# Patient Record
Sex: MALE | RURAL
[De-identification: names, ages, dates, MRNs, and addresses within clinical notes are randomized per-mention and may not be internally consistent; named-entity substitution may affect disease eponyms.]

---

## 2020-09-01 ENCOUNTER — HISTORICAL (OUTPATIENT)
Dept: ADMINISTRATIVE | Facility: HOSPITAL | Age: 80
End: 2020-09-01

## 2020-09-01 LAB
ANION GAP SERPL CALCULATED.3IONS-SCNC: 13 MMOL/L
BUN SERPL-MCNC: 22 MG/DL (ref 7–18)
CALCIUM SERPL-MCNC: 8.8 MG/DL (ref 8.5–10.1)
CHLORIDE SERPL-SCNC: 97 MMOL/L (ref 101–111)
CO2 SERPL-SCNC: 23 MMOL/L (ref 21–32)
CREAT SERPL-MCNC: 1.4 MG/DL (ref 0.6–1.3)
GLUCOSE SERPL-MCNC: 81 MG/DL (ref 74–106)
POTASSIUM SERPL-SCNC: 5.2 MMOL/L (ref 3.6–5)
SODIUM SERPL-SCNC: 128 MMOL/L (ref 135–145)

## 2023-01-25 ENCOUNTER — LAB REQUISITION (OUTPATIENT)
Dept: LAB | Facility: HOSPITAL | Age: 83
End: 2023-01-25
Attending: INTERNAL MEDICINE
Payer: MEDICARE

## 2023-01-25 DIAGNOSIS — Z79.82 LONG TERM (CURRENT) USE OF ASPIRIN: ICD-10-CM

## 2023-01-25 DIAGNOSIS — I10 ESSENTIAL (PRIMARY) HYPERTENSION: ICD-10-CM

## 2023-01-25 DIAGNOSIS — I73.9 PERIPHERAL VASCULAR DISEASE, UNSPECIFIED: ICD-10-CM

## 2023-01-25 DIAGNOSIS — E78.5 HYPERLIPIDEMIA, UNSPECIFIED: ICD-10-CM

## 2023-01-25 LAB
ALBUMIN SERPL BCP-MCNC: 2.8 G/DL (ref 3.5–5)
ALBUMIN/GLOB SERPL: 0.8 {RATIO}
ALP SERPL-CCNC: 120 U/L (ref 45–115)
ALT SERPL W P-5'-P-CCNC: 14 U/L (ref 16–61)
ANION GAP SERPL CALCULATED.3IONS-SCNC: 11 MMOL/L (ref 7–16)
AST SERPL W P-5'-P-CCNC: 15 U/L (ref 15–37)
BASOPHILS # BLD AUTO: 0 K/UL (ref 0–0.2)
BASOPHILS NFR BLD AUTO: 0 % (ref 0–1)
BILIRUB SERPL-MCNC: 0.4 MG/DL (ref ?–1.2)
BUN SERPL-MCNC: 19 MG/DL (ref 7–18)
BUN/CREAT SERPL: 12 (ref 6–20)
CALCIUM SERPL-MCNC: 9.4 MG/DL (ref 8.5–10.1)
CHLORIDE SERPL-SCNC: 106 MMOL/L (ref 98–107)
CHOLEST SERPL-MCNC: 124 MG/DL (ref 0–200)
CHOLEST/HDLC SERPL: 3 {RATIO}
CO2 SERPL-SCNC: 29 MMOL/L (ref 21–32)
CREAT SERPL-MCNC: 1.59 MG/DL (ref 0.7–1.3)
DIFFERENTIAL METHOD BLD: ABNORMAL
EGFR (NO RACE VARIABLE) (RUSH/TITUS): 44 ML/MIN/1.73M²
EOSINOPHIL # BLD AUTO: 0.15 K/UL (ref 0–0.5)
EOSINOPHIL NFR BLD AUTO: 1.6 % (ref 1–4)
ERYTHROCYTE [DISTWIDTH] IN BLOOD BY AUTOMATED COUNT: 13.4 % (ref 11.5–14.5)
GLOBULIN SER-MCNC: 3.4 G/DL (ref 2–4)
GLUCOSE SERPL-MCNC: 93 MG/DL (ref 74–106)
HCT VFR BLD AUTO: 41.2 % (ref 40–54)
HDLC SERPL-MCNC: 41 MG/DL (ref 40–60)
HGB BLD-MCNC: 13.4 G/DL (ref 13.5–18)
LDLC SERPL CALC-MCNC: 70 MG/DL
LDLC/HDLC SERPL: 1.7 {RATIO}
LYMPHOCYTES # BLD AUTO: 1.76 K/UL (ref 1–4.8)
LYMPHOCYTES NFR BLD AUTO: 18.3 % (ref 27–41)
MCH RBC QN AUTO: 28.2 PG (ref 27–31)
MCHC RBC AUTO-ENTMCNC: 32.5 G/DL (ref 32–36)
MCV RBC AUTO: 86.7 FL (ref 80–96)
MONOCYTES # BLD AUTO: 0.57 K/UL (ref 0–0.8)
MONOCYTES NFR BLD AUTO: 5.9 % (ref 2–6)
MPC BLD CALC-MCNC: 11.9 FL (ref 9.4–12.4)
NEUTROPHILS # BLD AUTO: 7.16 K/UL (ref 1.8–7.7)
NEUTROPHILS NFR BLD AUTO: 74.2 % (ref 53–65)
NONHDLC SERPL-MCNC: 83 MG/DL
NT-PROBNP SERPL-MCNC: 1557 PG/ML (ref 1–450)
PLATELET # BLD AUTO: 148 K/UL (ref 150–400)
POTASSIUM SERPL-SCNC: 4.1 MMOL/L (ref 3.5–5.1)
PROT SERPL-MCNC: 6.2 G/DL (ref 6.4–8.2)
RBC # BLD AUTO: 4.75 M/UL (ref 4.6–6.2)
SODIUM SERPL-SCNC: 142 MMOL/L (ref 136–145)
TRIGL SERPL-MCNC: 64 MG/DL (ref 35–150)
TSH SERPL DL<=0.005 MIU/L-ACNC: 0.77 UIU/ML (ref 0.36–3.74)
VLDLC SERPL-MCNC: 13 MG/DL
WBC # BLD AUTO: 9.64 K/UL (ref 4.5–11)

## 2023-01-25 PROCEDURE — 80053 COMPREHEN METABOLIC PANEL: CPT | Performed by: INTERNAL MEDICINE

## 2023-01-25 PROCEDURE — 83880 ASSAY OF NATRIURETIC PEPTIDE: CPT | Performed by: INTERNAL MEDICINE

## 2023-01-25 PROCEDURE — 80061 LIPID PANEL: CPT | Performed by: INTERNAL MEDICINE

## 2023-01-25 PROCEDURE — 85025 COMPLETE CBC W/AUTO DIFF WBC: CPT | Performed by: INTERNAL MEDICINE

## 2023-01-25 PROCEDURE — 84443 ASSAY THYROID STIM HORMONE: CPT | Performed by: INTERNAL MEDICINE

## 2024-04-30 ENCOUNTER — HOSPITAL ENCOUNTER (INPATIENT)
Facility: HOSPITAL | Age: 84
LOS: 3 days | Discharge: NURSING, REHAB OR LONG-TERM CARE | DRG: 641 | End: 2024-05-03
Attending: EMERGENCY MEDICINE | Admitting: HOSPITALIST
Payer: MEDICARE

## 2024-04-30 DIAGNOSIS — E86.0 DEHYDRATION: ICD-10-CM

## 2024-04-30 DIAGNOSIS — E87.0 DEHYDRATION WITH HYPERNATREMIA: ICD-10-CM

## 2024-04-30 DIAGNOSIS — N17.9 AKI (ACUTE KIDNEY INJURY): Primary | ICD-10-CM

## 2024-04-30 DIAGNOSIS — E86.0 DEHYDRATION WITH HYPERNATREMIA: ICD-10-CM

## 2024-04-30 DIAGNOSIS — R53.1 GENERAL WEAKNESS: ICD-10-CM

## 2024-04-30 DIAGNOSIS — N39.0 URINARY TRACT INFECTION WITHOUT HEMATURIA, SITE UNSPECIFIED: ICD-10-CM

## 2024-04-30 DIAGNOSIS — E87.0 HYPERNATREMIA: ICD-10-CM

## 2024-04-30 DIAGNOSIS — R07.9 CHEST PAIN: ICD-10-CM

## 2024-04-30 PROBLEM — I10 SYSTOLIC HYPERTENSION: Status: ACTIVE | Noted: 2024-04-30

## 2024-04-30 PROBLEM — F01.B0 MODERATE VASCULAR DEMENTIA WITHOUT BEHAVIORAL DISTURBANCE, PSYCHOTIC DISTURBANCE, MOOD DISTURBANCE, OR ANXIETY: Status: ACTIVE | Noted: 2024-04-30

## 2024-04-30 PROBLEM — R62.7 FAILURE TO THRIVE IN ADULT: Status: ACTIVE | Noted: 2024-04-30

## 2024-04-30 PROBLEM — G40.909 SEIZURE DISORDER: Status: ACTIVE | Noted: 2024-04-30

## 2024-04-30 PROBLEM — Z85.46 HISTORY OF PROSTATE CANCER: Status: ACTIVE | Noted: 2024-04-30

## 2024-04-30 PROBLEM — Z86.73 HISTORY OF MULTIPLE STROKES: Status: ACTIVE | Noted: 2024-04-30

## 2024-04-30 PROBLEM — R79.89 PRERENAL AZOTEMIA: Status: ACTIVE | Noted: 2024-04-30

## 2024-04-30 PROBLEM — K56.41 FECAL IMPACTION IN RECTUM: Status: ACTIVE | Noted: 2024-04-30

## 2024-04-30 LAB
ALBUMIN SERPL BCP-MCNC: 1.9 G/DL (ref 3.5–5)
ALBUMIN/GLOB SERPL: 0.4 {RATIO}
ALP SERPL-CCNC: 100 U/L (ref 45–115)
ALT SERPL W P-5'-P-CCNC: 88 U/L (ref 16–61)
ANION GAP SERPL CALCULATED.3IONS-SCNC: 13 MMOL/L (ref 7–16)
AST SERPL W P-5'-P-CCNC: 66 U/L (ref 15–37)
BACTERIA #/AREA URNS HPF: ABNORMAL /HPF
BASOPHILS # BLD AUTO: 0.02 K/UL (ref 0–0.2)
BASOPHILS NFR BLD AUTO: 0.1 % (ref 0–1)
BILIRUB SERPL-MCNC: 0.3 MG/DL (ref ?–1.2)
BILIRUB UR QL STRIP: NEGATIVE
BUN SERPL-MCNC: 67 MG/DL (ref 7–18)
BUN/CREAT SERPL: 25 (ref 6–20)
CALCIUM SERPL-MCNC: 8.6 MG/DL (ref 8.5–10.1)
CHLORIDE SERPL-SCNC: 118 MMOL/L (ref 98–107)
CLARITY UR: ABNORMAL
CO2 SERPL-SCNC: 27 MMOL/L (ref 21–32)
COLOR UR: YELLOW
CREAT SERPL-MCNC: 2.73 MG/DL (ref 0.7–1.3)
DIFFERENTIAL METHOD BLD: ABNORMAL
EGFR (NO RACE VARIABLE) (RUSH/TITUS): 23 ML/MIN/1.73M2
EOSINOPHIL # BLD AUTO: 0.09 K/UL (ref 0–0.5)
EOSINOPHIL NFR BLD AUTO: 0.5 % (ref 1–4)
ERYTHROCYTE [DISTWIDTH] IN BLOOD BY AUTOMATED COUNT: 14.8 % (ref 11.5–14.5)
FINE GRAN CASTS #/AREA URNS LPF: ABNORMAL /LPF
GLOBULIN SER-MCNC: 5.2 G/DL (ref 2–4)
GLUCOSE SERPL-MCNC: 95 MG/DL (ref 74–106)
GLUCOSE UR STRIP-MCNC: NORMAL MG/DL
HCT VFR BLD AUTO: 38.7 % (ref 40–54)
HGB BLD-MCNC: 11.4 G/DL (ref 13.5–18)
HYALINE CASTS #/AREA URNS LPF: ABNORMAL /LPF
IMM GRANULOCYTES # BLD AUTO: 0.16 K/UL (ref 0–0.04)
IMM GRANULOCYTES NFR BLD: 0.9 % (ref 0–0.4)
KETONES UR STRIP-SCNC: NEGATIVE MG/DL
LEUKOCYTE ESTERASE UR QL STRIP: NEGATIVE
LYMPHOCYTES # BLD AUTO: 1.91 K/UL (ref 1–4.8)
LYMPHOCYTES NFR BLD AUTO: 10.5 % (ref 27–41)
MCH RBC QN AUTO: 27.4 PG (ref 27–31)
MCHC RBC AUTO-ENTMCNC: 29.5 G/DL (ref 32–36)
MCV RBC AUTO: 93 FL (ref 80–96)
MONOCYTES # BLD AUTO: 0.96 K/UL (ref 0–0.8)
MONOCYTES NFR BLD AUTO: 5.3 % (ref 2–6)
MPC BLD CALC-MCNC: 12.6 FL (ref 9.4–12.4)
MUCOUS, UA: ABNORMAL /LPF
NEUTROPHILS # BLD AUTO: 15.03 K/UL (ref 1.8–7.7)
NEUTROPHILS NFR BLD AUTO: 82.7 % (ref 53–65)
NITRITE UR QL STRIP: NEGATIVE
NRBC # BLD AUTO: 0 X10E3/UL
NRBC, AUTO (.00): 0 %
NT-PROBNP SERPL-MCNC: 7424 PG/ML (ref 1–450)
PH UR STRIP: 5.5 PH UNITS
PLATELET # BLD AUTO: 206 K/UL (ref 150–400)
POTASSIUM SERPL-SCNC: 3.6 MMOL/L (ref 3.5–5.1)
PROT SERPL-MCNC: 7.1 G/DL (ref 6.4–8.2)
PROT UR QL STRIP: 200
RBC # BLD AUTO: 4.16 M/UL (ref 4.6–6.2)
RBC # UR STRIP: NEGATIVE /UL
RBC #/AREA URNS HPF: 2 /HPF
SODIUM SERPL-SCNC: 154 MMOL/L (ref 136–145)
SP GR UR STRIP: 1.02
UROBILINOGEN UR STRIP-ACNC: NORMAL MG/DL
WBC # BLD AUTO: 18.17 K/UL (ref 4.5–11)
WBC #/AREA URNS HPF: 10 /HPF

## 2024-04-30 PROCEDURE — 25000003 PHARM REV CODE 250: Performed by: EMERGENCY MEDICINE

## 2024-04-30 PROCEDURE — 99285 EMERGENCY DEPT VISIT HI MDM: CPT | Mod: 25

## 2024-04-30 PROCEDURE — 99223 1ST HOSP IP/OBS HIGH 75: CPT | Mod: AI,,, | Performed by: HOSPITALIST

## 2024-04-30 PROCEDURE — 85025 COMPLETE CBC W/AUTO DIFF WBC: CPT | Performed by: EMERGENCY MEDICINE

## 2024-04-30 PROCEDURE — 63600175 PHARM REV CODE 636 W HCPCS: Performed by: HOSPITALIST

## 2024-04-30 PROCEDURE — 81001 URINALYSIS AUTO W/SCOPE: CPT | Performed by: EMERGENCY MEDICINE

## 2024-04-30 PROCEDURE — 99285 EMERGENCY DEPT VISIT HI MDM: CPT | Mod: ,,, | Performed by: EMERGENCY MEDICINE

## 2024-04-30 PROCEDURE — 36415 COLL VENOUS BLD VENIPUNCTURE: CPT | Performed by: EMERGENCY MEDICINE

## 2024-04-30 PROCEDURE — 83880 ASSAY OF NATRIURETIC PEPTIDE: CPT | Performed by: EMERGENCY MEDICINE

## 2024-04-30 PROCEDURE — 11000001 HC ACUTE MED/SURG PRIVATE ROOM

## 2024-04-30 PROCEDURE — 63600175 PHARM REV CODE 636 W HCPCS: Performed by: EMERGENCY MEDICINE

## 2024-04-30 PROCEDURE — 25000003 PHARM REV CODE 250: Performed by: HOSPITALIST

## 2024-04-30 PROCEDURE — 80053 COMPREHEN METABOLIC PANEL: CPT | Performed by: EMERGENCY MEDICINE

## 2024-04-30 PROCEDURE — G0103 PSA SCREENING: HCPCS | Performed by: HOSPITALIST

## 2024-04-30 RX ORDER — LEVETIRACETAM 500 MG/1
500 TABLET ORAL 2 TIMES DAILY
Status: DISCONTINUED | OUTPATIENT
Start: 2024-04-30 | End: 2024-05-03 | Stop reason: HOSPADM

## 2024-04-30 RX ORDER — LORAZEPAM 2 MG/ML
1 INJECTION INTRAMUSCULAR
Status: DISCONTINUED | OUTPATIENT
Start: 2024-04-30 | End: 2024-05-03 | Stop reason: HOSPADM

## 2024-04-30 RX ORDER — PRAVASTATIN SODIUM 40 MG/1
80 TABLET ORAL NIGHTLY
Status: DISCONTINUED | OUTPATIENT
Start: 2024-04-30 | End: 2024-05-03 | Stop reason: HOSPADM

## 2024-04-30 RX ORDER — CLOPIDOGREL BISULFATE 75 MG/1
75 TABLET ORAL DAILY
Status: DISCONTINUED | OUTPATIENT
Start: 2024-05-01 | End: 2024-05-03 | Stop reason: HOSPADM

## 2024-04-30 RX ORDER — PRAVASTATIN SODIUM 80 MG/1
80 TABLET ORAL NIGHTLY
Status: ON HOLD | COMMUNITY
Start: 2024-04-07 | End: 2024-06-05 | Stop reason: HOSPADM

## 2024-04-30 RX ORDER — PROMETHAZINE HYDROCHLORIDE 25 MG/1
25 TABLET ORAL EVERY 6 HOURS PRN
Status: DISCONTINUED | OUTPATIENT
Start: 2024-04-30 | End: 2024-05-03 | Stop reason: HOSPADM

## 2024-04-30 RX ORDER — ACETAMINOPHEN 500 MG
1000 TABLET ORAL EVERY 6 HOURS PRN
COMMUNITY

## 2024-04-30 RX ORDER — HEPARIN SODIUM 5000 [USP'U]/ML
5000 INJECTION, SOLUTION INTRAVENOUS; SUBCUTANEOUS EVERY 8 HOURS
Status: DISCONTINUED | OUTPATIENT
Start: 2024-04-30 | End: 2024-05-03 | Stop reason: HOSPADM

## 2024-04-30 RX ORDER — ACETAMINOPHEN 325 MG/1
650 TABLET ORAL EVERY 4 HOURS PRN
Status: DISCONTINUED | OUTPATIENT
Start: 2024-04-30 | End: 2024-05-03 | Stop reason: HOSPADM

## 2024-04-30 RX ORDER — SODIUM CHLORIDE 0.9 % (FLUSH) 0.9 %
10 SYRINGE (ML) INJECTION EVERY 12 HOURS PRN
Status: DISCONTINUED | OUTPATIENT
Start: 2024-04-30 | End: 2024-05-03 | Stop reason: HOSPADM

## 2024-04-30 RX ORDER — PANTOPRAZOLE SODIUM 40 MG/1
40 FOR SUSPENSION ORAL DAILY
Status: DISCONTINUED | OUTPATIENT
Start: 2024-05-01 | End: 2024-05-03 | Stop reason: HOSPADM

## 2024-04-30 RX ORDER — DEXTROSE MONOHYDRATE AND SODIUM CHLORIDE 5; .225 G/100ML; G/100ML
100 INJECTION, SOLUTION INTRAVENOUS CONTINUOUS
Status: DISCONTINUED | OUTPATIENT
Start: 2024-04-30 | End: 2024-05-02

## 2024-04-30 RX ORDER — NALOXONE HCL 0.4 MG/ML
0.02 VIAL (ML) INJECTION
Status: DISCONTINUED | OUTPATIENT
Start: 2024-04-30 | End: 2024-05-03 | Stop reason: HOSPADM

## 2024-04-30 RX ORDER — LEVETIRACETAM 500 MG/1
500 TABLET ORAL 2 TIMES DAILY
COMMUNITY
Start: 2024-04-01

## 2024-04-30 RX ORDER — MIRTAZAPINE 15 MG/1
15 TABLET, FILM COATED ORAL NIGHTLY
Status: DISCONTINUED | OUTPATIENT
Start: 2024-04-30 | End: 2024-05-03 | Stop reason: HOSPADM

## 2024-04-30 RX ORDER — PANTOPRAZOLE SODIUM 40 MG/1
40 FOR SUSPENSION ORAL DAILY
COMMUNITY
Start: 2024-04-02

## 2024-04-30 RX ORDER — LATANOPROST 50 UG/ML
1 SOLUTION/ DROPS OPHTHALMIC NIGHTLY
Status: DISCONTINUED | OUTPATIENT
Start: 2024-04-30 | End: 2024-05-03 | Stop reason: HOSPADM

## 2024-04-30 RX ORDER — ONDANSETRON HYDROCHLORIDE 2 MG/ML
4 INJECTION, SOLUTION INTRAVENOUS EVERY 8 HOURS PRN
Status: DISCONTINUED | OUTPATIENT
Start: 2024-04-30 | End: 2024-05-03 | Stop reason: HOSPADM

## 2024-04-30 RX ORDER — LATANOPROST 50 UG/ML
1 SOLUTION/ DROPS OPHTHALMIC NIGHTLY
COMMUNITY
Start: 2024-03-01

## 2024-04-30 RX ORDER — ASPIRIN 81 MG/1
81 TABLET ORAL DAILY
Status: DISCONTINUED | OUTPATIENT
Start: 2024-05-01 | End: 2024-05-03 | Stop reason: HOSPADM

## 2024-04-30 RX ORDER — ASPIRIN 81 MG/1
1 TABLET ORAL DAILY
Status: ON HOLD | COMMUNITY
End: 2024-06-05 | Stop reason: HOSPADM

## 2024-04-30 RX ORDER — CLOPIDOGREL BISULFATE 75 MG/1
75 TABLET ORAL DAILY
COMMUNITY
Start: 2024-04-15

## 2024-04-30 RX ORDER — MIRTAZAPINE 15 MG/1
15 TABLET, FILM COATED ORAL NIGHTLY
COMMUNITY
Start: 2024-04-18

## 2024-04-30 RX ADMIN — LEVETIRACETAM 500 MG: 500 TABLET, FILM COATED ORAL at 09:04

## 2024-04-30 RX ADMIN — DEXTROSE AND SODIUM CHLORIDE 100 ML/HR: 5; 200 INJECTION, SOLUTION INTRAVENOUS at 06:04

## 2024-04-30 RX ADMIN — LATANOPROST 1 DROP: 50 SOLUTION OPHTHALMIC at 09:04

## 2024-04-30 RX ADMIN — MIRTAZAPINE 15 MG: 15 TABLET, FILM COATED ORAL at 09:04

## 2024-04-30 RX ADMIN — SODIUM CHLORIDE 500 ML: 9 INJECTION, SOLUTION INTRAVENOUS at 03:04

## 2024-04-30 RX ADMIN — CEFTRIAXONE SODIUM 1 G: 1 INJECTION, POWDER, FOR SOLUTION INTRAMUSCULAR; INTRAVENOUS at 03:04

## 2024-04-30 RX ADMIN — PRAVASTATIN SODIUM 80 MG: 40 TABLET ORAL at 09:04

## 2024-04-30 RX ADMIN — HEPARIN SODIUM 5000 UNITS: 5000 INJECTION, SOLUTION INTRAVENOUS; SUBCUTANEOUS at 09:04

## 2024-04-30 NOTE — ED NOTES
Called [facility] Medical Records and they stated they would fax over information right now.

## 2024-04-30 NOTE — PLAN OF CARE
Problem: Adult Inpatient Plan of Care  Goal: Plan of Care Review  Outcome: Progressing  Goal: Patient-Specific Goal (Individualized)  Outcome: Progressing  Goal: Absence of Hospital-Acquired Illness or Injury  Outcome: Progressing  Goal: Optimal Comfort and Wellbeing  Outcome: Progressing  Goal: Readiness for Transition of Care  Outcome: Progressing     Problem: Wound  Goal: Optimal Coping  Outcome: Progressing  Goal: Optimal Functional Ability  Outcome: Progressing  Goal: Absence of Infection Signs and Symptoms  Outcome: Progressing  Goal: Improved Oral Intake  Outcome: Progressing  Goal: Optimal Pain Control and Function  Outcome: Progressing  Goal: Skin Health and Integrity  Outcome: Progressing  Goal: Optimal Wound Healing  Outcome: Progressing     Problem: Skin Injury Risk Increased  Goal: Skin Health and Integrity  Outcome: Progressing

## 2024-04-30 NOTE — ED TRIAGE NOTES
"""Recommend cataract extraction with intraocular lens (IOL) OS. Discussed the RBAs mentioned the loss part/all of vision with surgery answered patient questions and offered patient copy of consent form. """ Presents to ED via EMS from [facility] in [place], [place] for c/o abnormal labs. Patient had elevated BUN, creatinine and sodium level that was checked 4/29/24. Patient was on Lasix and had labs done last week that were elevated and was taken off the Lasix. Labs were rechecked yesterday and worsened.

## 2024-04-30 NOTE — ED PROVIDER NOTES
Encounter Date: 4/30/2024       History     Chief Complaint   Patient presents with    Abnormal Lab     Patient was transferred by EMS to Hospital from [facility] for evaluation of metabolic panel done on April 29th at 5:06 p.m..  Creatinine resulted as 2.5.  Sodium was 157.  Potassium was 5.1.  BUN was 29.  Bicarb was 24.  Calcium 8.7.  Patient had a stroke in 2008.  He has had right-sided hemiplegia chronically.  There has been no acute change according to EMS.  Patient does participate in the history but he was aphasic and can answer yes or no.  He does answer affirmatively to does he feel generally weak but answers no in regard to pain or shortness of breath or vomiting or new weakness        Review of patient's allergies indicates:  No Known Allergies  Past Medical History:   Diagnosis Date    Depression     Hemiplegia and hemiparesis following cerebral infarction affecting right dominant side     Hypertension     Seizures     Stroke     Vascular dementia      No past surgical history on file.  No family history on file.  Social History     Tobacco Use    Smoking status: Unknown   Substance Use Topics    Alcohol use: Not Currently    Drug use: Not Currently     Review of Systems   Constitutional:  Negative for fever.   HENT:  Negative for sore throat.    Respiratory:  Negative for shortness of breath.    Cardiovascular:  Negative for chest pain.   Gastrointestinal:  Negative for nausea.   Genitourinary:  Negative for dysuria.   Musculoskeletal:  Negative for back pain.   Skin:  Negative for rash.   Neurological:  Positive for weakness.   Hematological:  Does not bruise/bleed easily.       Physical Exam     Initial Vitals   BP Pulse Resp Temp SpO2   04/30/24 1008 04/30/24 1009 04/30/24 1009 04/30/24 1009 04/30/24 1009   110/84 83 14 98.3 °F (36.8 °C) 100 %      MAP       --                Physical Exam    Nursing note and vitals reviewed.  Constitutional: He appears well-developed and well-nourished.   HENT:    Head: Normocephalic and atraumatic.   Eyes: EOM are normal. Pupils are equal, round, and reactive to light.   Neck: Neck supple. No thyromegaly present. No JVD present.   Normal range of motion.  Cardiovascular:  Normal rate, regular rhythm, normal heart sounds and intact distal pulses.           No murmur heard.  Pulmonary/Chest: Breath sounds normal. No stridor. No respiratory distress. He has no wheezes.   Abdominal: Abdomen is soft. Bowel sounds are normal. He exhibits no distension. There is no abdominal tenderness.   Musculoskeletal:         General: Edema (mild edema bilateral lower extremities) present. No tenderness. Normal range of motion.      Cervical back: Normal range of motion and neck supple.     Lymphadenopathy:     He has no cervical adenopathy.   Neurological: He is alert and oriented to person, place, and time. He has normal strength. No cranial nerve deficit or sensory deficit. GCS score is 15. GCS eye subscore is 4. GCS verbal subscore is 5. GCS motor subscore is 6.   Right-sided hemiplegia.  Aphasia.   Skin: Skin is warm and dry. Capillary refill takes less than 2 seconds. No rash noted.   Psychiatric: He has a normal mood and affect.         Medical Screening Exam   See Full Note    ED Course   Procedures  Labs Reviewed   COMPREHENSIVE METABOLIC PANEL - Abnormal; Notable for the following components:       Result Value    Sodium 154 (*)     Chloride 118 (*)     BUN 67 (*)     Creatinine 2.73 (*)     BUN/Creatinine Ratio 25 (*)     Albumin 1.9 (*)     Globulin 5.2 (*)     ALT 88 (*)     AST 66 (*)     eGFR 23 (*)     All other components within normal limits   URINALYSIS, REFLEX TO URINE CULTURE - Abnormal; Notable for the following components:    Protein,  (*)     All other components within normal limits   NT-PRO NATRIURETIC PEPTIDE - Abnormal; Notable for the following components:    ProBNP 7,424 (*)     All other components within normal limits   CBC WITH DIFFERENTIAL - Abnormal;  Notable for the following components:    WBC 18.17 (*)     RBC 4.16 (*)     Hemoglobin 11.4 (*)     Hematocrit 38.7 (*)     MCHC 29.5 (*)     RDW 14.8 (*)     MPV 12.6 (*)     Neutrophils % 82.7 (*)     Lymphocytes % 10.5 (*)     Eosinophils % 0.5 (*)     Immature Granulocytes % 0.9 (*)     Neutrophils, Abs 15.03 (*)     Monocytes, Absolute 0.96 (*)     Immature Granulocytes, Absolute 0.16 (*)     All other components within normal limits   URINALYSIS, MICROSCOPIC - Abnormal; Notable for the following components:    WBC, UA 10 (*)     Bacteria, UA Occasional (*)     Hyaline Casts, UA 2-5 (*)     Fine Granular Casts, UA 5-10 (*)     Mucous Occasional (*)     All other components within normal limits   CBC W/ AUTO DIFFERENTIAL    Narrative:     The following orders were created for panel order CBC auto differential.  Procedure                               Abnormality         Status                     ---------                               -----------         ------                     CBC with Differential[[id number]]       Abnormal            Final result                 Please view results for these tests on the individual orders.          Imaging Results              CT Abdomen Pelvis  Without Contrast (Final result)  Result time 04/30/24 14:15:28      Final result by Tico Dela Cruz MD (04/30/24 14:15:28)                   Impression:      Large degree of stool within the rectum.  Mild diffuse more proximal colonic stool.  No bowel obstruction.    Cholelithiasis.  No evidence to suggest cholecystitis by this study.      Electronically signed by: Tico Dela Cruz  Date:    04/30/2024  Time:    14:15               Narrative:    EXAMINATION:  CT ABDOMEN PELVIS WITHOUT CONTRAST    CLINICAL HISTORY:  Abdominal abscess/infection suspected;Abdominal pain, acute, nonlocalized;abdominal pain;    TECHNIQUE:  Low dose axial images, sagittal and coronal reformations were obtained from the lung bases to the pubic symphysis.  Oral  contrast was not administered. The CT examination was performed using one or more of the following dose reduction techniques: Automated exposure control, adjustment of the mA and kV according to patient's size, use of acute or iterative reconstruction techniques.    COMPARISON:  None    FINDINGS:  Lung bases are clear.  There is four-chamber mild cardiomegaly.  Coronary and thoracic aortic calcifications.    No focal hepatic lesion detected.  Cholelithiasis seen.  No inflammatory changes of the gallbladder to suggest cholecystitis by this CT.  There is a cyst seen inferiorly of the left kidney.  No hydronephrosis of either kidney.  Pancreas not well imaged.  Spleen unremarkable.    No pneumoperitoneum.  No ascites.  Mild diffuse colonic stool with note made that within the rectum there is a large degree of stool present.  No bowel obstruction.    No adenopathy.  Aortic and iliac calcifications.  Urinary bladder unremarkable.    Musculoskeletal: Diffuse degenerative changes of the spine and hips.  No acute fracture identified.                                       X-Ray Chest 1 View (Final result)  Result time 04/30/24 13:35:35      Final result by Tico Dela Cruz MD (04/30/24 13:35:35)                   Impression:      No acute findings.      Electronically signed by: Tico Dela Cruz  Date:    04/30/2024  Time:    13:35               Narrative:    EXAMINATION:  XR CHEST 1 VIEW    CLINICAL HISTORY:  Weakness    TECHNIQUE:  Single frontal view of the chest was performed.    COMPARISON:  None    FINDINGS:  Heart size normal. Lungs clear. No pneumothorax or pleural effusion.                                       Medications   aspirin EC tablet 81 mg (has no administration in time range)   clopidogreL tablet 75 mg (has no administration in time range)   latanoprost 0.005 % ophthalmic solution 1 drop (1 drop Both Eyes Given 4/30/24 2634)   levETIRAcetam tablet 500 mg (500 mg Oral Given 4/30/24 2100)   mirtazapine tablet 15 mg  (15 mg Oral Given 4/30/24 2147)   pantoprazole suspension 40 mg (has no administration in time range)   pravastatin tablet 80 mg (80 mg Oral Given 4/30/24 2147)   sodium chloride 0.9% flush 10 mL (has no administration in time range)   naloxone 0.4 mg/mL injection 0.02 mg (has no administration in time range)   acetaminophen tablet 650 mg (has no administration in time range)   ondansetron injection 4 mg (has no administration in time range)   promethazine tablet 25 mg (has no administration in time range)   heparin (porcine) injection 5,000 Units (5,000 Units Subcutaneous Given 4/30/24 2147)   LORazepam injection 1 mg (has no administration in time range)   dextrose 5 % and 0.2 % NaCl infusion (100 mL/hr Intravenous New Bag 5/1/24 6742)   cefTRIAXone (Rocephin) 1 g in dextrose 5 % in water (D5W) 100 mL IVPB (MB+) (0 g Intravenous Stopped 4/30/24 1621)   sodium chloride 0.9% bolus 500 mL 500 mL (0 mLs Intravenous Stopped 4/30/24 1651)     Medical Decision Making  MDM    Patient presents for emergent evaluation of acute generalized weakness decreased p.o. intake abnormal labs that poses a threat to life and/or bodily function.    In the ED patient found to have acute -duration acute kidney injury hyponatremia.  UTI  I ordered labs and personally reviewed them.  Labs significant for creatinine elevated.  Sodium high.  Pyuria  I ordered X-rays and personally reviewed them and reviewed the radiologist interpretation.  Xray significant for chest x-ray no acute abnormality.    I ordered CT scan and personally reviewed it and reviewed the radiologist interpretation.  CT significant for CT abdomen pelvis no acute abnormality.  Stool in rectum and colon.      Admission MDM  I discussed the patient presentation and findings with the consultant for hospitalist (speciality).    Patient was managed in the ED with IV Rocephin normal saline.    The response to treatment was improved.    Patient required emergent consultation to  Hospital Medicine (admitting physician) for admission.     Amount and/or Complexity of Data Reviewed  Labs: ordered.  Radiology: ordered.    Risk  Decision regarding hospitalization.               ED Course as of 05/01/24 0621 Tue Apr 30, 2024   1316 Review prior records from [facility] which just came in on the fax.  On April 29, 2024 his creatinine was 2.5.  On April 22nd his creatinine was 3.3 on April 16th his creatinine was 2.0.  However on October 25th 2023 his creatinine was 1.1.  Patient's white count on April 16th was 5.0.  Hemoglobin was 12.6 [PK]      ED Course User Index  [PK] Pb Fischer MD                           Clinical Impression:   Final diagnoses:  [R53.1] General weakness  [N17.9] DENYS (acute kidney injury) (Primary)  [E87.0] Hypernatremia  [E86.0] Dehydration  [N39.0] Urinary tract infection without hematuria, site unspecified        ED Disposition Condition    Admit Stable                Pb Fischer MD  05/01/24 0621

## 2024-04-30 NOTE — PHARMACY MED REC
"  Admission Medication History     The home medication history was taken by Madeline Batres.    You may go to "Admission" then "Reconcile Home Medications" tabs to review and/or act upon these items.     The home medication list has been updated by the Pharmacy department.   Please read ALL comments highlighted in yellow.   Please address this information as you see fit.    Feel free to contact us if you have any questions or require assistance.        Medications listed below were obtained from: Analytic software- [employer], Medical records, and Nursing home  No current facility-administered medications for this encounter.     Current Outpatient Medications   Medication Sig Dispense Refill    acetaminophen (TYLENOL) 500 MG tablet Take 1,000 mg by mouth every 6 (six) hours as needed for Pain or Temperature greater than.      aspirin (ECOTRIN) 81 MG EC tablet Take 1 tablet by mouth once daily.      clopidogreL (PLAVIX) 75 mg tablet Take 75 mg by mouth once daily.      latanoprost 0.005 % ophthalmic solution Place 1 drop into both eyes every evening.      levETIRAcetam (KEPPRA) 500 MG Tab Take 500 mg by mouth 2 (two) times daily.      mirtazapine (REMERON) 15 MG tablet Take 15 mg by mouth every evening.      pantoprazole (PROTONIX) 40 mg suspension Take 40 mg by mouth once daily.      pravastatin (PRAVACHOL) 80 MG tablet Take 80 mg by mouth every evening.           Current Outpatient Medications on File Prior to Encounter   Medication Sig Dispense Refill Last Dose    acetaminophen (TYLENOL) 500 MG tablet Take 1,000 mg by mouth every 6 (six) hours as needed for Pain or Temperature greater than.   Past Month    aspirin (ECOTRIN) 81 MG EC tablet Take 1 tablet by mouth once daily.   4/30/2024    clopidogreL (PLAVIX) 75 mg tablet Take 75 mg by mouth once daily.   4/30/2024    latanoprost 0.005 % ophthalmic solution Place 1 drop into both eyes every evening.   4/29/2024    levETIRAcetam (KEPPRA) 500 MG Tab Take " 500 mg by mouth 2 (two) times daily.   4/30/2024    mirtazapine (REMERON) 15 MG tablet Take 15 mg by mouth every evening.   4/29/2024    pantoprazole (PROTONIX) 40 mg suspension Take 40 mg by mouth once daily.   4/30/2024    pravastatin (PRAVACHOL) 80 MG tablet Take 80 mg by mouth every evening.   4/29/2024       Potential issues to be addressed PRIOR TO DISCHARGE  N/A    Madeline Batres  EXT [id number]               .

## 2024-04-30 NOTE — ED NOTES
Attempted to call [facility] Medical Records. No answer but left voicemail with callback number.

## 2024-04-30 NOTE — ED NOTES
Authorization For Release of Confidential Information sent to [facility]. Nurse De La Rosa received verbal consent via telephone from pt's spouse Margareth Ambriz.

## 2024-05-01 LAB
25(OH)D3 SERPL-MCNC: 16.6 NG/ML
AMMONIA PLAS-SCNC: 25 ΜMOL/L (ref 11–32)
ANION GAP SERPL CALCULATED.3IONS-SCNC: 11 MMOL/L (ref 7–16)
BASOPHILS # BLD AUTO: 0.03 K/UL (ref 0–0.2)
BASOPHILS NFR BLD AUTO: 0.2 % (ref 0–1)
BUN SERPL-MCNC: 55 MG/DL (ref 7–18)
BUN/CREAT SERPL: 23 (ref 6–20)
CALCIUM SERPL-MCNC: 9 MG/DL (ref 8.5–10.1)
CHLORIDE SERPL-SCNC: 121 MMOL/L (ref 98–107)
CO2 SERPL-SCNC: 24 MMOL/L (ref 21–32)
CREAT SERPL-MCNC: 2.36 MG/DL (ref 0.7–1.3)
CRP SERPL-MCNC: 10.7 MG/DL (ref 0–0.8)
DIFFERENTIAL METHOD BLD: ABNORMAL
EGFR (NO RACE VARIABLE) (RUSH/TITUS): 27 ML/MIN/1.73M2
EOSINOPHIL # BLD AUTO: 0.22 K/UL (ref 0–0.5)
EOSINOPHIL NFR BLD AUTO: 1.7 % (ref 1–4)
ERYTHROCYTE [DISTWIDTH] IN BLOOD BY AUTOMATED COUNT: 14.7 % (ref 11.5–14.5)
FOLATE SERPL-MCNC: 12.3 NG/ML (ref 3.1–17.5)
GLUCOSE SERPL-MCNC: 104 MG/DL (ref 74–106)
HAV IGM SER QL: NORMAL
HBV CORE IGM SER QL: NORMAL
HBV SURFACE AG SERPL QL IA: NORMAL
HCT VFR BLD AUTO: 42 % (ref 40–54)
HCV AB SER QL: NORMAL
HGB BLD-MCNC: 12.3 G/DL (ref 13.5–18)
HIV 1+O+2 AB SERPL QL: NORMAL
IMM GRANULOCYTES # BLD AUTO: 0.06 K/UL (ref 0–0.04)
IMM GRANULOCYTES NFR BLD: 0.5 % (ref 0–0.4)
LYMPHOCYTES # BLD AUTO: 1.53 K/UL (ref 1–4.8)
LYMPHOCYTES NFR BLD AUTO: 12 % (ref 27–41)
MCH RBC QN AUTO: 27.1 PG (ref 27–31)
MCHC RBC AUTO-ENTMCNC: 29.3 G/DL (ref 32–36)
MCV RBC AUTO: 92.5 FL (ref 80–96)
MONOCYTES # BLD AUTO: 0.53 K/UL (ref 0–0.8)
MONOCYTES NFR BLD AUTO: 4.2 % (ref 2–6)
MPC BLD CALC-MCNC: 12.5 FL (ref 9.4–12.4)
NEUTROPHILS # BLD AUTO: 10.35 K/UL (ref 1.8–7.7)
NEUTROPHILS NFR BLD AUTO: 81.4 % (ref 53–65)
NRBC # BLD AUTO: 0 X10E3/UL
NRBC, AUTO (.00): 0 %
PLATELET # BLD AUTO: 208 K/UL (ref 150–400)
POTASSIUM SERPL-SCNC: 4.1 MMOL/L (ref 3.5–5.1)
PREALB SERPL NEPH-MCNC: 14 MG/DL (ref 20–40)
PSA SERPL-MCNC: 0.02 NG/ML
RBC # BLD AUTO: 4.54 M/UL (ref 4.6–6.2)
SODIUM SERPL-SCNC: 152 MMOL/L (ref 136–145)
SYPHILIS AB INTERPRETATION: NORMAL
T4 SERPL-MCNC: 5 ΜG/DL (ref 4.5–12.1)
TSH SERPL DL<=0.005 MIU/L-ACNC: 2.55 UIU/ML (ref 0.36–3.74)
URATE SERPL-MCNC: 8.2 MG/DL (ref 3.5–7.2)
VIT B12 SERPL-MCNC: 608 PG/ML (ref 193–986)
WBC # BLD AUTO: 12.72 K/UL (ref 4.5–11)

## 2024-05-01 PROCEDURE — 84134 ASSAY OF PREALBUMIN: CPT | Performed by: HOSPITALIST

## 2024-05-01 PROCEDURE — 92610 EVALUATE SWALLOWING FUNCTION: CPT

## 2024-05-01 PROCEDURE — 82746 ASSAY OF FOLIC ACID SERUM: CPT | Performed by: HOSPITALIST

## 2024-05-01 PROCEDURE — 84550 ASSAY OF BLOOD/URIC ACID: CPT | Performed by: HOSPITALIST

## 2024-05-01 PROCEDURE — 85025 COMPLETE CBC W/AUTO DIFF WBC: CPT | Performed by: HOSPITALIST

## 2024-05-01 PROCEDURE — 86140 C-REACTIVE PROTEIN: CPT | Performed by: HOSPITALIST

## 2024-05-01 PROCEDURE — 86780 TREPONEMA PALLIDUM: CPT | Performed by: HOSPITALIST

## 2024-05-01 PROCEDURE — 63600175 PHARM REV CODE 636 W HCPCS: Performed by: HOSPITALIST

## 2024-05-01 PROCEDURE — 25000003 PHARM REV CODE 250: Performed by: HOSPITALIST

## 2024-05-01 PROCEDURE — 36415 COLL VENOUS BLD VENIPUNCTURE: CPT | Performed by: HOSPITALIST

## 2024-05-01 PROCEDURE — 87389 HIV-1 AG W/HIV-1&-2 AB AG IA: CPT | Performed by: HOSPITALIST

## 2024-05-01 PROCEDURE — 84436 ASSAY OF TOTAL THYROXINE: CPT | Performed by: HOSPITALIST

## 2024-05-01 PROCEDURE — 11000001 HC ACUTE MED/SURG PRIVATE ROOM

## 2024-05-01 PROCEDURE — 80048 BASIC METABOLIC PNL TOTAL CA: CPT | Performed by: HOSPITALIST

## 2024-05-01 PROCEDURE — 80074 ACUTE HEPATITIS PANEL: CPT | Performed by: HOSPITALIST

## 2024-05-01 PROCEDURE — 82140 ASSAY OF AMMONIA: CPT | Performed by: HOSPITALIST

## 2024-05-01 PROCEDURE — 82306 VITAMIN D 25 HYDROXY: CPT | Performed by: HOSPITALIST

## 2024-05-01 PROCEDURE — 99233 SBSQ HOSP IP/OBS HIGH 50: CPT | Mod: ,,, | Performed by: HOSPITALIST

## 2024-05-01 PROCEDURE — 84443 ASSAY THYROID STIM HORMONE: CPT | Performed by: HOSPITALIST

## 2024-05-01 RX ADMIN — HEPARIN SODIUM 5000 UNITS: 5000 INJECTION, SOLUTION INTRAVENOUS; SUBCUTANEOUS at 02:05

## 2024-05-01 RX ADMIN — HEPARIN SODIUM 5000 UNITS: 5000 INJECTION, SOLUTION INTRAVENOUS; SUBCUTANEOUS at 06:05

## 2024-05-01 RX ADMIN — LEVETIRACETAM 500 MG: 500 TABLET, FILM COATED ORAL at 08:05

## 2024-05-01 RX ADMIN — DEXTROSE AND SODIUM CHLORIDE 100 ML/HR: 5; 200 INJECTION, SOLUTION INTRAVENOUS at 05:05

## 2024-05-01 RX ADMIN — DEXTROSE AND SODIUM CHLORIDE 100 ML/HR: 5; 200 INJECTION, SOLUTION INTRAVENOUS at 04:05

## 2024-05-01 RX ADMIN — ASPIRIN 81 MG: 81 TABLET, COATED ORAL at 08:05

## 2024-05-01 RX ADMIN — PANTOPRAZOLE SODIUM 40 MG: 40 GRANULE, DELAYED RELEASE ORAL at 08:05

## 2024-05-01 RX ADMIN — HEPARIN SODIUM 5000 UNITS: 5000 INJECTION, SOLUTION INTRAVENOUS; SUBCUTANEOUS at 09:05

## 2024-05-01 RX ADMIN — MIRTAZAPINE 15 MG: 15 TABLET, FILM COATED ORAL at 09:05

## 2024-05-01 RX ADMIN — PRAVASTATIN SODIUM 80 MG: 40 TABLET ORAL at 09:05

## 2024-05-01 RX ADMIN — LEVETIRACETAM 500 MG: 500 TABLET, FILM COATED ORAL at 09:05

## 2024-05-01 RX ADMIN — CLOPIDOGREL BISULFATE 75 MG: 75 TABLET ORAL at 08:05

## 2024-05-01 RX ADMIN — LATANOPROST 1 DROP: 50 SOLUTION OPHTHALMIC at 09:05

## 2024-05-01 NOTE — PT/OT/SLP EVAL
Speech Language Pathology Evaluation  Bedside Swallow    Patient Name:  Claude Patrick   MRN:  [id number]  Admitting Diagnosis: <principal problem not specified>    Recommendations:                 General Recommendations:  Follow-up not indicated  Diet recommendations:  Puree, Nectar Thick   Aspiration Precautions: 1 bite/sip at a time, Alternating bites/sips, Assistance with meals and Assistance with thickening liquids, Feed only when awake/alert, Frequent oral care, HOB to 90 degrees, Meds crushed in puree, Remain upright 30 minutes post meal, Small bites/sips, and Standard aspiration precautions   General Precautions: Standard,    Communication strategies:   Patient able to answer some simple questions and follow some simple 1 part commands.     Assessment:     Claude Patrick is a 83 y.o. male with an SLP diagnosis of Dysphagia.  He presents with coughing with thin liquids. Family does not want a PEG tube (per chart).    History:     Past Medical History:   Diagnosis Date    Depression     Hemiplegia and hemiparesis following cerebral infarction affecting right dominant side     Hypertension     Seizures     Stroke     Vascular dementia        No past surgical history on file.    Social History: Patient lives in a nursing home.    Prior Intubation HX:  n/a    Modified Barium Swallow: n/a    Chest X-Rays: see chart    Prior diet: unknown.    Occupation/hobbies/homemaking: not stated.    Subjective     Patient lying in bed awake. Patient agreeable to BEDSIDE SWALLOW EVALUATION.   Patient goals: not stated     Pain/Comfort:  Pain Rating 1: 0/10    Respiratory Status: Room air    Objective:     Oral Musculature Evaluation  Oral Musculature: general weakness  Secretion Management: adequate  Mucosal Quality: dry  Oral Labial Strength and Mobility: WFL  Lingual Strength and Mobility: WFL    Bedside Swallow Eval:   Consistencies Assessed:  Thin liquids Patient was given small trials of water via a spoon. Patient took 3-4  small trials and then began to cough. No further trials of thin liquid were given.   Nectar thick liquids Patient was given small trials of nectar thickened water via a spoon and a straw. No difficulties noted with any trials given. No overt s/s of aspiration noted.   Puree Patient was given small bites of pudding via a spoon. No difficulties noted. No overt s/s of aspiration noted with any trials given.       Oral Phase:   WFL    Pharyngeal Phase:   Coughing after trials of thin liquids    Compensatory Strategies  None    Treatment: Rec a pureed consistency diet with nectar thickened liquids as tolerated.     Goals:   Multidisciplinary Problems       SLP Goals       Not on file                    Plan:     Patient to be seen:      Plan of Care expires:     Plan of Care reviewed with:      SLP Follow-Up:  No       Discharge recommendations:      Barriers to Discharge:  Level of Skilled Assistance Needed return to nursing home    Time Tracking:     SLP Treatment Date:      Speech Start Time:  1000  Speech Stop Time:  1020     Speech Total Time (min):  20 min    Billable Minutes: Eval Swallow and Oral Function 20 05/01/2024

## 2024-05-01 NOTE — ASSESSMENT & PLAN NOTE
Chronic, controlled. Latest blood pressure and vitals reviewed-     Temp:  [97.4 °F (36.3 °C)-98.3 °F (36.8 °C)]   Pulse:  [63-84]   Resp:  [14-16]   BP: ()/()   SpO2:  [95 %-100 %] .   Home meds for hypertension were reviewed and noted below.       While in the hospital, will manage blood pressure as follows; Adjust home antihypertensive regimen as follows- cautiously control blood pressure with history of recent hypotension to hypertensive medicines    Will utilize p.r.n. blood pressure medication only if patient's blood pressure greater than 180/110 and he develops symptoms such as worsening chest pain or shortness of breath.

## 2024-05-01 NOTE — PLAN OF CARE
SS attempted initial assessment with pt, pt in altered state. SS attempted to contact pt's spouse, Margareth, and left voicemail. SS following.

## 2024-05-01 NOTE — ASSESSMENT & PLAN NOTE
Encourage oral intake  Treat constipation and remove fecal impaction  Family plans to honor patient wishes not to have PEG tube  Told wife to encourage oral intake throughout the day

## 2024-05-01 NOTE — H&P
[facility] - [address] [place] [address] Medicine  History & Physical    Patient Name: Claude Patrick  MRN: [id number]  Patient Class: IP- Inpatient  Admission Date: 4/30/2024  Attending Physician: Berhane Monsalve MD   Primary Care Provider: Tita, Primary Doctor         Patient information was obtained from patient, spouse/SO, past medical records, and ER records.     Subjective:     Principal Problem:<principal problem not specified>    Chief Complaint:   Chief Complaint   Patient presents with    Abnormal Lab        HPI: Chief complaint:  Progressive weakness    History of present illness:    Mr. Ambriz is a 83-year-old sent from [facility] in [place] with history of progressive weakness decreased appetite.  Had recent laboratory studies drawn showing evidence of dehydration with hypernatremia.  Patient unable to give history but discussed with wife by phone.  Patient started having strokes in 2008.  Has had progressive inability to care for himself.  Has had expressive aphasia and right-sided weakness.  Have not walked in a proximally 4 years.  Hospitalist service asked to see patient for evaluation admission.    Review of systems:  See above.  Patient has not been able to talk for awhile but according to wife has intermittently been able to be awake and seemed to communicate.  Wife states sometimes he gets sleepy.  Has had decreased appetite but no I have his choking episodes.  Has progressively lost weight.  Issues in past with constipation but wife's not aware of him having any previous impactions.  Wife states he is gotten up in the nursing home in a wheelchair almost daily.  Review of system otherwise negative.    Past medical history:  -hypertension treated in the past but has had episode of hypotension and taken off medication  -prostate cancer with prostatectomy in 2004  -seizure disorder initially seizures were manifest by patient smelling a bad smell.  Seizures had progressed  and had more recently tonic-clonic seizure.  Wife states he has had many seizures in the past but she has not aware of any in the last 2 years.  -CVA diagnosed in 2008 with several seizures since then with progressive weakness now with expressive aphasia and dense right-sided paresthesia    Past surgical history:  Prostatectomy in 2004 as above mentioned    No known allergies    Social history:  Patient has been a resident [facility] since October 2023 and prior to that he was at home  No history tobacco alcohol use  Wife states family has made patient previously DNR  Wife states patient had previously voiced that he wants no permanent feeding tube    Family history:  Father side with diabetes and heart disease  Mother side with hypertension    Health maintenance issues  Resident of [facility] in [place]  Had flu shot last fall  Had prior Pneumovax  No known prior COVID-19 infection  COVID vaccination x2  Wife's not aware of patient ever having any previous colonoscopy        Past Medical History:   Diagnosis Date    Depression     Hemiplegia and hemiparesis following cerebral infarction affecting right dominant side     Hypertension     Seizures     Stroke     Vascular dementia        No past surgical history on file.    Review of patient's allergies indicates:  No Known Allergies    Current Facility-Administered Medications   Medication Dose Route Frequency Provider Last Rate Last Admin    acetaminophen tablet 650 mg  650 mg Oral Q4H PRN Berhane Monsalve MD        [START ON 5/1/2024] aspirin EC tablet 81 mg  81 mg Oral Daily Berhane Monsalve MD        [START ON 5/1/2024] clopidogreL tablet 75 mg  75 mg Oral Daily Berhane Monsalve MD        dextrose 5 % and 0.2 % NaCl infusion  100 mL/hr Intravenous Continuous Berhane Monsalve  mL/hr at 04/30/24 1826 100 mL/hr at 04/30/24 1826    heparin (porcine) injection 5,000 Units  5,000 Units Subcutaneous Q8H Berhane Monsalve MD        latanoprost 0.005 %  ophthalmic solution 1 drop  1 drop Both Eyes QHS Berhane Monsalve MD        levETIRAcetam tablet 500 mg  500 mg Oral BID Berhane Monsalve MD        LORazepam injection 1 mg  1 mg Intravenous Q2H PRN Berhane Monsalve MD        mirtazapine tablet 15 mg  15 mg Oral QHS Berhane Monsalve MD        naloxone 0.4 mg/mL injection 0.02 mg  0.02 mg Intravenous PRN Berhane Monsalve MD        ondansetron injection 4 mg  4 mg Intravenous Q8H PRN Berhane Monsalve MD        [START ON 5/1/2024] pantoprazole suspension 40 mg  40 mg Oral Daily Berhane Monsalve MD        pravastatin tablet 80 mg  80 mg Oral QHS Berhane Monsalve MD        promethazine tablet 25 mg  25 mg Oral Q6H PRN Berhane Monsalve MD        sodium chloride 0.9% flush 10 mL  10 mL Intravenous Q12H PRN Berhane Monsalve MD         Family History    None       Tobacco Use    Smoking status: Unknown    Smokeless tobacco: Not on file   Substance and Sexual Activity    Alcohol use: Not Currently    Drug use: Not Currently    Sexual activity: Not on file     Review of Systems   Constitutional:  Positive for appetite change, fatigue and unexpected weight change. Negative for fever.   HENT:  Negative for congestion, hearing loss and trouble swallowing.    Respiratory:  Negative for chest tightness, shortness of breath and wheezing.    Cardiovascular:  Negative for chest pain and palpitations.   Gastrointestinal:  Negative for abdominal pain, constipation and nausea.   Genitourinary:  Negative for difficulty urinating and dysuria.   Musculoskeletal:  Positive for gait problem. Negative for back pain and neck stiffness.   Skin:  Negative for pallor and rash.   Neurological:  Positive for speech difficulty. Negative for dizziness and headaches.   Psychiatric/Behavioral:  Negative for confusion and suicidal ideas.      Objective:     Vital Signs (Most Recent):  Temp: 97.4 °F (36.3 °C) (04/30/24 1945)  Pulse: 76 (04/30/24 1945)  Resp: 16 (04/30/24 1945)  BP: (!) 146/103 (nurse notified)  (04/30/24 1945)  SpO2: 99 % (04/30/24 1945) Vital Signs (24h Range):  Temp:  [97.4 °F (36.3 °C)-98.3 °F (36.8 °C)] 97.4 °F (36.3 °C)  Pulse:  [63-84] 76  Resp:  [14-16] 16  SpO2:  [95 %-100 %] 99 %  BP: ()/() 146/103     Weight: 63.8 kg (140 lb 10.5 oz)  Body mass index is 19.08 kg/m².     Physical Exam  Vitals reviewed.   Constitutional:       General: He is awake. He is not in acute distress.     Appearance: He is well-developed and underweight. He is not toxic-appearing.   HENT:      Head: Normocephalic.      Nose: Nose normal.      Mouth/Throat:      Pharynx: Oropharynx is clear.   Eyes:      Extraocular Movements: Extraocular movements intact.      Pupils: Pupils are equal, round, and reactive to light.   Neck:      Thyroid: No thyroid mass.      Vascular: No carotid bruit.   Cardiovascular:      Rate and Rhythm: Normal rate and regular rhythm.      Pulses: Normal pulses.      Heart sounds: Normal heart sounds. No murmur heard.  Pulmonary:      Effort: Pulmonary effort is normal.      Breath sounds: Normal breath sounds and air entry. No wheezing.   Abdominal:      General: Bowel sounds are normal. There is no distension.      Palpations: Abdomen is soft.      Tenderness: There is no abdominal tenderness.   Musculoskeletal:      Cervical back: Neck supple. No rigidity.      Comments:   Contractions to right side particularly right leg partially flexed at needs   Skin:     General: Skin is warm.      Coloration: Skin is not jaundiced.      Findings: No lesion.   Neurological:      General: No focal deficit present.      Mental Status: He is alert and oriented to person, place, and time.      Cranial Nerves: Dysarthria present. No cranial nerve deficit.      Comments:   Expressive aphasia    Dense right-sided hemiparesis with good strength on the left  Patient is able to move his left side to commands     Psychiatric:         Attention and Perception: Attention normal.         Mood and Affect: Mood  normal.         Behavior: Behavior normal. Behavior is cooperative.         Thought Content: Thought content normal.         Cognition and Memory: Cognition normal.              CRANIAL NERVES     CN III, IV, VI   Pupils are equal, round, and reactive to light.       Significant Labs: All pertinent labs within the past 24 hours have been reviewed.  BMP:   Recent Labs   Lab 04/30/24  1015   GLU 95   *   K 3.6   *   CO2 27   BUN 67*   CREATININE 2.73*   CALCIUM 8.6     CBC:   Recent Labs   Lab 04/30/24  1015   WBC 18.17*   HGB 11.4*   HCT 38.7*        CMP:   Recent Labs   Lab 04/30/24  1015   *   K 3.6   *   CO2 27   GLU 95   BUN 67*   CREATININE 2.73*   CALCIUM 8.6   PROT 7.1   ALBUMIN 1.9*   BILITOT 0.3   ALKPHOS 100   AST 66*   ALT 88*   ANIONGAP 13       Significant Imaging: I have reviewed all pertinent imaging results/findings within the past 24 hours.    Imaging Results              CT Abdomen Pelvis  Without Contrast (Final result)  Result time 04/30/24 14:15:28      Final result by Tico Dela Cruz MD (04/30/24 14:15:28)                   Impression:      Large degree of stool within the rectum.  Mild diffuse more proximal colonic stool.  No bowel obstruction.    Cholelithiasis.  No evidence to suggest cholecystitis by this study.      Electronically signed by: Tico Dela Cruz  Date:    04/30/2024  Time:    14:15               Narrative:    EXAMINATION:  CT ABDOMEN PELVIS WITHOUT CONTRAST    CLINICAL HISTORY:  Abdominal abscess/infection suspected;Abdominal pain, acute, nonlocalized;abdominal pain;    TECHNIQUE:  Low dose axial images, sagittal and coronal reformations were obtained from the lung bases to the pubic symphysis.  Oral contrast was not administered. The CT examination was performed using one or more of the following dose reduction techniques: Automated exposure control, adjustment of the mA and kV according to patient's size, use of acute or iterative reconstruction  techniques.    COMPARISON:  None    FINDINGS:  Lung bases are clear.  There is four-chamber mild cardiomegaly.  Coronary and thoracic aortic calcifications.    No focal hepatic lesion detected.  Cholelithiasis seen.  No inflammatory changes of the gallbladder to suggest cholecystitis by this CT.  There is a cyst seen inferiorly of the left kidney.  No hydronephrosis of either kidney.  Pancreas not well imaged.  Spleen unremarkable.    No pneumoperitoneum.  No ascites.  Mild diffuse colonic stool with note made that within the rectum there is a large degree of stool present.  No bowel obstruction.    No adenopathy.  Aortic and iliac calcifications.  Urinary bladder unremarkable.    Musculoskeletal: Diffuse degenerative changes of the spine and hips.  No acute fracture identified.                                       X-Ray Chest 1 View (Final result)  Result time 04/30/24 13:35:35      Final result by Tico Dela Cruz MD (04/30/24 13:35:35)                   Impression:      No acute findings.      Electronically signed by: Tico Dela Cruz  Date:    04/30/2024  Time:    13:35               Narrative:    EXAMINATION:  XR CHEST 1 VIEW    CLINICAL HISTORY:  Weakness    TECHNIQUE:  Single frontal view of the chest was performed.    COMPARISON:  None    FINDINGS:  Heart size normal. Lungs clear. No pneumothorax or pleural effusion.                                    Intake/Output - Last 3 Shifts       None          Microbiology Results (last 7 days)       ** No results found for the last 168 hours. **            Assessment/Plan:     History of prostate cancer    Treated with prostatectomy in 2004  Rechecked PSA    Prerenal azotemia    Hydrate    Seizure disorder  Continue Keppra    History of multiple strokes    Supportive care    Systolic hypertension  Chronic, controlled. Latest blood pressure and vitals reviewed-     Temp:  [97.4 °F (36.3 °C)-98.3 °F (36.8 °C)]   Pulse:  [63-84]   Resp:  [14-16]   BP: ()/()   SpO2:   [95 %-100 %] .   Home meds for hypertension were reviewed and noted below.       While in the hospital, will manage blood pressure as follows; Adjust home antihypertensive regimen as follows- cautiously control blood pressure with history of recent hypotension to hypertensive medicines    Will utilize p.r.n. blood pressure medication only if patient's blood pressure greater than 180/110 and he develops symptoms such as worsening chest pain or shortness of breath.    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 Dementia is moderate. The patient does not have signs of behavioral disturbance. Dementia medications are given. Continue non-pharmacologic interventions to prevent delirium (No VS between 11PM-5AM, activity during day, opening blinds, providing glasses/hearing aids, and up in chair during daytime). Will avoid narcotics and benzos unless absolutely necessary. PRN anti-psychotics are not prescribed to avoid self harm behaviors.    Failure to thrive in adult    Encourage oral intake  Treat constipation and remove fecal impaction  Family plans to honor patient wishes not to have PEG tube  Told wife to encourage oral intake throughout the day    Fecal impaction in rectum    Treat constipation room remove fecal impaction  Maybe this will help increase appetite    Dehydration with hypernatremia    Hydrate and monitor electrolytes      VTE Risk Mitigation (From admission, onward)           Ordered     heparin (porcine) injection 5,000 Units  Every 8 hours         04/30/24 1757     IP VTE HIGH RISK PATIENT  Once         04/30/24 1757     Place sequential compression device  Until discontinued         04/30/24 1757                                    Berhane Monsalve MD  Department of Hospital Medicine  [facility] - [facility] Tele[facility]

## 2024-05-01 NOTE — SUBJECTIVE & OBJECTIVE
Past Medical History:   Diagnosis Date    Depression     Hemiplegia and hemiparesis following cerebral infarction affecting right dominant side     Hypertension     Seizures     Stroke     Vascular dementia        No past surgical history on file.    Review of patient's allergies indicates:  No Known Allergies    Current Facility-Administered Medications   Medication Dose Route Frequency Provider Last Rate Last Admin    acetaminophen tablet 650 mg  650 mg Oral Q4H PRN Berhane Monsalve MD        [START ON 5/1/2024] aspirin EC tablet 81 mg  81 mg Oral Daily Berhane Monsalve MD        [START ON 5/1/2024] clopidogreL tablet 75 mg  75 mg Oral Daily Berhane Monsalve MD        dextrose 5 % and 0.2 % NaCl infusion  100 mL/hr Intravenous Continuous Berhane Monsalve  mL/hr at 04/30/24 1826 100 mL/hr at 04/30/24 1826    heparin (porcine) injection 5,000 Units  5,000 Units Subcutaneous Q8H Berhane Monsalve MD        latanoprost 0.005 % ophthalmic solution 1 drop  1 drop Both Eyes QHS Berhane Monsalve MD        levETIRAcetam tablet 500 mg  500 mg Oral BID Berhane Monsalve MD        LORazepam injection 1 mg  1 mg Intravenous Q2H PRN Berhane Monsalve MD        mirtazapine tablet 15 mg  15 mg Oral QHS Berhane Monsalve MD        naloxone 0.4 mg/mL injection 0.02 mg  0.02 mg Intravenous PRN Berhane Monsalve MD        ondansetron injection 4 mg  4 mg Intravenous Q8H PRN Berhane Monsalve MD        [START ON 5/1/2024] pantoprazole suspension 40 mg  40 mg Oral Daily Berhane Monsalve MD        pravastatin tablet 80 mg  80 mg Oral QHS Berhane Monsalve MD        promethazine tablet 25 mg  25 mg Oral Q6H PRN Berhane Monsalve MD        sodium chloride 0.9% flush 10 mL  10 mL Intravenous Q12H PRN Berhane Monsalve MD         Family History    None       Tobacco Use    Smoking status: Unknown    Smokeless tobacco: Not on file   Substance and Sexual Activity    Alcohol use: Not Currently    Drug use: Not Currently    Sexual activity: Not on file      Review of Systems   Constitutional:  Positive for appetite change, fatigue and unexpected weight change. Negative for fever.   HENT:  Negative for congestion, hearing loss and trouble swallowing.    Respiratory:  Negative for chest tightness, shortness of breath and wheezing.    Cardiovascular:  Negative for chest pain and palpitations.   Gastrointestinal:  Negative for abdominal pain, constipation and nausea.   Genitourinary:  Negative for difficulty urinating and dysuria.   Musculoskeletal:  Positive for gait problem. Negative for back pain and neck stiffness.   Skin:  Negative for pallor and rash.   Neurological:  Positive for speech difficulty. Negative for dizziness and headaches.   Psychiatric/Behavioral:  Negative for confusion and suicidal ideas.      Objective:     Vital Signs (Most Recent):  Temp: 97.4 °F (36.3 °C) (04/30/24 1945)  Pulse: 76 (04/30/24 1945)  Resp: 16 (04/30/24 1945)  BP: (!) 146/103 (nurse notified) (04/30/24 1945)  SpO2: 99 % (04/30/24 1945) Vital Signs (24h Range):  Temp:  [97.4 °F (36.3 °C)-98.3 °F (36.8 °C)] 97.4 °F (36.3 °C)  Pulse:  [63-84] 76  Resp:  [14-16] 16  SpO2:  [95 %-100 %] 99 %  BP: ()/() 146/103     Weight: 63.8 kg (140 lb 10.5 oz)  Body mass index is 19.08 kg/m².     Physical Exam  Vitals reviewed.   Constitutional:       General: He is awake. He is not in acute distress.     Appearance: He is well-developed and underweight. He is not toxic-appearing.   HENT:      Head: Normocephalic.      Nose: Nose normal.      Mouth/Throat:      Pharynx: Oropharynx is clear.   Eyes:      Extraocular Movements: Extraocular movements intact.      Pupils: Pupils are equal, round, and reactive to light.   Neck:      Thyroid: No thyroid mass.      Vascular: No carotid bruit.   Cardiovascular:      Rate and Rhythm: Normal rate and regular rhythm.      Pulses: Normal pulses.      Heart sounds: Normal heart sounds. No murmur heard.  Pulmonary:      Effort: Pulmonary effort is  normal.      Breath sounds: Normal breath sounds and air entry. No wheezing.   Abdominal:      General: Bowel sounds are normal. There is no distension.      Palpations: Abdomen is soft.      Tenderness: There is no abdominal tenderness.   Musculoskeletal:      Cervical back: Neck supple. No rigidity.      Comments:   Contractions to right side particularly right leg partially flexed at needs   Skin:     General: Skin is warm.      Coloration: Skin is not jaundiced.      Findings: No lesion.   Neurological:      General: No focal deficit present.      Mental Status: He is alert and oriented to person, place, and time.      Cranial Nerves: Dysarthria present. No cranial nerve deficit.      Comments:   Expressive aphasia    Dense right-sided hemiparesis with good strength on the left  Patient is able to move his left side to commands     Psychiatric:         Attention and Perception: Attention normal.         Mood and Affect: Mood normal.         Behavior: Behavior normal. Behavior is cooperative.         Thought Content: Thought content normal.         Cognition and Memory: Cognition normal.              CRANIAL NERVES     CN III, IV, VI   Pupils are equal, round, and reactive to light.       Significant Labs: All pertinent labs within the past 24 hours have been reviewed.  BMP:   Recent Labs   Lab 04/30/24  1015   GLU 95   *   K 3.6   *   CO2 27   BUN 67*   CREATININE 2.73*   CALCIUM 8.6     CBC:   Recent Labs   Lab 04/30/24  1015   WBC 18.17*   HGB 11.4*   HCT 38.7*        CMP:   Recent Labs   Lab 04/30/24  1015   *   K 3.6   *   CO2 27   GLU 95   BUN 67*   CREATININE 2.73*   CALCIUM 8.6   PROT 7.1   ALBUMIN 1.9*   BILITOT 0.3   ALKPHOS 100   AST 66*   ALT 88*   ANIONGAP 13       Significant Imaging: I have reviewed all pertinent imaging results/findings within the past 24 hours.    Imaging Results              CT Abdomen Pelvis  Without Contrast (Final result)  Result time 04/30/24  14:15:28      Final result by Tico Dela Cruz MD (04/30/24 14:15:28)                   Impression:      Large degree of stool within the rectum.  Mild diffuse more proximal colonic stool.  No bowel obstruction.    Cholelithiasis.  No evidence to suggest cholecystitis by this study.      Electronically signed by: Tico Dela Cruz  Date:    04/30/2024  Time:    14:15               Narrative:    EXAMINATION:  CT ABDOMEN PELVIS WITHOUT CONTRAST    CLINICAL HISTORY:  Abdominal abscess/infection suspected;Abdominal pain, acute, nonlocalized;abdominal pain;    TECHNIQUE:  Low dose axial images, sagittal and coronal reformations were obtained from the lung bases to the pubic symphysis.  Oral contrast was not administered. The CT examination was performed using one or more of the following dose reduction techniques: Automated exposure control, adjustment of the mA and kV according to patient's size, use of acute or iterative reconstruction techniques.    COMPARISON:  None    FINDINGS:  Lung bases are clear.  There is four-chamber mild cardiomegaly.  Coronary and thoracic aortic calcifications.    No focal hepatic lesion detected.  Cholelithiasis seen.  No inflammatory changes of the gallbladder to suggest cholecystitis by this CT.  There is a cyst seen inferiorly of the left kidney.  No hydronephrosis of either kidney.  Pancreas not well imaged.  Spleen unremarkable.    No pneumoperitoneum.  No ascites.  Mild diffuse colonic stool with note made that within the rectum there is a large degree of stool present.  No bowel obstruction.    No adenopathy.  Aortic and iliac calcifications.  Urinary bladder unremarkable.    Musculoskeletal: Diffuse degenerative changes of the spine and hips.  No acute fracture identified.                                       X-Ray Chest 1 View (Final result)  Result time 04/30/24 13:35:35      Final result by Tico Dela Cruz MD (04/30/24 13:35:35)                   Impression:      No acute  findings.      Electronically signed by: Tico Dela Cruz  Date:    04/30/2024  Time:    13:35               Narrative:    EXAMINATION:  XR CHEST 1 VIEW    CLINICAL HISTORY:  Weakness    TECHNIQUE:  Single frontal view of the chest was performed.    COMPARISON:  None    FINDINGS:  Heart size normal. Lungs clear. No pneumothorax or pleural effusion.                                    Intake/Output - Last 3 Shifts       None          Microbiology Results (last 7 days)       ** No results found for the last 168 hours. **

## 2024-05-01 NOTE — HPI
Chief complaint:  Progressive weakness    History of present illness:    Mr. Ambriz is a 83-year-old sent from [facility] in [place] with history of progressive weakness decreased appetite.  Had recent laboratory studies drawn showing evidence of dehydration with hypernatremia.  Patient unable to give history but discussed with wife by phone.  Patient started having strokes in 2008.  Has had progressive inability to care for himself.  Has had expressive aphasia and right-sided weakness.  Have not walked in a proximally 4 years.  Hospitalist service asked to see patient for evaluation admission.    Review of systems:  See above.  Patient has not been able to talk for awhile but according to wife has intermittently been able to be awake and seemed to communicate.  Wife states sometimes he gets sleepy.  Has had decreased appetite but no I have his choking episodes.  Has progressively lost weight.  Issues in past with constipation but wife's not aware of him having any previous impactions.  Wife states he is gotten up in the nursing home in a wheelchair almost daily.  Review of system otherwise negative.    Past medical history:  -hypertension treated in the past but has had episode of hypotension and taken off medication  -prostate cancer with prostatectomy in 2004  -seizure disorder initially seizures were manifest by patient smelling a bad smell.  Seizures had progressed and had more recently tonic-clonic seizure.  Wife states he has had many seizures in the past but she has not aware of any in the last 2 years.  -CVA diagnosed in 2008 with several seizures since then with progressive weakness now with expressive aphasia and dense right-sided paresthesia    Past surgical history:  Prostatectomy in 2004 as above mentioned    No known allergies    Social history:  Patient has been a resident [facility] since October 2023 and prior to that he was at home  No history tobacco alcohol use  Wife states  family has made patient previously DNR  Wife states patient had previously voiced that he wants no permanent feeding tube    Family history:  Father side with diabetes and heart disease  Mother side with hypertension    Health maintenance issues  Resident of [facility] in [place]  Had flu shot last fall  Had prior Pneumovax  No known prior COVID-19 infection  COVID vaccination x2  Wife's not aware of patient ever having any previous colonoscopy

## 2024-05-01 NOTE — PLAN OF CARE
[facility]  Initial Discharge Assessment       Primary Care Provider: No, Primary Doctor    Admission Diagnosis: Dehydration [E86.0]  Hypernatremia [E87.0]  General weakness [R53.1]  DENYS (acute kidney injury) [N17.9]  Urinary tract infection without hematuria, site unspecified [N39.0]    Admission Date: 4/30/2024  Expected Discharge Date:     Transition of Care Barriers: None    Payor: MEDICARE / Plan: MEDICARE PART A & B / Product Type: Government /     Extended Emergency Contact Information  Primary Emergency Contact: LACEY GUADARRAMA  Mobile Phone: [phone]  Relation: Spouse  Preferred language: English   needed? No    Discharge Plan A: Return to nursing home  Discharge Plan B: Return to Nursing Home    No Pharmacies Listed    Initial Assessment (most recent)       Adult Discharge Assessment - 05/01/24 1311          Discharge Assessment    Assessment Type Discharge Planning Assessment     Confirmed/corrected address, phone number and insurance Yes     Confirmed Demographics Correct on Facesheet     Source of Information family     People in Home facility resident     [facility] [place]     Do you expect to return to your current living situation? Yes     Do you have help at home or someone to help you manage your care at home? Yes     Who are your caregiver(s) and their phone number(s)? [facility]     Prior to hospitilization cognitive status: Unable to Assess     Current cognitive status: Unable to Assess     Home Accessibility wheelchair accessible     Home Layout Able to live on 1st floor     Equipment Currently Used at Home wheelchair     Readmission within 30 days? No     Patient currently being followed by outpatient case management? No     Do you currently have service(s) that help you manage your care at home? Yes     Name and Contact number of agency [facility] [place]     Is the pt/caregiver preference  to resume services with current agency Yes     Do you take prescription medications? Yes     Do you have prescription coverage? Yes     Coverage Medicare     Do you have any problems affording any of your prescribed medications? No     Is the patient taking medications as prescribed? yes     Who is going to help you get home at discharge[facility]     How do you get to doctors appointments? other (see comments)   [facility] arranges    Are you on dialysis? No     Do you take coumadin? No     Discharge Plan A Return to nursing home     Discharge Plan B Return to Nursing Home     DME Needed Upon Discharge  none     Discharge Plan discussed with: Spouse/sig other     Name(s) and Number(s) Margareth Ambriz [phone]     Transition of Care Barriers None        Physical Activity    On average, how many days per week do you engage in moderate to strenuous exercise (like a brisk walk)? 0 days     On average, how many minutes do you engage in exercise at this level? 0 min        Financial Resource Strain    How hard is it for you to pay for the very basics like food, housing, medical care, and heating? Not hard at all        Housing Stability    In the last 12 months, was there a time when you were not able to pay the mortgage or rent on time? No     At any time in the past 12 months, were you homeless or living in a shelter (including now)? No        Transportation Needs    In the past 12 months, has lack of transportation kept you from medical appointments or from getting medications? No     In the past 12 months, has lack of transportation kept you from meetings, work, or from getting things needed for daily living? No        Food Insecurity    Within the past 12 months, you worried that your food would run out before you got the money to buy more. Never true     Within the past 12 months, the food you bought just didn't last and you didn't have money to get more. Never true         Stress    Do you feel stress - tense, restless, nervous, or anxious, or unable to sleep at night because your mind is troubled all the time - these days? Not at all        Alcohol Use    Q1: How often do you have a drink containing alcohol? Never     Q2: How many drinks containing alcohol do you have on a typical day when you are drinking? Patient does not drink     Q3: How often do you have six or more drinks on one occasion? Never                   SS attempted to complete initial assessment with pt, pt unable to answer questions. SS contacted pt's wife, Margareth. Pt's spouse states pt is from [facility] and the plan is for the pt to return there when medically ready for dc. Packet started. Choice obtained verbally over the phone from pt's wife. Initial info faxed to [facility]. IM obtained verbally over the phone from pt's wife. Pt has needed DME. SDOH complete. Pt will need transportation back to [facility]. SS following for anticipated discharge needs.        SS spoke with Jory at [facility]. Jory stated that pt will need metro transportation back to facility because they do not have available drivers. [place] has a 5pm cut-off time.

## 2024-05-01 NOTE — ASSESSMENT & PLAN NOTE
Patient with dementia with likely etiology of vascular dementia. Dementia is moderate. The patient does not have signs of behavioral disturbance. Dementia medications are given. Continue non-pharmacologic interventions to prevent delirium (No VS between 11PM-5AM, activity during day, opening blinds, providing glasses/hearing aids, and up in chair during daytime). Will avoid narcotics and benzos unless absolutely necessary. PRN anti-psychotics are not prescribed to avoid self harm behaviors.

## 2024-05-02 LAB
ANION GAP SERPL CALCULATED.3IONS-SCNC: 12 MMOL/L (ref 7–16)
BASOPHILS # BLD AUTO: 0.02 K/UL (ref 0–0.2)
BASOPHILS NFR BLD AUTO: 0.3 % (ref 0–1)
BUN SERPL-MCNC: 46 MG/DL (ref 7–18)
BUN/CREAT SERPL: 22 (ref 6–20)
CALCIUM SERPL-MCNC: 8.8 MG/DL (ref 8.5–10.1)
CHLORIDE SERPL-SCNC: 114 MMOL/L (ref 98–107)
CO2 SERPL-SCNC: 25 MMOL/L (ref 21–32)
CREAT SERPL-MCNC: 2.05 MG/DL (ref 0.7–1.3)
DIFFERENTIAL METHOD BLD: ABNORMAL
EGFR (NO RACE VARIABLE) (RUSH/TITUS): 32 ML/MIN/1.73M2
EOSINOPHIL # BLD AUTO: 0.26 K/UL (ref 0–0.5)
EOSINOPHIL NFR BLD AUTO: 3.3 % (ref 1–4)
ERYTHROCYTE [DISTWIDTH] IN BLOOD BY AUTOMATED COUNT: 14.6 % (ref 11.5–14.5)
GLUCOSE SERPL-MCNC: 89 MG/DL (ref 74–106)
HCT VFR BLD AUTO: 40 % (ref 40–54)
HGB BLD-MCNC: 11.9 G/DL (ref 13.5–18)
IMM GRANULOCYTES # BLD AUTO: 0.07 K/UL (ref 0–0.04)
IMM GRANULOCYTES NFR BLD: 0.9 % (ref 0–0.4)
LYMPHOCYTES # BLD AUTO: 1.44 K/UL (ref 1–4.8)
LYMPHOCYTES NFR BLD AUTO: 18 % (ref 27–41)
MCH RBC QN AUTO: 27.5 PG (ref 27–31)
MCHC RBC AUTO-ENTMCNC: 29.8 G/DL (ref 32–36)
MCV RBC AUTO: 92.4 FL (ref 80–96)
MONOCYTES # BLD AUTO: 0.5 K/UL (ref 0–0.8)
MONOCYTES NFR BLD AUTO: 6.3 % (ref 2–6)
MPC BLD CALC-MCNC: 12.6 FL (ref 9.4–12.4)
NEUTROPHILS # BLD AUTO: 5.71 K/UL (ref 1.8–7.7)
NEUTROPHILS NFR BLD AUTO: 71.2 % (ref 53–65)
NRBC # BLD AUTO: 0 X10E3/UL
NRBC, AUTO (.00): 0 %
PLATELET # BLD AUTO: 203 K/UL (ref 150–400)
POTASSIUM SERPL-SCNC: 4.2 MMOL/L (ref 3.5–5.1)
RBC # BLD AUTO: 4.33 M/UL (ref 4.6–6.2)
SODIUM SERPL-SCNC: 147 MMOL/L (ref 136–145)
WBC # BLD AUTO: 8 K/UL (ref 4.5–11)

## 2024-05-02 PROCEDURE — 36415 COLL VENOUS BLD VENIPUNCTURE: CPT | Performed by: HOSPITALIST

## 2024-05-02 PROCEDURE — 25000003 PHARM REV CODE 250: Performed by: HOSPITALIST

## 2024-05-02 PROCEDURE — 63600175 PHARM REV CODE 636 W HCPCS: Performed by: HOSPITALIST

## 2024-05-02 PROCEDURE — S5010 5% DEXTROSE AND 0.45% SALINE: HCPCS | Performed by: HOSPITALIST

## 2024-05-02 PROCEDURE — 85025 COMPLETE CBC W/AUTO DIFF WBC: CPT | Performed by: HOSPITALIST

## 2024-05-02 PROCEDURE — 80048 BASIC METABOLIC PNL TOTAL CA: CPT | Performed by: HOSPITALIST

## 2024-05-02 PROCEDURE — 11000001 HC ACUTE MED/SURG PRIVATE ROOM

## 2024-05-02 RX ORDER — LACTULOSE 10 G/15ML
200 SOLUTION ORAL; RECTAL 2 TIMES DAILY
Status: DISCONTINUED | OUTPATIENT
Start: 2024-05-02 | End: 2024-05-03 | Stop reason: HOSPADM

## 2024-05-02 RX ORDER — DEXTROSE MONOHYDRATE AND SODIUM CHLORIDE 5; .45 G/100ML; G/100ML
INJECTION, SOLUTION INTRAVENOUS CONTINUOUS
Status: DISCONTINUED | OUTPATIENT
Start: 2024-05-02 | End: 2024-05-03 | Stop reason: HOSPADM

## 2024-05-02 RX ORDER — LACTULOSE 10 G/15ML
20 SOLUTION ORAL 2 TIMES DAILY
Status: DISCONTINUED | OUTPATIENT
Start: 2024-05-02 | End: 2024-05-03 | Stop reason: HOSPADM

## 2024-05-02 RX ADMIN — CLOPIDOGREL BISULFATE 75 MG: 75 TABLET ORAL at 09:05

## 2024-05-02 RX ADMIN — DEXTROSE AND SODIUM CHLORIDE: 5; 450 INJECTION, SOLUTION INTRAVENOUS at 09:05

## 2024-05-02 RX ADMIN — ASPIRIN 81 MG: 81 TABLET, COATED ORAL at 09:05

## 2024-05-02 RX ADMIN — HEPARIN SODIUM 5000 UNITS: 5000 INJECTION, SOLUTION INTRAVENOUS; SUBCUTANEOUS at 05:05

## 2024-05-02 RX ADMIN — LACTULOSE 20 G: 20 SOLUTION ORAL at 09:05

## 2024-05-02 RX ADMIN — PRAVASTATIN SODIUM 80 MG: 40 TABLET ORAL at 09:05

## 2024-05-02 RX ADMIN — PANTOPRAZOLE SODIUM 40 MG: 40 GRANULE, DELAYED RELEASE ORAL at 09:05

## 2024-05-02 RX ADMIN — LEVETIRACETAM 500 MG: 500 TABLET, FILM COATED ORAL at 09:05

## 2024-05-02 RX ADMIN — HEPARIN SODIUM 5000 UNITS: 5000 INJECTION, SOLUTION INTRAVENOUS; SUBCUTANEOUS at 09:05

## 2024-05-02 RX ADMIN — MIRTAZAPINE 15 MG: 15 TABLET, FILM COATED ORAL at 09:05

## 2024-05-02 RX ADMIN — DEXTROSE AND SODIUM CHLORIDE 100 ML/HR: 5; 200 INJECTION, SOLUTION INTRAVENOUS at 03:05

## 2024-05-02 RX ADMIN — HEPARIN SODIUM 5000 UNITS: 5000 INJECTION, SOLUTION INTRAVENOUS; SUBCUTANEOUS at 02:05

## 2024-05-02 RX ADMIN — LATANOPROST 1 DROP: 50 SOLUTION OPHTHALMIC at 09:05

## 2024-05-02 RX ADMIN — LACTULOSE 200 G: 20 SOLUTION ORAL at 09:05

## 2024-05-02 NOTE — ASSESSMENT & PLAN NOTE
Treat constipation room remove fecal impaction  Maybe this will help increase appetite    No bowel movement will need to add enema

## 2024-05-02 NOTE — PROGRESS NOTES
[facility] - [address] [place] [address] Medicine  Progress Note    Patient Name: Claude Patrick  MRN: [id number]  Patient Class: IP- Inpatient   Admission Date: 4/30/2024  Length of Stay: 1 days  Attending Physician: Marcus Martinez MD  Primary Care Provider: Tita, Primary Doctor        Subjective:     Principal Problem:Dehydration with hypernatremia    HPI:  Chief complaint:  Progressive weakness    History of present illness:    Mr. Ambriz is a 83-year-old sent from [facility] in [place] with history of progressive weakness decreased appetite.  Had recent laboratory studies drawn showing evidence of dehydration with hypernatremia.  Patient unable to give history but discussed with wife by phone.  Patient started having strokes in 2008.  Has had progressive inability to care for himself.  Has had expressive aphasia and right-sided weakness.  Have not walked in a proximally 4 years.  Hospitalist service asked to see patient for evaluation admission.    Review of systems:  See above.  Patient has not been able to talk for awhile but according to wife has intermittently been able to be awake and seemed to communicate.  Wife states sometimes he gets sleepy.  Has had decreased appetite but no I have his choking episodes.  Has progressively lost weight.  Issues in past with constipation but wife's not aware of him having any previous impactions.  Wife states he is gotten up in the nursing home in a wheelchair almost daily.  Review of system otherwise negative.    Past medical history:  -hypertension treated in the past but has had episode of hypotension and taken off medication  -prostate cancer with prostatectomy in 2004  -seizure disorder initially seizures were manifest by patient smelling a bad smell.  Seizures had progressed and had more recently tonic-clonic seizure.  Wife states he has had many seizures in the past but she has not aware of any in the last 2 years.  -CVA diagnosed in  2008 with several seizures since then with progressive weakness now with expressive aphasia and dense right-sided paresthesia    Past surgical history:  Prostatectomy in 2004 as above mentioned    No known allergies    Social history:  Patient has been a resident [facility] since October 2023 and prior to that he was at home  No history tobacco alcohol use  Wife states family has made patient previously DNR  Wife states patient had previously voiced that he wants no permanent feeding tube    Family history:  Father side with diabetes and heart disease  Mother side with hypertension    Health maintenance issues  Resident of [facility] in [place]  Had flu shot last fall  Had prior Pneumovax  No known prior COVID-19 infection  COVID vaccination x2  Wife's not aware of patient ever having any previous colonoscopy        Overview/Hospital Course:  No notes on file    Interval History:     Review of Systems   Constitutional:  Positive for appetite change, fatigue and unexpected weight change. Negative for fever.   HENT:  Negative for congestion, hearing loss and trouble swallowing.    Respiratory:  Negative for chest tightness, shortness of breath and wheezing.    Cardiovascular:  Negative for chest pain and palpitations.   Gastrointestinal:  Negative for abdominal pain, constipation and nausea.   Genitourinary:  Negative for difficulty urinating and dysuria.   Musculoskeletal:  Positive for gait problem. Negative for back pain and neck stiffness.   Skin:  Negative for pallor and rash.   Neurological:  Positive for speech difficulty. Negative for dizziness and headaches.   Psychiatric/Behavioral:  Negative for confusion and suicidal ideas.      Objective:     Vital Signs (Most Recent):  Temp: 97.5 °F (36.4 °C) (05/01/24 1858)  Pulse: 73 (05/01/24 1858)  Resp: 14 (05/01/24 1858)  BP: (!) 146/79 (05/01/24 1858)  SpO2: 98 % (05/01/24 1858) Vital Signs (24h Range):  Temp:  [97.4 °F (36.3 °C)-98.1 °F (36.7  °C)] 97.5 °F (36.4 °C)  Pulse:  [] 73  Resp:  [14-18] 14  SpO2:  [98 %-100 %] 98 %  BP: ()/() 146/79     Weight: 63.8 kg (140 lb 10.5 oz)  Body mass index is 19.08 kg/m².    Intake/Output Summary (Last 24 hours) at 5/1/2024 2152  Last data filed at 5/1/2024 2141  Gross per 24 hour   Intake 2708.33 ml   Output --   Net 2708.33 ml         Physical Exam  Vitals reviewed.   Constitutional:       General: He is awake. He is not in acute distress.     Appearance: He is well-developed and underweight. He is not toxic-appearing.   HENT:      Head: Normocephalic.      Nose: Nose normal.      Mouth/Throat:      Pharynx: Oropharynx is clear.   Eyes:      Extraocular Movements: Extraocular movements intact.      Pupils: Pupils are equal, round, and reactive to light.   Neck:      Thyroid: No thyroid mass.      Vascular: No carotid bruit.   Cardiovascular:      Rate and Rhythm: Normal rate and regular rhythm.      Pulses: Normal pulses.      Heart sounds: Normal heart sounds. No murmur heard.  Pulmonary:      Effort: Pulmonary effort is normal.      Breath sounds: Normal breath sounds and air entry. No wheezing.   Abdominal:      General: Bowel sounds are normal. There is no distension.      Palpations: Abdomen is soft.      Tenderness: There is no abdominal tenderness.   Musculoskeletal:      Cervical back: Neck supple. No rigidity.      Comments:   Contractions to right side particularly right leg partially flexed at needs   Skin:     General: Skin is warm.      Coloration: Skin is not jaundiced.      Findings: No lesion.   Neurological:      General: No focal deficit present.      Mental Status: He is alert and oriented to person, place, and time.      Cranial Nerves: Dysarthria present. No cranial nerve deficit.      Comments:   Expressive aphasia    Dense right-sided hemiparesis with good strength on the left  Patient is able to move his left side to commands     Psychiatric:         Attention and  Perception: Attention normal.         Mood and Affect: Mood normal.         Behavior: Behavior normal. Behavior is cooperative.         Thought Content: Thought content normal.         Cognition and Memory: Cognition normal.             Significant Labs: All pertinent labs within the past 24 hours have been reviewed.    Significant Imaging: I have reviewed all pertinent imaging results/findings within the past 24 hours.    Assessment/Plan:      * Dehydration with hypernatremia    Hydrate and monitor electrolytes    5/1: Continue with hydration.    History of prostate cancer    Treated with prostatectomy in 2004  Rechecked PSA    Prerenal azotemia    Hydrate    Seizure disorder  Continue Keppra    History of multiple strokes    Supportive care    Systolic hypertension  Chronic, controlled. Latest blood pressure and vitals reviewed-     Temp:  [97.4 °F (36.3 °C)-98.3 °F (36.8 °C)]   Pulse:  [63-84]   Resp:  [14-16]   BP: ()/()   SpO2:  [95 %-100 %] .   Home meds for hypertension were reviewed and noted below.       While in the hospital, will manage blood pressure as follows; Adjust home antihypertensive regimen as follows- cautiously control blood pressure with history of recent hypotension to hypertensive medicines    Will utilize p.r.n. blood pressure medication only if patient's blood pressure greater than 180/110 and he develops symptoms such as worsening chest pain or shortness of breath.    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 Dementia is moderate. The patient does not have signs of behavioral disturbance. Dementia medications are given. Continue non-pharmacologic interventions to prevent delirium (No VS between 11PM-5AM, activity during day, opening blinds, providing glasses/hearing aids, and up in chair during daytime). Will avoid narcotics and benzos unless  absolutely necessary. PRN anti-psychotics are not prescribed to avoid self harm behaviors.    Failure to thrive in adult    Encourage oral intake  Treat constipation and remove fecal impaction  Family plans to honor patient wishes not to have PEG tube  Told wife to encourage oral intake throughout the day  Continue with current care    Fecal impaction in rectum    Treat constipation room remove fecal impaction  Maybe this will help increase appetite    No bowel movement will need to add enema      VTE Risk Mitigation (From admission, onward)           Ordered     heparin (porcine) injection 5,000 Units  Every 8 hours         04/30/24 1757     IP VTE HIGH RISK PATIENT  Once         04/30/24 1757     Place sequential compression device  Until discontinued         04/30/24 1757                    Discharge Planning   SHAYY: 5/2/2024     Code Status: DNR   Is the patient medically ready for discharge?:     Reason for patient still in hospital (select all that apply): Treatment  Discharge Plan A: Return to nursing home                  Marcus Martinez MD  Department of Hospital Medicine   [facility]

## 2024-05-02 NOTE — SUBJECTIVE & OBJECTIVE
Interval History:     Review of Systems   Constitutional:  Positive for appetite change, fatigue and unexpected weight change. Negative for fever.   HENT:  Negative for congestion, hearing loss and trouble swallowing.    Respiratory:  Negative for chest tightness, shortness of breath and wheezing.    Cardiovascular:  Negative for chest pain and palpitations.   Gastrointestinal:  Negative for abdominal pain, constipation and nausea.   Genitourinary:  Negative for difficulty urinating and dysuria.   Musculoskeletal:  Positive for gait problem. Negative for back pain and neck stiffness.   Skin:  Negative for pallor and rash.   Neurological:  Positive for speech difficulty. Negative for dizziness and headaches.   Psychiatric/Behavioral:  Negative for confusion and suicidal ideas.      Objective:     Vital Signs (Most Recent):  Temp: 97.5 °F (36.4 °C) (05/01/24 1858)  Pulse: 73 (05/01/24 1858)  Resp: 14 (05/01/24 1858)  BP: (!) 146/79 (05/01/24 1858)  SpO2: 98 % (05/01/24 1858) Vital Signs (24h Range):  Temp:  [97.4 °F (36.3 °C)-98.1 °F (36.7 °C)] 97.5 °F (36.4 °C)  Pulse:  [] 73  Resp:  [14-18] 14  SpO2:  [98 %-100 %] 98 %  BP: ()/() 146/79     Weight: 63.8 kg (140 lb 10.5 oz)  Body mass index is 19.08 kg/m².    Intake/Output Summary (Last 24 hours) at 5/1/2024 2152  Last data filed at 5/1/2024 2141  Gross per 24 hour   Intake 2708.33 ml   Output --   Net 2708.33 ml         Physical Exam  Vitals reviewed.   Constitutional:       General: He is awake. He is not in acute distress.     Appearance: He is well-developed and underweight. He is not toxic-appearing.   HENT:      Head: Normocephalic.      Nose: Nose normal.      Mouth/Throat:      Pharynx: Oropharynx is clear.   Eyes:      Extraocular Movements: Extraocular movements intact.      Pupils: Pupils are equal, round, and reactive to light.   Neck:      Thyroid: No thyroid mass.      Vascular: No carotid bruit.   Cardiovascular:      Rate and Rhythm:  Normal rate and regular rhythm.      Pulses: Normal pulses.      Heart sounds: Normal heart sounds. No murmur heard.  Pulmonary:      Effort: Pulmonary effort is normal.      Breath sounds: Normal breath sounds and air entry. No wheezing.   Abdominal:      General: Bowel sounds are normal. There is no distension.      Palpations: Abdomen is soft.      Tenderness: There is no abdominal tenderness.   Musculoskeletal:      Cervical back: Neck supple. No rigidity.      Comments:   Contractions to right side particularly right leg partially flexed at needs   Skin:     General: Skin is warm.      Coloration: Skin is not jaundiced.      Findings: No lesion.   Neurological:      General: No focal deficit present.      Mental Status: He is alert and oriented to person, place, and time.      Cranial Nerves: Dysarthria present. No cranial nerve deficit.      Comments:   Expressive aphasia    Dense right-sided hemiparesis with good strength on the left  Patient is able to move his left side to commands     Psychiatric:         Attention and Perception: Attention normal.         Mood and Affect: Mood normal.         Behavior: Behavior normal. Behavior is cooperative.         Thought Content: Thought content normal.         Cognition and Memory: Cognition normal.             Significant Labs: All pertinent labs within the past 24 hours have been reviewed.    Significant Imaging: I have reviewed all pertinent imaging results/findings within the past 24 hours.

## 2024-05-02 NOTE — ASSESSMENT & PLAN NOTE
Encourage oral intake  Treat constipation and remove fecal impaction  Family plans to honor patient wishes not to have PEG tube  Told wife to encourage oral intake throughout the day  Continue with current care

## 2024-05-03 VITALS
BODY MASS INDEX: 20.45 KG/M2 | HEIGHT: 72 IN | WEIGHT: 151 LBS | SYSTOLIC BLOOD PRESSURE: 109 MMHG | OXYGEN SATURATION: 97 % | DIASTOLIC BLOOD PRESSURE: 94 MMHG | RESPIRATION RATE: 20 BRPM | TEMPERATURE: 99 F | HEART RATE: 80 BPM

## 2024-05-03 PROCEDURE — 25000003 PHARM REV CODE 250: Performed by: HOSPITALIST

## 2024-05-03 PROCEDURE — 99239 HOSP IP/OBS DSCHRG MGMT >30: CPT | Mod: ,,, | Performed by: HOSPITALIST

## 2024-05-03 PROCEDURE — 63600175 PHARM REV CODE 636 W HCPCS: Performed by: HOSPITALIST

## 2024-05-03 RX ADMIN — LACTULOSE 20 G: 20 SOLUTION ORAL at 08:05

## 2024-05-03 RX ADMIN — LEVETIRACETAM 500 MG: 500 TABLET, FILM COATED ORAL at 08:05

## 2024-05-03 RX ADMIN — PANTOPRAZOLE SODIUM 40 MG: 40 GRANULE, DELAYED RELEASE ORAL at 08:05

## 2024-05-03 RX ADMIN — ASPIRIN 81 MG: 81 TABLET, COATED ORAL at 08:05

## 2024-05-03 RX ADMIN — HEPARIN SODIUM 5000 UNITS: 5000 INJECTION, SOLUTION INTRAVENOUS; SUBCUTANEOUS at 05:05

## 2024-05-03 RX ADMIN — CLOPIDOGREL BISULFATE 75 MG: 75 TABLET ORAL at 08:05

## 2024-05-03 NOTE — ASSESSMENT & PLAN NOTE
Hydrate and monitor electrolytes    5/1: Continue with hydration.    5/2:  D5 half-normal saline.  Continue with aggressive fluids.

## 2024-05-03 NOTE — ASSESSMENT & PLAN NOTE
Treat constipation room remove fecal impaction  Maybe this will help increase appetite    No bowel movement will need to add enema    5/2:  Add an enema to MiraLax and other laxatives.    Continue stool softners and enema.

## 2024-05-03 NOTE — PROGRESS NOTES
[facility] - [address] [place] [address] Medicine  Progress Note    Patient Name: Claude Patrick  MRN: [id number]  Patient Class: IP- Inpatient   Admission Date: 4/30/2024  Length of Stay: 2 days  Attending Physician: Marcus Martinez MD  Primary Care Provider: Tita, Primary Doctor        Subjective:     Principal Problem:Dehydration with hypernatremia    HPI:  Chief complaint:  Progressive weakness    History of present illness:    Mr. Ambriz is a 83-year-old sent from [facility] in [place] with history of progressive weakness decreased appetite.  Had recent laboratory studies drawn showing evidence of dehydration with hypernatremia.  Patient unable to give history but discussed with wife by phone.  Patient started having strokes in 2008.  Has had progressive inability to care for himself.  Has had expressive aphasia and right-sided weakness.  Have not walked in a proximally 4 years.  Hospitalist service asked to see patient for evaluation admission.    Review of systems:  See above.  Patient has not been able to talk for awhile but according to wife has intermittently been able to be awake and seemed to communicate.  Wife states sometimes he gets sleepy.  Has had decreased appetite but no I have his choking episodes.  Has progressively lost weight.  Issues in past with constipation but wife's not aware of him having any previous impactions.  Wife states he is gotten up in the nursing home in a wheelchair almost daily.  Review of system otherwise negative.    Past medical history:  -hypertension treated in the past but has had episode of hypotension and taken off medication  -prostate cancer with prostatectomy in 2004  -seizure disorder initially seizures were manifest by patient smelling a bad smell.  Seizures had progressed and had more recently tonic-clonic seizure.  Wife states he has had many seizures in the past but she has not aware of any in the last 2 years.  -CVA diagnosed in  2008 with several seizures since then with progressive weakness now with expressive aphasia and dense right-sided paresthesia    Past surgical history:  Prostatectomy in 2004 as above mentioned    No known allergies    Social history:  Patient has been a resident [facility] since October 2023 and prior to that he was at home  No history tobacco alcohol use  Wife states family has made patient previously DNR  Wife states patient had previously voiced that he wants no permanent feeding tube    Family history:  Father side with diabetes and heart disease  Mother side with hypertension    Health maintenance issues  Resident of [facility] in [place]  Had flu shot last fall  Had prior Pneumovax  No known prior COVID-19 infection  COVID vaccination x2  Wife's not aware of patient ever having any previous colonoscopy        Overview/Hospital Course:  No notes on file    Interval History:     Review of Systems   Constitutional:  Positive for appetite change, fatigue and unexpected weight change. Negative for fever.   HENT:  Negative for congestion, hearing loss and trouble swallowing.    Respiratory:  Negative for chest tightness, shortness of breath and wheezing.    Cardiovascular:  Negative for chest pain and palpitations.   Gastrointestinal:  Negative for abdominal pain, constipation and nausea.   Genitourinary:  Negative for difficulty urinating and dysuria.   Musculoskeletal:  Positive for gait problem. Negative for back pain and neck stiffness.   Skin:  Negative for pallor and rash.   Neurological:  Positive for speech difficulty. Negative for dizziness and headaches.   Psychiatric/Behavioral:  Negative for confusion and suicidal ideas.      Objective:     Vital Signs (Most Recent):  Temp: 98.1 °F (36.7 °C) (05/02/24 1455)  Pulse: 76 (05/02/24 1455)  Resp: 18 (05/02/24 1455)  BP: (!) 157/90 (05/02/24 1455)  SpO2: 96 % (05/02/24 1455) Vital Signs (24h Range):  Temp:  [97 °F (36.1 °C)-98.2 °F (36.8  °C)] 98.1 °F (36.7 °C)  Pulse:  [68-83] 76  Resp:  [16-18] 18  SpO2:  [96 %-98 %] 96 %  BP: ()/() 157/90     Weight: 67.9 kg (149 lb 11.1 oz)  Body mass index is 20.3 kg/m².    Intake/Output Summary (Last 24 hours) at 5/2/2024 2129  Last data filed at 5/2/2024 0600  Gross per 24 hour   Intake 900 ml   Output --   Net 900 ml         Physical Exam  Vitals reviewed.   Constitutional:       General: He is awake. He is not in acute distress.     Appearance: He is well-developed and underweight. He is not toxic-appearing.   HENT:      Head: Normocephalic.      Nose: Nose normal.      Mouth/Throat:      Pharynx: Oropharynx is clear.   Eyes:      Extraocular Movements: Extraocular movements intact.      Pupils: Pupils are equal, round, and reactive to light.   Neck:      Thyroid: No thyroid mass.      Vascular: No carotid bruit.   Cardiovascular:      Rate and Rhythm: Normal rate and regular rhythm.      Pulses: Normal pulses.      Heart sounds: Normal heart sounds. No murmur heard.  Pulmonary:      Effort: Pulmonary effort is normal.      Breath sounds: Normal breath sounds and air entry. No wheezing.   Abdominal:      General: Bowel sounds are normal. There is no distension.      Palpations: Abdomen is soft.      Tenderness: There is no abdominal tenderness.   Musculoskeletal:      Cervical back: Neck supple. No rigidity.      Comments:   Contractions to right side particularly right leg partially flexed at needs   Skin:     General: Skin is warm.      Coloration: Skin is not jaundiced.      Findings: No lesion.   Neurological:      General: No focal deficit present.      Mental Status: He is alert and oriented to person, place, and time.      Cranial Nerves: Dysarthria present. No cranial nerve deficit.      Comments:   Expressive aphasia    Dense right-sided hemiparesis with good strength on the left  Patient is able to move his left side to commands     Psychiatric:         Attention and Perception:  Attention normal.         Mood and Affect: Mood normal.         Behavior: Behavior normal. Behavior is cooperative.         Thought Content: Thought content normal.         Cognition and Memory: Cognition normal.             Significant Labs: All pertinent labs within the past 24 hours have been reviewed.    Significant Imaging: I have reviewed all pertinent imaging results/findings within the past 24 hours.    Assessment/Plan:      * Dehydration with hypernatremia    Hydrate and monitor electrolytes    5/1: Continue with hydration.    5/2:  D5 half-normal saline.  Continue with aggressive fluids.    History of prostate cancer    Treated with prostatectomy in 2004  Rechecked PSA    Prerenal azotemia    Hydrate    Seizure disorder  Continue Keppra    History of multiple strokes    Supportive care    Systolic hypertension  Chronic, controlled. Latest blood pressure and vitals reviewed-     Temp:  [97.4 °F (36.3 °C)-98.3 °F (36.8 °C)]   Pulse:  [63-84]   Resp:  [14-16]   BP: ()/()   SpO2:  [95 %-100 %] .   Home meds for hypertension were reviewed and noted below.       While in the hospital, will manage blood pressure as follows; Adjust home antihypertensive regimen as follows- cautiously control blood pressure with history of recent hypotension to hypertensive medicines    Will utilize p.r.n. blood pressure medication only if patient's blood pressure greater than 180/110 and he develops symptoms such as worsening chest pain or shortness of breath.    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 Dementia is moderate. The patient does not have signs of behavioral disturbance. Dementia medications are given. Continue non-pharmacologic interventions to prevent delirium (No VS between 11PM-5AM, activity during day, opening blinds, providing glasses/hearing aids, and up in chair  during daytime). Will avoid narcotics and benzos unless absolutely necessary. PRN anti-psychotics are not prescribed to avoid self harm behaviors.    Failure to thrive in adult    Encourage oral intake  Treat constipation and remove fecal impaction  Family plans to honor patient wishes not to have PEG tube  Told wife to encourage oral intake throughout the day  Continue with current care    Fecal impaction in rectum    Treat constipation room remove fecal impaction  Maybe this will help increase appetite    No bowel movement will need to add enema    5/2:  Add an enema to MiraLax and other laxatives.      VTE Risk Mitigation (From admission, onward)           Ordered     heparin (porcine) injection 5,000 Units  Every 8 hours         04/30/24 1757     IP VTE HIGH RISK PATIENT  Once         04/30/24 1757     Place sequential compression device  Until discontinued         04/30/24 1757                    Discharge Planning   SHAYY: 5/2/2024     Code Status: DNR   Is the patient medically ready for discharge?:     Reason for patient still in hospital (select all that apply): Treatment  Discharge Plan A: Return to nursing home                  Marcus Martinez MD  Department of Hospital Medicine   [facility] - [facility] Tele[facility]

## 2024-05-03 NOTE — ASSESSMENT & PLAN NOTE
Chronic, controlled. Latest blood pressure and vitals reviewed-     Temp:  [97.5 °F (36.4 °C)-98.6 °F (37 °C)]   Pulse:  [71-86]   Resp:  [18-20]   BP: (104-166)/()   SpO2:  [96 %-100 %] .   Home meds for hypertension were reviewed and noted below.       While in the hospital, will manage blood pressure as follows; Adjust home antihypertensive regimen as follows- cautiously control blood pressure with history of recent hypotension to hypertensive medicines    Will utilize p.r.n. blood pressure medication only if patient's blood pressure greater than 180/110 and he develops symptoms such as worsening chest pain or shortness of breath.

## 2024-05-03 NOTE — PLAN OF CARE
[facility] Telemetry  Discharge Final Note    Primary Care Provider: Tita Primary Doctor    Expected Discharge Date: 5/3/2024    Final Discharge Note (most recent)       Final Note - 05/03/24 1243          Final Note    Assessment Type Final Discharge Note     Anticipated Discharge Disposition Skilled Nursing Facility        Post-Acute Status    Post-Acute Authorization Placement     Post-Acute Placement Status Set-up Complete/Auth obtained     Patient choice form signed by patient/caregiver List with quality metrics by geographic area provided;List from CMS Compare     Discharge Delays None known at this time                     Important Message from Medicare  Important Message from Medicare regarding Discharge Appeal Rights: Given to patient/caregiver, Explained to patient/caregiver, Signed/date by patient/caregiver     Date IMM was signed: 05/03/24  Time IMM was signed: 1241    After-discharge care                Destination       [facility]   Service: Nursing Home    [address] [facility] [place] [address]   Phone: [phone]                   Pt for dc back to [facility] clinicals faxed

## 2024-05-03 NOTE — SUBJECTIVE & OBJECTIVE
Interval History:     Review of Systems   Constitutional:  Positive for appetite change, fatigue and unexpected weight change. Negative for fever.   HENT:  Negative for congestion, hearing loss and trouble swallowing.    Respiratory:  Negative for chest tightness, shortness of breath and wheezing.    Cardiovascular:  Negative for chest pain and palpitations.   Gastrointestinal:  Negative for abdominal pain, constipation and nausea.   Genitourinary:  Negative for difficulty urinating and dysuria.   Musculoskeletal:  Positive for gait problem. Negative for back pain and neck stiffness.   Skin:  Negative for pallor and rash.   Neurological:  Positive for speech difficulty. Negative for dizziness and headaches.   Psychiatric/Behavioral:  Negative for confusion and suicidal ideas.      Objective:     Vital Signs (Most Recent):  Temp: 98.1 °F (36.7 °C) (05/02/24 1455)  Pulse: 76 (05/02/24 1455)  Resp: 18 (05/02/24 1455)  BP: (!) 157/90 (05/02/24 1455)  SpO2: 96 % (05/02/24 1455) Vital Signs (24h Range):  Temp:  [97 °F (36.1 °C)-98.2 °F (36.8 °C)] 98.1 °F (36.7 °C)  Pulse:  [68-83] 76  Resp:  [16-18] 18  SpO2:  [96 %-98 %] 96 %  BP: ()/() 157/90     Weight: 67.9 kg (149 lb 11.1 oz)  Body mass index is 20.3 kg/m².    Intake/Output Summary (Last 24 hours) at 5/2/2024 2129  Last data filed at 5/2/2024 0600  Gross per 24 hour   Intake 900 ml   Output --   Net 900 ml         Physical Exam  Vitals reviewed.   Constitutional:       General: He is awake. He is not in acute distress.     Appearance: He is well-developed and underweight. He is not toxic-appearing.   HENT:      Head: Normocephalic.      Nose: Nose normal.      Mouth/Throat:      Pharynx: Oropharynx is clear.   Eyes:      Extraocular Movements: Extraocular movements intact.      Pupils: Pupils are equal, round, and reactive to light.   Neck:      Thyroid: No thyroid mass.      Vascular: No carotid bruit.   Cardiovascular:      Rate and Rhythm: Normal rate  and regular rhythm.      Pulses: Normal pulses.      Heart sounds: Normal heart sounds. No murmur heard.  Pulmonary:      Effort: Pulmonary effort is normal.      Breath sounds: Normal breath sounds and air entry. No wheezing.   Abdominal:      General: Bowel sounds are normal. There is no distension.      Palpations: Abdomen is soft.      Tenderness: There is no abdominal tenderness.   Musculoskeletal:      Cervical back: Neck supple. No rigidity.      Comments:   Contractions to right side particularly right leg partially flexed at needs   Skin:     General: Skin is warm.      Coloration: Skin is not jaundiced.      Findings: No lesion.   Neurological:      General: No focal deficit present.      Mental Status: He is alert and oriented to person, place, and time.      Cranial Nerves: Dysarthria present. No cranial nerve deficit.      Comments:   Expressive aphasia    Dense right-sided hemiparesis with good strength on the left  Patient is able to move his left side to commands     Psychiatric:         Attention and Perception: Attention normal.         Mood and Affect: Mood normal.         Behavior: Behavior normal. Behavior is cooperative.         Thought Content: Thought content normal.         Cognition and Memory: Cognition normal.             Significant Labs: All pertinent labs within the past 24 hours have been reviewed.    Significant Imaging: I have reviewed all pertinent imaging results/findings within the past 24 hours.

## 2024-05-03 NOTE — ASSESSMENT & PLAN NOTE
Treat constipation room remove fecal impaction  Maybe this will help increase appetite    No bowel movement will need to add enema    5/2:  Add an enema to MiraLax and other laxatives.

## 2024-05-03 NOTE — ASSESSMENT & PLAN NOTE
Hydrate and monitor electrolytes    5/1: Continue with hydration.    5/2:  D5 half-normal saline.  Continue with aggressive fluids.    Improved.     Needs feeding by hand.

## 2024-05-03 NOTE — DISCHARGE SUMMARY
[facility] - [address] [place] [address] Medicine  Discharge Summary      Patient Name: Claude Patrick  MRN: [id number]  FABI: [id number]  Patient Class: IP- Inpatient  Admission Date: 4/30/2024  Hospital Length of Stay: 3 days  Discharge Date and Time:  05/03/2024 12:29 PM  Attending Physician: Marcus Martinez MD   Discharging Provider: Marcus Martinez MD  Primary Care Provider: Tita Primary Doctor    Primary Care Team: Networked reference to record PCT     HPI:   Chief complaint:  Progressive weakness    History of present illness:    Mr. Ambriz is a 83-year-old sent from [facility] in [place] with history of progressive weakness decreased appetite.  Had recent laboratory studies drawn showing evidence of dehydration with hypernatremia.  Patient unable to give history but discussed with wife by phone.  Patient started having strokes in 2008.  Has had progressive inability to care for himself.  Has had expressive aphasia and right-sided weakness.  Have not walked in a proximally 4 years.  Hospitalist service asked to see patient for evaluation admission.    Review of systems:  See above.  Patient has not been able to talk for awhile but according to wife has intermittently been able to be awake and seemed to communicate.  Wife states sometimes he gets sleepy.  Has had decreased appetite but no I have his choking episodes.  Has progressively lost weight.  Issues in past with constipation but wife's not aware of him having any previous impactions.  Wife states he is gotten up in the nursing home in a wheelchair almost daily.  Review of system otherwise negative.    Past medical history:  -hypertension treated in the past but has had episode of hypotension and taken off medication  -prostate cancer with prostatectomy in 2004  -seizure disorder initially seizures were manifest by patient smelling a bad smell.  Seizures had progressed and had more recently tonic-clonic seizure.  Wife  states he has had many seizures in the past but she has not aware of any in the last 2 years.  -CVA diagnosed in 2008 with several seizures since then with progressive weakness now with expressive aphasia and dense right-sided paresthesia    Past surgical history:  Prostatectomy in 2004 as above mentioned    No known allergies    Social history:  Patient has been a resident [facility] since October 2023 and prior to that he was at home  No history tobacco alcohol use  Wife states family has made patient previously DNR  Wife states patient had previously voiced that he wants no permanent feeding tube    Family history:  Father side with diabetes and heart disease  Mother side with hypertension    Health maintenance issues  Resident of [facility] in [place]  Had flu shot last fall  Had prior Pneumovax  No known prior COVID-19 infection  COVID vaccination x2  Wife's not aware of patient ever having any previous colonoscopy        * No surgery found *      Hospital Course:   No notes on file     Goals of Care Treatment Preferences:  Code Status: DNR      Consults:     Neuro  Seizure disorder  Continue Keppra    History of multiple strokes    Supportive care    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 Dementia is moderate. The patient does not have signs of behavioral disturbance. Dementia medications are given. Continue non-pharmacologic interventions to prevent delirium (No VS between 11PM-5AM, activity during day, opening blinds, providing glasses/hearing aids, and up in chair during daytime). Will avoid narcotics and benzos unless absolutely necessary. PRN anti-psychotics are not prescribed to avoid self harm behaviors.    Cardiac/Vascular  Systolic hypertension  Chronic, controlled. Latest blood pressure and vitals reviewed-     Temp:  [97.5 °F (36.4 °C)-98.6 °F  (37 °C)]   Pulse:  [71-86]   Resp:  [18-20]   BP: (104-166)/()   SpO2:  [96 %-100 %] .   Home meds for hypertension were reviewed and noted below.       While in the hospital, will manage blood pressure as follows; Adjust home antihypertensive regimen as follows- cautiously control blood pressure with history of recent hypotension to hypertensive medicines    Will utilize p.r.n. blood pressure medication only if patient's blood pressure greater than 180/110 and he develops symptoms such as worsening chest pain or shortness of breath.    Renal/  * Dehydration with hypernatremia    Hydrate and monitor electrolytes    5/1: Continue with hydration.    5/2:  D5 half-normal saline.  Continue with aggressive fluids.    Improved.     Needs feeding by hand.       Prerenal azotemia    Hydrate    Oncology  History of prostate cancer    Treated with prostatectomy in 2004  Rechecked PSA    Endocrine  Failure to thrive in adult    Encourage oral intake  Treat constipation and remove fecal impaction  Family plans to honor patient wishes not to have PEG tube  Told wife to encourage oral intake throughout the day  Continue with current care    GI  Fecal impaction in rectum    Treat constipation room remove fecal impaction  Maybe this will help increase appetite    No bowel movement will need to add enema    5/2:  Add an enema to MiraLax and other laxatives.    Continue stool softners and enema.         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    Service: Nursing Home  Contact information:  [address] [facility] [address]  [phone]                                 Patient Instructions:   No discharge procedures on file.    Significant Diagnostic Studies: Labs: BMP:   Recent Labs   Lab 05/02/24  0944   GLU 89   *   K 4.2   *   CO2 25   BUN 46*   CREATININE 2.05*   CALCIUM 8.8    and CBC   Recent Labs   Lab 05/02/24  0944   WBC 8.00   HGB 11.9*   HCT 40.0          Pending Diagnostic Studies:       None           Medications:  Reconciled Home Medications:      Medication List        CONTINUE taking these medications      acetaminophen 500 MG tablet  Commonly known as: TYLENOL  Take 1,000 mg by mouth every 6 (six) hours as needed for Pain or Temperature greater than.     aspirin 81 MG EC tablet  Commonly known as: ECOTRIN  Take 1 tablet by mouth once daily.     clopidogreL 75 mg tablet  Commonly known as: PLAVIX  Take 75 mg by mouth once daily.     latanoprost 0.005 % ophthalmic solution  Place 1 drop into both eyes every evening.     levETIRAcetam 500 MG Tab  Commonly known as: KEPPRA  Take 500 mg by mouth 2 (two) times daily.     mirtazapine 15 MG tablet  Commonly known as: REMERON  Take 15 mg by mouth every evening.     pantoprazole 40 mg suspension  Commonly known as: PROTONIX  Take 40 mg by mouth once daily.     pravastatin 80 MG tablet  Commonly known as: PRAVACHOL  Take 80 mg by mouth every evening.              Indwelling Lines/Drains at time of discharge:   Lines/Drains/Airways       None                   Time spent on the discharge of patient: 45 minutes          Marcus Martinez MD  Department of Hospital Medicine  [facility] - [address] [place] [address]

## 2024-05-03 NOTE — NURSING
5/2/24 @ 2200: attempt made to digitally disimpact patient. Soft, minimal stool noted. MD made aware.

## 2024-05-03 NOTE — PLAN OF CARE
SS delivered packet to ASHLEY Rodriguez and notified her that pt will need [employer] transportation and [facility] has 5pm cut-off time. SS following

## 2024-05-03 NOTE — NURSING
Report called to Summer at nursing home. Notified [facility] that pt was ready for pickup. IV discontinued as ordered.

## 2024-05-27 ENCOUNTER — HOSPITAL ENCOUNTER (INPATIENT)
Facility: HOSPITAL | Age: 84
LOS: 9 days | Discharge: HOSPICE | DRG: 280 | End: 2024-06-05
Attending: EMERGENCY MEDICINE | Admitting: STUDENT IN AN ORGANIZED HEALTH CARE EDUCATION/TRAINING PROGRAM
Payer: MEDICARE

## 2024-05-27 DIAGNOSIS — R13.12 OROPHARYNGEAL DYSPHAGIA: ICD-10-CM

## 2024-05-27 DIAGNOSIS — I48.91 A-FIB: ICD-10-CM

## 2024-05-27 DIAGNOSIS — I50.9 ACUTE HEART FAILURE, UNSPECIFIED HEART FAILURE TYPE: ICD-10-CM

## 2024-05-27 DIAGNOSIS — I16.1 HYPERTENSIVE EMERGENCY: ICD-10-CM

## 2024-05-27 DIAGNOSIS — I50.9 ACUTE CONGESTIVE HEART FAILURE, UNSPECIFIED HEART FAILURE TYPE: ICD-10-CM

## 2024-05-27 DIAGNOSIS — I48.91 ATRIAL FIBRILLATION WITH RAPID VENTRICULAR RESPONSE: ICD-10-CM

## 2024-05-27 DIAGNOSIS — N17.9 AKI (ACUTE KIDNEY INJURY): ICD-10-CM

## 2024-05-27 DIAGNOSIS — J96.01 ACUTE RESPIRATORY FAILURE WITH HYPOXIA: ICD-10-CM

## 2024-05-27 DIAGNOSIS — F01.B0 MODERATE VASCULAR DEMENTIA WITHOUT BEHAVIORAL DISTURBANCE, PSYCHOTIC DISTURBANCE, MOOD DISTURBANCE, OR ANXIETY: ICD-10-CM

## 2024-05-27 DIAGNOSIS — I34.0 SEVERE MITRAL REGURGITATION: ICD-10-CM

## 2024-05-27 DIAGNOSIS — I50.33 ACUTE ON CHRONIC DIASTOLIC HEART FAILURE: ICD-10-CM

## 2024-05-27 DIAGNOSIS — R00.0 TACHYCARDIA: ICD-10-CM

## 2024-05-27 DIAGNOSIS — R79.89 ELEVATED TROPONIN: ICD-10-CM

## 2024-05-27 DIAGNOSIS — J96.90 RESPIRATORY FAILURE, UNSPECIFIED CHRONICITY, UNSPECIFIED WHETHER WITH HYPOXIA OR HYPERCAPNIA: Primary | ICD-10-CM

## 2024-05-27 DIAGNOSIS — I10 MALIGNANT HYPERTENSION: ICD-10-CM

## 2024-05-27 DIAGNOSIS — I16.9 HYPERTENSIVE CRISIS: ICD-10-CM

## 2024-05-27 DIAGNOSIS — E87.0 HYPERNATREMIA: ICD-10-CM

## 2024-05-27 DIAGNOSIS — G40.909 SEIZURE DISORDER: ICD-10-CM

## 2024-05-27 DIAGNOSIS — E87.5 HYPERKALEMIA: ICD-10-CM

## 2024-05-27 PROBLEM — R06.03 ACUTE RESPIRATORY DISTRESS: Status: ACTIVE | Noted: 2024-05-27

## 2024-05-27 LAB
ALBUMIN SERPL BCP-MCNC: 2.4 G/DL (ref 3.5–5)
ALBUMIN/GLOB SERPL: 0.6 {RATIO}
ALP SERPL-CCNC: 92 U/L (ref 45–115)
ALT SERPL W P-5'-P-CCNC: 81 U/L (ref 16–61)
ANION GAP SERPL CALCULATED.3IONS-SCNC: 10 MMOL/L (ref 7–16)
ANION GAP SERPL CALCULATED.3IONS-SCNC: 9 MMOL/L (ref 7–16)
APTT PPP: 44.5 SECONDS (ref 25.2–37.3)
AST SERPL W P-5'-P-CCNC: 50 U/L (ref 15–37)
BACTERIA #/AREA URNS HPF: ABNORMAL /HPF
BASOPHILS # BLD AUTO: 0.02 K/UL (ref 0–0.2)
BASOPHILS # BLD AUTO: 0.02 K/UL (ref 0–0.2)
BASOPHILS NFR BLD AUTO: 0.1 % (ref 0–1)
BASOPHILS NFR BLD AUTO: 0.2 % (ref 0–1)
BILIRUB SERPL-MCNC: 0.5 MG/DL (ref ?–1.2)
BILIRUB UR QL STRIP: NEGATIVE
BUN SERPL-MCNC: 57 MG/DL (ref 7–18)
BUN SERPL-MCNC: 57 MG/DL (ref 7–18)
BUN/CREAT SERPL: 29 (ref 6–20)
BUN/CREAT SERPL: 31 (ref 6–20)
CALCIUM SERPL-MCNC: 8.4 MG/DL (ref 8.5–10.1)
CALCIUM SERPL-MCNC: 8.4 MG/DL (ref 8.5–10.1)
CHLORIDE SERPL-SCNC: 122 MMOL/L (ref 98–107)
CHLORIDE SERPL-SCNC: 123 MMOL/L (ref 98–107)
CLARITY UR: CLEAR
CO2 SERPL-SCNC: 18 MMOL/L (ref 21–32)
CO2 SERPL-SCNC: 21 MMOL/L (ref 21–32)
COLOR UR: COLORLESS
CREAT SERPL-MCNC: 1.82 MG/DL (ref 0.7–1.3)
CREAT SERPL-MCNC: 1.98 MG/DL (ref 0.7–1.3)
D DIMER PPP FEU-MCNC: >20 ΜG/ML (ref 0–0.47)
DIFFERENTIAL METHOD BLD: ABNORMAL
DIFFERENTIAL METHOD BLD: ABNORMAL
EGFR (NO RACE VARIABLE) (RUSH/TITUS): 33 ML/MIN/1.73M2
EGFR (NO RACE VARIABLE) (RUSH/TITUS): 37 ML/MIN/1.73M2
EOSINOPHIL # BLD AUTO: 0.07 K/UL (ref 0–0.5)
EOSINOPHIL # BLD AUTO: 0.09 K/UL (ref 0–0.5)
EOSINOPHIL NFR BLD AUTO: 0.4 % (ref 1–4)
EOSINOPHIL NFR BLD AUTO: 0.8 % (ref 1–4)
ERYTHROCYTE [DISTWIDTH] IN BLOOD BY AUTOMATED COUNT: 17.7 % (ref 11.5–14.5)
ERYTHROCYTE [DISTWIDTH] IN BLOOD BY AUTOMATED COUNT: 18.5 % (ref 11.5–14.5)
GLOBULIN SER-MCNC: 4.2 G/DL (ref 2–4)
GLUCOSE SERPL-MCNC: 73 MG/DL (ref 70–105)
GLUCOSE SERPL-MCNC: 89 MG/DL (ref 70–105)
GLUCOSE SERPL-MCNC: 90 MG/DL (ref 74–106)
GLUCOSE SERPL-MCNC: 94 MG/DL (ref 74–106)
GLUCOSE UR STRIP-MCNC: NORMAL MG/DL
HCO3 UR-SCNC: 21.6 MMOL/L (ref 24–28)
HCT VFR BLD AUTO: 37.6 % (ref 40–54)
HCT VFR BLD AUTO: 43.8 % (ref 40–54)
HCT VFR BLD CALC: 39 % (ref 35–51)
HGB BLD-MCNC: 11.2 G/DL (ref 13.5–18)
HGB BLD-MCNC: 12.3 G/DL (ref 13.5–18)
IMM GRANULOCYTES # BLD AUTO: 0.08 K/UL (ref 0–0.04)
IMM GRANULOCYTES # BLD AUTO: 0.1 K/UL (ref 0–0.04)
IMM GRANULOCYTES NFR BLD: 0.6 % (ref 0–0.4)
IMM GRANULOCYTES NFR BLD: 0.8 % (ref 0–0.4)
INR BLD: 1.2
KETONES UR STRIP-SCNC: NEGATIVE MG/DL
LACTATE SERPL-SCNC: 2.1 MMOL/L (ref 0.4–2)
LACTATE SERPL-SCNC: 2.2 MMOL/L (ref 0.4–2)
LACTATE SERPL-SCNC: 2.4 MMOL/L (ref 0.4–2)
LDH SERPL L TO P-CCNC: 1.5 MMOL/L (ref 0.3–1.2)
LEUKOCYTE ESTERASE UR QL STRIP: NEGATIVE
LYMPHOCYTES # BLD AUTO: 1.25 K/UL (ref 1–4.8)
LYMPHOCYTES # BLD AUTO: 1.26 K/UL (ref 1–4.8)
LYMPHOCYTES NFR BLD AUTO: 11.8 % (ref 27–41)
LYMPHOCYTES NFR BLD AUTO: 7.8 % (ref 27–41)
MAGNESIUM SERPL-MCNC: 2.7 MG/DL (ref 1.7–2.3)
MCH RBC QN AUTO: 27.6 PG (ref 27–31)
MCH RBC QN AUTO: 27.8 PG (ref 27–31)
MCHC RBC AUTO-ENTMCNC: 28.1 G/DL (ref 32–36)
MCHC RBC AUTO-ENTMCNC: 29.8 G/DL (ref 32–36)
MCV RBC AUTO: 93.3 FL (ref 80–96)
MCV RBC AUTO: 98.4 FL (ref 80–96)
MONOCYTES # BLD AUTO: 0.56 K/UL (ref 0–0.8)
MONOCYTES # BLD AUTO: 0.92 K/UL (ref 0–0.8)
MONOCYTES NFR BLD AUTO: 5.3 % (ref 2–6)
MONOCYTES NFR BLD AUTO: 5.7 % (ref 2–6)
MPC BLD CALC-MCNC: 13.4 FL (ref 9.4–12.4)
MPC BLD CALC-MCNC: 13.5 FL (ref 9.4–12.4)
MUCOUS, UA: ABNORMAL /LPF
NEUTROPHILS # BLD AUTO: 13.7 K/UL (ref 1.8–7.7)
NEUTROPHILS # BLD AUTO: 8.63 K/UL (ref 1.8–7.7)
NEUTROPHILS NFR BLD AUTO: 81.1 % (ref 53–65)
NEUTROPHILS NFR BLD AUTO: 85.4 % (ref 53–65)
NITRITE UR QL STRIP: NEGATIVE
NRBC # BLD AUTO: 0 X10E3/UL
NRBC # BLD AUTO: 0.02 X10E3/UL
NRBC, AUTO (.00): 0 %
NRBC, AUTO (.00): 0.2 %
NT-PROBNP SERPL-MCNC: ABNORMAL PG/ML (ref 1–450)
PCO2 BLDA: 42 MMHG (ref 41–51)
PH SMN: 7.32 [PH] (ref 7.32–7.42)
PH UR STRIP: 5 PH UNITS
PLATELET # BLD AUTO: 170 K/UL (ref 150–400)
PLATELET # BLD AUTO: 176 K/UL (ref 150–400)
PLATELET MORPHOLOGY: ABNORMAL
PO2 BLDA: 20 MMHG (ref 25–40)
POC BASE EXCESS: -4.3 MMOL/L (ref -2–3)
POC CO2: 22.9 MMOL/L
POC IONIZED CALCIUM: 1.18 MMOL/L (ref 1.15–1.35)
POC SATURATED O2: 27 % (ref 40–70)
POCT GLUCOSE: 86 MG/DL (ref 60–95)
POTASSIUM BLD-SCNC: 5.7 MMOL/L (ref 3.4–4.5)
POTASSIUM SERPL-SCNC: 5 MMOL/L (ref 3.5–5.1)
POTASSIUM SERPL-SCNC: 5.4 MMOL/L (ref 3.5–5.1)
PROT SERPL-MCNC: 6.6 G/DL (ref 6.4–8.2)
PROT UR QL STRIP: 20
PROTHROMBIN TIME: 15.1 SECONDS (ref 11.7–14.7)
RBC # BLD AUTO: 4.03 M/UL (ref 4.6–6.2)
RBC # BLD AUTO: 4.45 M/UL (ref 4.6–6.2)
RBC # UR STRIP: ABNORMAL /UL
RBC #/AREA URNS HPF: 3 /HPF
RBC MORPH BLD: NORMAL
SODIUM BLD-SCNC: 142 MMOL/L (ref 136–145)
SODIUM SERPL-SCNC: 145 MMOL/L (ref 136–145)
SODIUM SERPL-SCNC: 148 MMOL/L (ref 136–145)
SP GR UR STRIP: 1.01
SQUAMOUS #/AREA URNS LPF: ABNORMAL /HPF
TROPONIN I SERPL DL<=0.01 NG/ML-MCNC: 319.1 PG/ML
TROPONIN I SERPL DL<=0.01 NG/ML-MCNC: 378.9 PG/ML
UROBILINOGEN UR STRIP-ACNC: NORMAL MG/DL
WBC # BLD AUTO: 10.63 K/UL (ref 4.5–11)
WBC # BLD AUTO: 16.07 K/UL (ref 4.5–11)
WBC #/AREA URNS HPF: 2 /HPF

## 2024-05-27 PROCEDURE — 85379 FIBRIN DEGRADATION QUANT: CPT | Performed by: EMERGENCY MEDICINE

## 2024-05-27 PROCEDURE — 51702 INSERT TEMP BLADDER CATH: CPT

## 2024-05-27 PROCEDURE — 99291 CRITICAL CARE FIRST HOUR: CPT

## 2024-05-27 PROCEDURE — 93005 ELECTROCARDIOGRAM TRACING: CPT

## 2024-05-27 PROCEDURE — 83880 ASSAY OF NATRIURETIC PEPTIDE: CPT | Performed by: EMERGENCY MEDICINE

## 2024-05-27 PROCEDURE — 87040 BLOOD CULTURE FOR BACTERIA: CPT | Performed by: STUDENT IN AN ORGANIZED HEALTH CARE EDUCATION/TRAINING PROGRAM

## 2024-05-27 PROCEDURE — 85730 THROMBOPLASTIN TIME PARTIAL: CPT | Performed by: INTERNAL MEDICINE

## 2024-05-27 PROCEDURE — 27000221 HC OXYGEN, UP TO 24 HOURS

## 2024-05-27 PROCEDURE — 94640 AIRWAY INHALATION TREATMENT: CPT

## 2024-05-27 PROCEDURE — 82330 ASSAY OF CALCIUM: CPT

## 2024-05-27 PROCEDURE — 85610 PROTHROMBIN TIME: CPT | Performed by: INTERNAL MEDICINE

## 2024-05-27 PROCEDURE — 83605 ASSAY OF LACTIC ACID: CPT

## 2024-05-27 PROCEDURE — 93010 ELECTROCARDIOGRAM REPORT: CPT | Mod: 77,,, | Performed by: INTERNAL MEDICINE

## 2024-05-27 PROCEDURE — 82962 GLUCOSE BLOOD TEST: CPT

## 2024-05-27 PROCEDURE — 25000242 PHARM REV CODE 250 ALT 637 W/ HCPCS: Performed by: STUDENT IN AN ORGANIZED HEALTH CARE EDUCATION/TRAINING PROGRAM

## 2024-05-27 PROCEDURE — 85025 COMPLETE CBC W/AUTO DIFF WBC: CPT | Performed by: INTERNAL MEDICINE

## 2024-05-27 PROCEDURE — 27000190 HC CPAP FULL FACE MASK W/VALVE

## 2024-05-27 PROCEDURE — 85014 HEMATOCRIT: CPT

## 2024-05-27 PROCEDURE — 80048 BASIC METABOLIC PNL TOTAL CA: CPT | Mod: XB | Performed by: STUDENT IN AN ORGANIZED HEALTH CARE EDUCATION/TRAINING PROGRAM

## 2024-05-27 PROCEDURE — 63600175 PHARM REV CODE 636 W HCPCS: Performed by: EMERGENCY MEDICINE

## 2024-05-27 PROCEDURE — 85025 COMPLETE CBC W/AUTO DIFF WBC: CPT | Performed by: EMERGENCY MEDICINE

## 2024-05-27 PROCEDURE — 63600175 PHARM REV CODE 636 W HCPCS: Performed by: STUDENT IN AN ORGANIZED HEALTH CARE EDUCATION/TRAINING PROGRAM

## 2024-05-27 PROCEDURE — 63600175 PHARM REV CODE 636 W HCPCS: Mod: JZ,JG | Performed by: STUDENT IN AN ORGANIZED HEALTH CARE EDUCATION/TRAINING PROGRAM

## 2024-05-27 PROCEDURE — 81001 URINALYSIS AUTO W/SCOPE: CPT | Performed by: STUDENT IN AN ORGANIZED HEALTH CARE EDUCATION/TRAINING PROGRAM

## 2024-05-27 PROCEDURE — 20000000 HC ICU ROOM

## 2024-05-27 PROCEDURE — 25000242 PHARM REV CODE 250 ALT 637 W/ HCPCS: Performed by: EMERGENCY MEDICINE

## 2024-05-27 PROCEDURE — 25000003 PHARM REV CODE 250: Performed by: STUDENT IN AN ORGANIZED HEALTH CARE EDUCATION/TRAINING PROGRAM

## 2024-05-27 PROCEDURE — 82803 BLOOD GASES ANY COMBINATION: CPT

## 2024-05-27 PROCEDURE — 84295 ASSAY OF SERUM SODIUM: CPT

## 2024-05-27 PROCEDURE — 96360 HYDRATION IV INFUSION INIT: CPT | Mod: 59

## 2024-05-27 PROCEDURE — 83605 ASSAY OF LACTIC ACID: CPT | Performed by: STUDENT IN AN ORGANIZED HEALTH CARE EDUCATION/TRAINING PROGRAM

## 2024-05-27 PROCEDURE — 63600175 PHARM REV CODE 636 W HCPCS: Performed by: INTERNAL MEDICINE

## 2024-05-27 PROCEDURE — 25000003 PHARM REV CODE 250: Performed by: EMERGENCY MEDICINE

## 2024-05-27 PROCEDURE — 94660 CPAP INITIATION&MGMT: CPT

## 2024-05-27 PROCEDURE — 99900035 HC TECH TIME PER 15 MIN (STAT)

## 2024-05-27 PROCEDURE — 96365 THER/PROPH/DIAG IV INF INIT: CPT

## 2024-05-27 PROCEDURE — 83735 ASSAY OF MAGNESIUM: CPT | Performed by: EMERGENCY MEDICINE

## 2024-05-27 PROCEDURE — 82947 ASSAY GLUCOSE BLOOD QUANT: CPT

## 2024-05-27 PROCEDURE — 5A09457 ASSISTANCE WITH RESPIRATORY VENTILATION, 24-96 CONSECUTIVE HOURS, CONTINUOUS POSITIVE AIRWAY PRESSURE: ICD-10-PCS | Performed by: STUDENT IN AN ORGANIZED HEALTH CARE EDUCATION/TRAINING PROGRAM

## 2024-05-27 PROCEDURE — 94761 N-INVAS EAR/PLS OXIMETRY MLT: CPT

## 2024-05-27 PROCEDURE — 36415 COLL VENOUS BLD VENIPUNCTURE: CPT | Performed by: EMERGENCY MEDICINE

## 2024-05-27 PROCEDURE — 93010 ELECTROCARDIOGRAM REPORT: CPT | Mod: ,,, | Performed by: INTERNAL MEDICINE

## 2024-05-27 PROCEDURE — 84484 ASSAY OF TROPONIN QUANT: CPT | Performed by: EMERGENCY MEDICINE

## 2024-05-27 PROCEDURE — 80053 COMPREHEN METABOLIC PANEL: CPT | Performed by: EMERGENCY MEDICINE

## 2024-05-27 PROCEDURE — 99900031 HC PATIENT EDUCATION (STAT)

## 2024-05-27 PROCEDURE — 99291 CRITICAL CARE FIRST HOUR: CPT | Mod: ,,, | Performed by: STUDENT IN AN ORGANIZED HEALTH CARE EDUCATION/TRAINING PROGRAM

## 2024-05-27 PROCEDURE — 96361 HYDRATE IV INFUSION ADD-ON: CPT

## 2024-05-27 PROCEDURE — 96367 TX/PROPH/DG ADDL SEQ IV INF: CPT

## 2024-05-27 PROCEDURE — 84132 ASSAY OF SERUM POTASSIUM: CPT

## 2024-05-27 PROCEDURE — 84484 ASSAY OF TROPONIN QUANT: CPT | Performed by: STUDENT IN AN ORGANIZED HEALTH CARE EDUCATION/TRAINING PROGRAM

## 2024-05-27 RX ORDER — IPRATROPIUM BROMIDE AND ALBUTEROL SULFATE 2.5; .5 MG/3ML; MG/3ML
3 SOLUTION RESPIRATORY (INHALATION) EVERY 8 HOURS
Status: DISCONTINUED | OUTPATIENT
Start: 2024-05-27 | End: 2024-06-05 | Stop reason: HOSPADM

## 2024-05-27 RX ORDER — LEVETIRACETAM 500 MG/5ML
500 INJECTION, SOLUTION, CONCENTRATE INTRAVENOUS EVERY 12 HOURS
Status: DISCONTINUED | OUTPATIENT
Start: 2024-05-27 | End: 2024-05-28

## 2024-05-27 RX ORDER — LEVETIRACETAM 500 MG/1
500 TABLET ORAL 2 TIMES DAILY
Status: DISCONTINUED | OUTPATIENT
Start: 2024-05-27 | End: 2024-05-27

## 2024-05-27 RX ORDER — HEPARIN SODIUM 5000 [USP'U]/ML
5000 INJECTION, SOLUTION INTRAVENOUS; SUBCUTANEOUS EVERY 8 HOURS
Status: DISCONTINUED | OUTPATIENT
Start: 2024-05-27 | End: 2024-05-27

## 2024-05-27 RX ORDER — SODIUM CHLORIDE FOR INHALATION 3 %
4 VIAL, NEBULIZER (ML) INHALATION ONCE
Status: COMPLETED | OUTPATIENT
Start: 2024-05-27 | End: 2024-05-27

## 2024-05-27 RX ORDER — MIRTAZAPINE 15 MG/1
15 TABLET, FILM COATED ORAL NIGHTLY
Status: DISCONTINUED | OUTPATIENT
Start: 2024-05-27 | End: 2024-06-05 | Stop reason: HOSPADM

## 2024-05-27 RX ORDER — ACETAMINOPHEN 325 MG/1
650 TABLET ORAL EVERY 6 HOURS PRN
Status: DISCONTINUED | OUTPATIENT
Start: 2024-05-27 | End: 2024-06-05 | Stop reason: HOSPADM

## 2024-05-27 RX ORDER — MUPIROCIN 20 MG/G
OINTMENT TOPICAL 2 TIMES DAILY
Status: COMPLETED | OUTPATIENT
Start: 2024-05-27 | End: 2024-06-01

## 2024-05-27 RX ORDER — GLUCAGON 1 MG
1 KIT INJECTION
Status: DISCONTINUED | OUTPATIENT
Start: 2024-05-27 | End: 2024-06-05 | Stop reason: HOSPADM

## 2024-05-27 RX ORDER — ASPIRIN 81 MG/1
81 TABLET ORAL DAILY
Status: DISCONTINUED | OUTPATIENT
Start: 2024-05-27 | End: 2024-05-28

## 2024-05-27 RX ORDER — PANTOPRAZOLE SODIUM 40 MG/1
40 FOR SUSPENSION ORAL DAILY
Status: DISCONTINUED | OUTPATIENT
Start: 2024-05-27 | End: 2024-05-27

## 2024-05-27 RX ORDER — PANTOPRAZOLE SODIUM 40 MG/10ML
40 INJECTION, POWDER, LYOPHILIZED, FOR SOLUTION INTRAVENOUS DAILY
Status: DISCONTINUED | OUTPATIENT
Start: 2024-05-28 | End: 2024-05-29

## 2024-05-27 RX ORDER — NITROGLYCERIN 20 MG/100ML
0-400 INJECTION INTRAVENOUS CONTINUOUS
Status: DISCONTINUED | OUTPATIENT
Start: 2024-05-27 | End: 2024-05-29

## 2024-05-27 RX ORDER — SENNOSIDES 8.6 MG/1
8.6 TABLET ORAL DAILY PRN
Status: DISCONTINUED | OUTPATIENT
Start: 2024-05-27 | End: 2024-06-05 | Stop reason: HOSPADM

## 2024-05-27 RX ORDER — HEPARIN SODIUM,PORCINE/D5W 25000/250
0-40 INTRAVENOUS SOLUTION INTRAVENOUS CONTINUOUS
Status: DISCONTINUED | OUTPATIENT
Start: 2024-05-27 | End: 2024-05-28

## 2024-05-27 RX ORDER — IPRATROPIUM BROMIDE AND ALBUTEROL SULFATE 2.5; .5 MG/3ML; MG/3ML
3 SOLUTION RESPIRATORY (INHALATION) EVERY 6 HOURS PRN
COMMUNITY
Start: 2024-05-25

## 2024-05-27 RX ORDER — ONDANSETRON HYDROCHLORIDE 2 MG/ML
4 INJECTION, SOLUTION INTRAVENOUS EVERY 8 HOURS PRN
Status: DISCONTINUED | OUTPATIENT
Start: 2024-05-27 | End: 2024-06-05 | Stop reason: HOSPADM

## 2024-05-27 RX ORDER — PRAVASTATIN SODIUM 40 MG/1
80 TABLET ORAL NIGHTLY
Status: DISCONTINUED | OUTPATIENT
Start: 2024-05-27 | End: 2024-06-04

## 2024-05-27 RX ORDER — MEGESTROL ACETATE 40 MG/ML
5 SUSPENSION ORAL DAILY
COMMUNITY
Start: 2024-05-06

## 2024-05-27 RX ORDER — FUROSEMIDE 10 MG/ML
60 INJECTION INTRAMUSCULAR; INTRAVENOUS ONCE
Status: COMPLETED | OUTPATIENT
Start: 2024-05-27 | End: 2024-05-27

## 2024-05-27 RX ORDER — SODIUM CHLORIDE 0.9 % (FLUSH) 0.9 %
10 SYRINGE (ML) INJECTION
Status: DISCONTINUED | OUTPATIENT
Start: 2024-05-27 | End: 2024-06-05 | Stop reason: HOSPADM

## 2024-05-27 RX ORDER — CLOPIDOGREL BISULFATE 75 MG/1
75 TABLET ORAL DAILY
Status: DISCONTINUED | OUTPATIENT
Start: 2024-05-27 | End: 2024-06-05 | Stop reason: HOSPADM

## 2024-05-27 RX ADMIN — MUPIROCIN: 20 OINTMENT TOPICAL at 08:05

## 2024-05-27 RX ADMIN — SODIUM CHLORIDE 1000 ML: 9 INJECTION, SOLUTION INTRAVENOUS at 09:05

## 2024-05-27 RX ADMIN — SODIUM CHLORIDE SOLN NEBU 3% 4 ML: 3 NEBU SOLN at 10:05

## 2024-05-27 RX ADMIN — NITROGLYCERIN 5 MCG/MIN: 20 INJECTION INTRAVENOUS at 02:05

## 2024-05-27 RX ADMIN — VANCOMYCIN HYDROCHLORIDE 1000 MG: 1 INJECTION, POWDER, LYOPHILIZED, FOR SOLUTION INTRAVENOUS at 11:05

## 2024-05-27 RX ADMIN — PIPERACILLIN AND TAZOBACTAM 4.5 G: 4; .5 INJECTION, POWDER, FOR SOLUTION INTRAVENOUS; PARENTERAL at 10:05

## 2024-05-27 RX ADMIN — LEVETIRACETAM 500 MG: 100 INJECTION, SOLUTION INTRAVENOUS at 08:05

## 2024-05-27 RX ADMIN — HEPARIN SODIUM 5000 UNITS: 5000 INJECTION, SOLUTION INTRAVENOUS; SUBCUTANEOUS at 09:05

## 2024-05-27 RX ADMIN — AMIODARONE HYDROCHLORIDE 150 MG: 1.5 INJECTION, SOLUTION INTRAVENOUS at 10:05

## 2024-05-27 RX ADMIN — IPRATROPIUM BROMIDE AND ALBUTEROL SULFATE 3 ML: 2.5; .5 SOLUTION RESPIRATORY (INHALATION) at 04:05

## 2024-05-27 RX ADMIN — HEPARIN SODIUM 18 UNITS/KG/HR: 10000 INJECTION, SOLUTION INTRAVENOUS at 11:05

## 2024-05-27 RX ADMIN — FUROSEMIDE 60 MG: 10 INJECTION, SOLUTION INTRAMUSCULAR; INTRAVENOUS at 02:05

## 2024-05-27 RX ADMIN — AMIODARONE HYDROCHLORIDE 1 MG/MIN: 1.8 INJECTION, SOLUTION INTRAVENOUS at 10:05

## 2024-05-27 NOTE — ED PROVIDER NOTES
Encounter Date: 5/27/2024       History     Chief Complaint   Patient presents with    Shortness of Breath     Patient came by ambulance from local nursing home due to severe shortness of breath.  History of failure to thrive.  Patient was DNR.  Patient has a history of aspiration also.  No associated chest pain.  No fever      Review of patient's allergies indicates:  No Known Allergies  Past Medical History:   Diagnosis Date    Depression     Hemiplegia and hemiparesis following cerebral infarction affecting right dominant side     Hypertension     Seizures     Stroke     Vascular dementia      No past surgical history on file.  No family history on file.  Social History     Tobacco Use    Smoking status: Unknown   Substance Use Topics    Alcohol use: Not Currently    Drug use: Not Currently     Review of Systems   Constitutional:  Negative for fever.   HENT:  Negative for sore throat.    Respiratory:  Positive for cough, shortness of breath and wheezing.    Cardiovascular:  Positive for leg swelling. Negative for chest pain.   Gastrointestinal:  Negative for nausea.   Genitourinary:  Negative for dysuria.   Musculoskeletal:  Negative for back pain.   Skin:  Negative for rash.   Neurological:  Negative for weakness.   Hematological:  Does not bruise/bleed easily.       Physical Exam     Initial Vitals [05/27/24 0859]   BP Pulse Resp Temp SpO2   (!) 153/99 (!) 125 16 97.4 °F (36.3 °C) 99 %      MAP       --         Physical Exam    Nursing note and vitals reviewed.  Constitutional: He appears well-developed and well-nourished.   HENT:   Head: Normocephalic and atraumatic.   Eyes: EOM are normal. Pupils are equal, round, and reactive to light.   Neck: Neck supple. No thyromegaly present. No JVD present.   Normal range of motion.  Cardiovascular:  Normal rate, regular rhythm, normal heart sounds and intact distal pulses.           No murmur heard.  Pulmonary/Chest: Breath sounds normal. No stridor. No respiratory  distress. He has no wheezes.   Abdominal: Abdomen is soft. Bowel sounds are normal. He exhibits no distension. There is no abdominal tenderness.   Musculoskeletal:         General: Edema present. No tenderness. Normal range of motion.      Cervical back: Normal range of motion and neck supple.     Lymphadenopathy:     He has no cervical adenopathy.   Neurological: He is alert and oriented to person, place, and time. He has normal strength. No cranial nerve deficit or sensory deficit. GCS score is 15. GCS eye subscore is 4. GCS verbal subscore is 5. GCS motor subscore is 6.   Skin: Skin is warm and dry. Capillary refill takes less than 2 seconds. No rash noted.   Psychiatric: He has a normal mood and affect.         Medical Screening Exam   See Full Note    ED Course   Procedures  Labs Reviewed   COMPREHENSIVE METABOLIC PANEL - Abnormal; Notable for the following components:       Result Value    Potassium 5.4 (*)     Chloride 123 (*)     CO2 18 (*)     BUN 57 (*)     Creatinine 1.82 (*)     BUN/Creatinine Ratio 31 (*)     Calcium 8.4 (*)     Albumin 2.4 (*)     Globulin 4.2 (*)     ALT 81 (*)     AST 50 (*)     eGFR 37 (*)     All other components within normal limits   CBC WITH DIFFERENTIAL - Abnormal; Notable for the following components:    RBC 4.45 (*)     Hemoglobin 12.3 (*)     MCV 98.4 (*)     MCHC 28.1 (*)     RDW 18.5 (*)     MPV 13.4 (*)     Neutrophils % 81.1 (*)     Lymphocytes % 11.8 (*)     Eosinophils % 0.8 (*)     Immature Granulocytes % 0.8 (*)     nRBC, Auto 0.2 (*)     Neutrophils, Abs 8.63 (*)     Immature Granulocytes, Absolute 0.08 (*)     nRBC, Absolute 0.02 (*)     All other components within normal limits   MAGNESIUM - Abnormal; Notable for the following components:    Magnesium 2.7 (*)     All other components within normal limits   TROPONIN I - Abnormal; Notable for the following components:    Troponin I High Sensitivity 378.9 (*)     All other components within normal limits   NT-PRO  NATRIURETIC PEPTIDE - Abnormal; Notable for the following components:    ProBNP 11,016 (*)     All other components within normal limits   CBC MORPHOLOGY - Abnormal; Notable for the following components:    Platelet Morphology Few Large Platelets (*)     All other components within normal limits   CULTURE, BLOOD   CULTURE, BLOOD   CBC W/ AUTO DIFFERENTIAL    Narrative:     The following orders were created for panel order CBC auto differential.  Procedure                               Abnormality         Status                     ---------                               -----------         ------                     CBC with Differential[[id number]]       Abnormal            Final result                 Please view results for these tests on the individual orders.   URINALYSIS, REFLEX TO URINE CULTURE   LACTIC ACID, PLASMA   D DIMER, QUANTITATIVE   TROPONIN I          Imaging Results              X-Ray Chest AP Portable (Final result)  Result time 05/27/24 09:02:20      Final result by Tico Dela Cruz MD (05/27/24 09:02:20)                   Impression:      Cardiomegaly and suspected CHF changes.  Infiltrates could have a similar appearance.      Electronically signed by: Tico Dela Cruz  Date:    05/27/2024  Time:    09:02               Narrative:    EXAMINATION:  XR CHEST AP PORTABLE    CLINICAL HISTORY:  Sepsis;    TECHNIQUE:  Single frontal view of the chest was performed.    COMPARISON:  04/30/2024    FINDINGS:  The cardiac silhouette is mildly enlarged.  Bilateral interstitial opacities are seen.  No pneumothorax.                                       Medications   furosemide injection 60 mg (has no administration in time range)   nitroGLYCERIN in 5 % dextrose 50 mg/250 mL (200 mcg/mL) infusion (has no administration in time range)   piperacillin-tazobactam (ZOSYN) 4.5 g in dextrose 5 % in water (D5W) 100 mL IVPB (MB+) (0 g Intravenous Stopped 5/27/24 1132)   sodium chloride 0.9% bolus 1,000 mL 1,000 mL (0 mLs  Intravenous Stopped 5/27/24 1135)   sodium chloride 3% nebulizer solution 4 mL (4 mLs Nebulization Given 5/27/24 1017)     Medical Decision Making  [facility]    Patient presents for emergent evaluation of acute shortness breath that poses a threat to life and/or bodily function.    In the ED patient found to have acute respiratory failure.  Required BiPAP.  Patient shallow respirations worsening labored breathing.  Tachycardic elevated blood pressure.  Treated empirically with antibiotics for suspected sepsis and aspiration.  Workup later came back showing no bandemia leukocytes normal..  ProBNP very elevated.  Troponin elevated.  I ordered labs and personally reviewed them.  Labs significant for elevated proBNP and troponin..    I ordered X-rays and personally reviewed them and reviewed the radiologist interpretation.  Xray significant for diffuse opacification bilateral.    I ordered EKG and personally reviewed it.  EKG significant for no ST elevation.      Admission [facility]  I discussed the patient presentation and findings with the consultant for intensivist (speciality).    Patient was managed in the ED with BiPAP.  Lasix.  Broad-spectrum antibiotics  The response to treatment was stable.    Patient required emergent consultation to intensivist (admitting physician) for admission.     Amount and/or Complexity of Data Reviewed  Labs: ordered.  Radiology: ordered.    Risk  Prescription drug management.              Attending Attestation:         Attending Critical Care:   Critical Care Times:   Direct Patient Care (initial evaluation, reassessments, and time considering the case)................................................................20 minutes.   Ordering, Reviewing, and Interpreting Diagnostic Studies...............................................................................................................5 minutes.    Documentation..................................................................................................................................................................................5 minutes.   Consultation with other Physicians. .................................................................................................................................................3 minutes.   ==============================================================  Total Critical Care Time - exclusive of procedural time: 33 minutes.  ==============================================================          ED Course as of 05/27/24 1308   Mon May 27, 2024   0905 Given 300 mL NS by EMS finished upon arrival.  [PK]   0913   Will start with 1 L of fluids and reassess.  It was thought that early goal-directed fluid therapy at 30 milliliters/kilogram could cause volume overload at this point .  Further workup pending [PK]   1304 Workup is looking more cardiac than sepsis.  Will change course and treat with Lasix. [PK]      ED Course User Index  [PK] Pb Fischer MD                           Clinical Impression:   Final diagnoses:  [R00.0] Tachycardia  [J96.90] Respiratory failure, unspecified chronicity, unspecified whether with hypoxia or hypercapnia (Primary)  [I50.9] Acute congestive heart failure, unspecified heart failure type  [I10] Malignant hypertension               Pb Fischer MD  05/27/24 [id number]

## 2024-05-27 NOTE — SUBJECTIVE & OBJECTIVE
Past Medical History:   Diagnosis Date    Depression     Hemiplegia and hemiparesis following cerebral infarction affecting right dominant side     Hypertension     Seizures     Stroke     Vascular dementia        No past surgical history on file.    Review of patient's allergies indicates:  No Known Allergies    Family History    None       Tobacco Use    Smoking status: Unknown    Smokeless tobacco: Not on file   Substance and Sexual Activity    Alcohol use: Not Currently    Drug use: Not Currently    Sexual activity: Not on file      Review of Systems  Objective:     Vital Signs (Most Recent):  Temp: 97.4 °F (36.3 °C) (05/27/24 0859)  Pulse: 95 (05/27/24 1437)  Resp: 11 (05/27/24 1437)  BP: (!) 161/97 (05/27/24 1437)  SpO2: 97 % (05/27/24 1437) Vital Signs (24h Range):  Temp:  [97.4 °F (36.3 °C)] 97.4 °F (36.3 °C)  Pulse:  [] 95  Resp:  [10-21] 11  SpO2:  [89 %-100 %] 97 %  BP: (146-200)/() 161/97   Weight: 70.8 kg (156 lb)  Body mass index is 23.04 kg/m².      Intake/Output Summary (Last 24 hours) at 5/27/2024 1603  Last data filed at 5/27/2024 1304  Gross per 24 hour   Intake 1314.89 ml   Output --   Net 1314.89 ml          Physical Exam  Constitutional:       General: He is in acute distress.      Appearance: He is ill-appearing.   HENT:      Head: Normocephalic and atraumatic.   Eyes:      General: No scleral icterus.  Cardiovascular:      Rate and Rhythm: Tachycardia present. Rhythm irregular.      Heart sounds: Normal heart sounds.   Pulmonary:      Effort: Pulmonary effort is normal.      Breath sounds: Wheezing present. No rhonchi or rales.      Comments: On BPAP  Abdominal:      Palpations: Abdomen is soft.      Tenderness: There is no abdominal tenderness. There is no guarding or rebound.   Musculoskeletal:      Right lower leg: Edema present.      Left lower leg: Edema present.      Comments: pitting   Skin:     General: Skin is warm.   Neurological:      Mental Status: He is alert. Mental  status is at baseline.            Vents:     Lines/Drains/Airways       Peripheral Intravenous Line  Duration                  Peripheral IV - Single Lumen 05/27/24 1012 20 G Anterior;Distal;Right Forearm <1 day         Peripheral IV - Single Lumen 05/27/24 20 G Left;Posterior Forearm <1 day                  Significant Labs:    CBC/Anemia Profile:  Recent Labs   Lab 05/27/24  1007 05/27/24  1011   WBC 10.63  --    HGB 12.3*  --    HCT 43.8 39     --    MCV 98.4*  --    RDW 18.5*  --         Chemistries:  Recent Labs   Lab 05/27/24  1110      K 5.4*   *   CO2 18*   BUN 57*   CREATININE 1.82*   CALCIUM 8.4*   ALBUMIN 2.4*   PROT 6.6   BILITOT 0.5   ALKPHOS 92   ALT 81*   AST 50*   MG 2.7*       All pertinent labs within the past 24 hours have been reviewed.    Significant Imaging: I have reviewed all pertinent imaging results/findings within the past 24 hours.

## 2024-05-27 NOTE — ASSESSMENT & PLAN NOTE
Presenting with pulmonary edema a/w respiratory distress. Markedly elevated BP with signs concerning for heart failure now found to have NTproBNP highest to date. Unclear cardiac history with no baseline TTE. EKG with Afib with RVR and Tn peaked on first draw  - cont nitroglycerin gtt for goal SBP <140  - start lasix IV with gtt  - Tn peaked on first draw  - TTE is notable for  LVEF 50-55%, mild RV enlargement, severe MR and PASP 18  - overall low suspicion for sepsis at this time. Will stop Abx. Monitor fever curve and f/u Cxs (BCx obtained, UA pending).

## 2024-05-27 NOTE — ED NOTES
at bedside to reevaluate patient. Order for bipap received - 15/5, 60% FiO2. Placed on bipap at this time and deep suction performed, no secretions at this time.

## 2024-05-27 NOTE — ED NOTES
"HR on cardiac monitor 150-190. When going into room to assess patient, nebulizer mask was off of patient's face and he was stating "help me". O2 sats 93% on O2, nebulizer placed back on patient and pulled up in bed.  called to reevaluate patient.  "

## 2024-05-27 NOTE — PROGRESS NOTES
[facility]  Critical Care Medicine  Progress Note    Patient Name: Claude Patrick  MRN: [id number]  Admission Date: 5/27/2024  Hospital Length of Stay: 0 days  Code Status: DNR  Attending Provider: Margie Medley MD  Primary Care Provider: Tita, Primary Doctor   Principal Problem: Hypertensive emergency    Subjective:     HPI:  82 yo M with CVA w/ expressive aphasia and residual R sided weakness, HTN, prostate cancer s/p prostatectomy (2004),  FTT, seizure disorder who resides at [facility] (since 10/2023) presented 05/27 with DASH found to be in respiratory distress with admission to ICU for management of hypertensive emergency.     History was obtained from records as patient is a poor historian at baseline.  Patient presented from his nursing home with acute respiratory distress.  There was concern for an aspiration event.  On presentation to the ED he was noted to be hypoxic with SpO2 93%   Associated with increased work of breathing.  A chest x-ray demonstrated hazy opacification of bilateral lungs. He was recently admitted 04/30-05/03 with hypernatremia 2/2 hypovolemia which was attributed to his decreased PO intake.   ED course with patient receiving vancomycin IV x1, Zosyn IV x1 and NS IV x1L. On my assessment, recommended diuresis with Lasix IV, NIV settings were changed and started him on a nitro glycerin drip.          Hospital/ICU Course:  No notes on file    Past Medical History:   Diagnosis Date    Depression     Hemiplegia and hemiparesis following cerebral infarction affecting right dominant side     Hypertension     Seizures     Stroke     Vascular dementia        No past surgical history on file.    Review of patient's allergies indicates:  No Known Allergies    Family History    None       Tobacco Use    Smoking status: Unknown    Smokeless tobacco: Not on file   Substance and Sexual Activity    Alcohol use: Not Currently    Drug use: Not Currently    Sexual activity: Not  on file      Review of Systems  Objective:     Vital Signs (Most Recent):  Temp: 97.4 °F (36.3 °C) (05/27/24 0859)  Pulse: 95 (05/27/24 1437)  Resp: 11 (05/27/24 1437)  BP: (!) 161/97 (05/27/24 1437)  SpO2: 97 % (05/27/24 1437) Vital Signs (24h Range):  Temp:  [97.4 °F (36.3 °C)] 97.4 °F (36.3 °C)  Pulse:  [] 95  Resp:  [10-21] 11  SpO2:  [89 %-100 %] 97 %  BP: (146-200)/() 161/97   Weight: 70.8 kg (156 lb)  Body mass index is 23.04 kg/m².      Intake/Output Summary (Last 24 hours) at 5/27/2024 1603  Last data filed at 5/27/2024 1304  Gross per 24 hour   Intake 1314.89 ml   Output --   Net 1314.89 ml          Physical Exam  Constitutional:       General: He is in acute distress.      Appearance: He is ill-appearing.   HENT:      Head: Normocephalic and atraumatic.   Eyes:      General: No scleral icterus.  Cardiovascular:      Rate and Rhythm: Tachycardia present. Rhythm irregular.      Heart sounds: Normal heart sounds.   Pulmonary:      Effort: Pulmonary effort is normal.      Breath sounds: Wheezing present. No rhonchi or rales.      Comments: On BPAP  Abdominal:      Palpations: Abdomen is soft.      Tenderness: There is no abdominal tenderness. There is no guarding or rebound.   Musculoskeletal:      Right lower leg: Edema present.      Left lower leg: Edema present.      Comments: pitting   Skin:     General: Skin is warm.   Neurological:      Mental Status: He is alert. Mental status is at baseline.            Vents:     Lines/Drains/Airways       Peripheral Intravenous Line  Duration                  Peripheral IV - Single Lumen 05/27/24 1012 20 G Anterior;Distal;Right Forearm <1 day         Peripheral IV - Single Lumen 05/27/24 20 G Left;Posterior Forearm <1 day                  Significant Labs:    CBC/Anemia Profile:  Recent Labs   Lab 05/27/24  1007 05/27/24  1011   WBC 10.63  --    HGB 12.3*  --    HCT 43.8 39     --    MCV 98.4*  --    RDW 18.5*  --         Chemistries:  Recent Labs    Lab 05/27/24  1110      K 5.4*   *   CO2 18*   BUN 57*   CREATININE 1.82*   CALCIUM 8.4*   ALBUMIN 2.4*   PROT 6.6   BILITOT 0.5   ALKPHOS 92   ALT 81*   AST 50*   MG 2.7*       All pertinent labs within the past 24 hours have been reviewed.    Significant Imaging: I have reviewed all pertinent imaging results/findings within the past 24 hours.      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  Does not ambulate at baseline.  On Plavix and aspirin.  Presenting in hypertensive emergency.  - unsuccessful attempt to call wife, will try again in evening  - obtain stat CT Head as part of w/u of hypertensive crisis  - cont ASA and Plavix    Pulmonary  Acute respiratory failure with hypoxia  CXR with diffuse interstitial opacities consistent with pulmonary edema. VBG reviewed with  mild metabolic acidosis and no hypercapnia.   Placed on BPAP with good volume.   - change BPAP settings to 15/8 and 30%, rise 1  - supplement for goal SpO2 >92%  - diuresis as per hypertensive emergency plan  - most likely a cardiac wheeze given imaging; will treat with scheduled DuoNebs x48 hrs    Cardiac/Vascular  * Hypertensive emergency  Presenting with pulmonary edema a/w respiratory distress. Markedly elevated BP with signs concerning for heart failure now found to have NTproBNP highest to date. Unclear cardiac history with no baseline TTE. Bedside echo with hyperdynamic LV and normal RV.   - start nitroglycerin gtt for goal SBP <140  - trend Tn, ordered STAT  - f/u on EKG, unavailable at time of assessment  - obtain TTE  - overall low suspicion for sepsis at this time. Will stop Abx. Monitor fever curve and f/u Cxs (BCx  obtained, UA pending).    Renal/  Hyperkalemia  Hyperkalemia with renal function at baseline (SCr 1.82 with prior baseline 2-2.7). EKG pending.   - repeat BMP in PM and then daily  - treated with IV lasix        Critical Care Medicine Daily Checklist:05/27/2024   F: Feeding NPO   A: Analgesia   N/A   S: Sedation N/A N/A           T: Thromboprophylaxis Lower extremity SCD and Heparin SQ   H: HOB > 300 Yes.   U: Stress Ulcer Prophylaxis N/A   G: Glucose Control Within goal   S: SAT & SBT Coordinated?  N/A   B: Bowel Regimen Bowel regimen ordered.   I: Indwelling Catheter Reviewed Maintain: N/A  Remove: N/A   D: De-escalation of Antimicrobials Yes    Disposition ICU       Critical Care Time: 60 minutes  Critical secondary to Patient has a condition that poses threat to life and bodily function: Severe Respiratory Distress      Critical care was time spent personally by me on the following activities: development of treatment plan with patient or surrogate and bedside caregivers, discussions with consultants, evaluation of patient's response to treatment, examination of patient, ordering and performing treatments and interventions, ordering and review of laboratory studies, ordering and review of radiographic studies, pulse oximetry, re-evaluation of patient's condition. This critical care time did not overlap with that of any other provider or involve time for any procedures.     Margie Medley MD  Critical Care Medicine  [facility] ICU

## 2024-05-27 NOTE — HPI
82 yo M with CVA w/ expressive aphasia and residual R sided weakness, HTN, prostate cancer s/p prostatectomy (2004),  FTT, seizure disorder who resides at [facility] (since 10/2023) presented 05/27 with shortness of breath found to be in respiratory distress with admission to ICU for management of hypertensive emergency.     History was obtained from records as patient is a poor historian at baseline.  Patient presented from his nursing home with acute respiratory distress.  There was concern for an aspiration event.  On presentation to the ED he was noted to be hypoxic with SpO2 93%   Associated with increased work of breathing.  A chest x-ray demonstrated hazy opacification of bilateral lungs. He was recently admitted 04/30-05/03 with hypernatremia 2/2 hypovolemia which was attributed to his decreased PO intake.   ED course with patient receiving vancomycin IV x1, Zosyn IV x1 and NS IV x1L. On my assessment, recommended diuresis with Lasix IV, NIV settings were changed and started him on a nitro glycerin drip.

## 2024-05-27 NOTE — ASSESSMENT & PLAN NOTE
CXR with diffuse interstitial opacities consistent with pulmonary edema. VBG reviewed with  mild metabolic acidosis and no hypercapnia.   Placed on BPAP with good volume.   - BPAP as needed for increased work of breathing  - supplement for goal SpO2 >92%  - diuresis as per hypertensive emergency plan  - most likely a cardiac wheeze given imaging; will treat with scheduled DuoNebs x48 hrs

## 2024-05-27 NOTE — ED TRIAGE NOTES
Pt presents to the ED via EMS from [facility] with c/o possible aspiration pneumonia. Pt has received multiple different types of antibiotics from May 13, 2024 to May 23, 2024.

## 2024-05-27 NOTE — ED NOTES
Attempted to cath pt for urine. Once with silicone and once with coude catheter, unsuccessful each time.

## 2024-05-27 NOTE — ASSESSMENT & PLAN NOTE
History of CVA (ischemic ) with residual expressive aphasia and right-sided weakness.  Does not ambulate at baseline.  On Plavix and aspirin.  Presenting in hypertensive emergency. CT head negative for acute intracranial process. Appears to be at baseline 05/28.  - wife updated 05/28  - cont ASA and Plavix    -- SLP evaluation completed

## 2024-05-27 NOTE — ASSESSMENT & PLAN NOTE
Hyperkalemia with renal function at baseline (SCr 1.82 with prior baseline 2-2.7). EKG pending.   - repeat BMP in PM and then daily  - treated with IV lasix

## 2024-05-27 NOTE — PHARMACY MED REC
"Admission Medication History     The home medication history was taken by Madeline Batres.    You may go to "Admission" then "Reconcile Home Medications" tabs to review and/or act upon these items.     The home medication list has been updated by the Pharmacy department.   Please read ALL comments highlighted in yellow.   Please address this information as you see fit.    Feel free to contact us if you have any questions or require assistance.    The following medications were added:  Duo-Neb 2.5 mg-0.5 mg/3 mL neb soln  Megestrol 400 mg/10 mL susp        Medications listed below were obtained from: Patient/family, [employer], and Nursing home  (Not in a hospital admission)        Current Outpatient Medications on File Prior to Encounter   Medication Sig Dispense Refill Last Dose    albuterol-ipratropium (DUO-NEB) 2.5 mg-0.5 mg/3 mL nebulizer solution Take 3 mLs by nebulization every 6 (six) hours as needed.   5/27/2024    aspirin (ECOTRIN) 81 MG EC tablet Take 1 tablet by mouth once daily.   5/26/2024    clopidogreL (PLAVIX) 75 mg tablet Take 75 mg by mouth once daily.   5/26/2024    latanoprost 0.005 % ophthalmic solution Place 1 drop into both eyes every evening.   5/26/2024    levETIRAcetam (KEPPRA) 500 MG Tab Take 500 mg by mouth 2 (two) times daily.   5/26/2024    megestroL (MEGACE) 400 mg/10 mL (40 mg/mL) Susp Take 5 mLs by mouth once daily.   5/26/2024    mirtazapine (REMERON) 15 MG tablet Take 15 mg by mouth every evening.   5/26/2024    pantoprazole (PROTONIX) 40 mg suspension Take 40 mg by mouth once daily.   5/26/2024    pravastatin (PRAVACHOL) 80 MG tablet Take 80 mg by mouth every evening.   5/26/2024    acetaminophen (TYLENOL) 500 MG tablet Take 1,000 mg by mouth every 6 (six) hours as needed for Pain or Temperature greater than.   Unknown       Potential issues to be addressed PRIOR TO DISCHARGE  N/A    Madeline Batres  EXT 2018                 .          "

## 2024-05-28 PROBLEM — I34.0 SEVERE MITRAL REGURGITATION: Status: ACTIVE | Noted: 2024-05-28

## 2024-05-28 PROBLEM — R79.89 ELEVATED TROPONIN: Status: ACTIVE | Noted: 2024-05-28

## 2024-05-28 LAB
ALBUMIN SERPL BCP-MCNC: 2.2 G/DL (ref 3.5–5)
ALBUMIN/GLOB SERPL: 0.6 {RATIO}
ALP SERPL-CCNC: 82 U/L (ref 45–115)
ALT SERPL W P-5'-P-CCNC: 68 U/L (ref 16–61)
ANION GAP SERPL CALCULATED.3IONS-SCNC: 12 MMOL/L (ref 7–16)
AORTIC ROOT ANNULUS: 3.08 CM
AORTIC VALVE CUSP SEPERATION: 2 CM
AST SERPL W P-5'-P-CCNC: 43 U/L (ref 15–37)
AV INDEX (PROSTH): 0.79
AV MEAN GRADIENT: 4 MMHG
AV PEAK GRADIENT: 5 MMHG
AV VALVE AREA BY VELOCITY RATIO: 2.31 CM²
AV VALVE AREA: 2.43 CM²
AV VELOCITY RATIO: 0.75
BASOPHILS # BLD AUTO: 0.02 K/UL (ref 0–0.2)
BASOPHILS NFR BLD AUTO: 0.1 % (ref 0–1)
BILIRUB SERPL-MCNC: 0.3 MG/DL (ref ?–1.2)
BSA FOR ECHO PROCEDURE: 1.95 M2
BUN SERPL-MCNC: 57 MG/DL (ref 7–18)
BUN/CREAT SERPL: 27 (ref 6–20)
CALCIUM SERPL-MCNC: 8.7 MG/DL (ref 8.5–10.1)
CHLORIDE SERPL-SCNC: 120 MMOL/L (ref 98–107)
CO2 SERPL-SCNC: 17 MMOL/L (ref 21–32)
CREAT SERPL-MCNC: 2.15 MG/DL (ref 0.7–1.3)
CV ECHO LV RWT: 0.78 CM
DIFFERENTIAL METHOD BLD: ABNORMAL
DOP CALC AO PEAK VEL: 1.12 M/S
DOP CALC AO VTI: 20.8 CM
DOP CALC LVOT AREA: 3.1 CM2
DOP CALC LVOT DIAMETER: 1.98 CM
DOP CALC LVOT PEAK VEL: 0.84 M/S
DOP CALC LVOT STROKE VOLUME: 50.47 CM3
DOP CALC MV VTI: 27.7 CM
DOP CALCLVOT PEAK VEL VTI: 16.4 CM
E WAVE DECELERATION TIME: 139.7 MSEC
E/A RATIO: 1.58
E/E' RATIO: 15.14 M/S
ECHO LV POSTERIOR WALL: 1.57 CM (ref 0.6–1.1)
EGFR (NO RACE VARIABLE) (RUSH/TITUS): 30 ML/MIN/1.73M2
EJECTION FRACTION: 50 %
EOSINOPHIL # BLD AUTO: 0.03 K/UL (ref 0–0.5)
EOSINOPHIL NFR BLD AUTO: 0.2 % (ref 1–4)
ERYTHROCYTE [DISTWIDTH] IN BLOOD BY AUTOMATED COUNT: 17.9 % (ref 11.5–14.5)
FRACTIONAL SHORTENING: 24 % (ref 28–44)
GLOBULIN SER-MCNC: 3.8 G/DL (ref 2–4)
GLUCOSE SERPL-MCNC: 104 MG/DL (ref 70–105)
GLUCOSE SERPL-MCNC: 108 MG/DL (ref 70–105)
GLUCOSE SERPL-MCNC: 76 MG/DL (ref 70–105)
GLUCOSE SERPL-MCNC: 84 MG/DL (ref 70–105)
GLUCOSE SERPL-MCNC: 90 MG/DL (ref 74–106)
HCT VFR BLD AUTO: 39.8 % (ref 40–54)
HGB BLD-MCNC: 11.6 G/DL (ref 13.5–18)
IMM GRANULOCYTES # BLD AUTO: 0.08 K/UL (ref 0–0.04)
IMM GRANULOCYTES NFR BLD: 0.6 % (ref 0–0.4)
INTERVENTRICULAR SEPTUM: 1.88 CM (ref 0.6–1.1)
IVC DIAMETER: 1.08 CM
LA MAJOR: 3.84 CM
LACTATE SERPL-SCNC: 1.7 MMOL/L (ref 0.4–2)
LACTATE SERPL-SCNC: 2.2 MMOL/L (ref 0.4–2)
LACTATE SERPL-SCNC: 2.3 MMOL/L (ref 0.4–2)
LEFT ATRIUM SIZE: 3.6 CM
LEFT INTERNAL DIMENSION IN SYSTOLE: 3.06 CM (ref 2.1–4)
LEFT VENTRICLE DIASTOLIC VOLUME INDEX: 36.11 ML/M2
LEFT VENTRICLE DIASTOLIC VOLUME: 71.13 ML
LEFT VENTRICLE MASS INDEX: 149 G/M2
LEFT VENTRICLE SYSTOLIC VOLUME INDEX: 18.7 ML/M2
LEFT VENTRICLE SYSTOLIC VOLUME: 36.85 ML
LEFT VENTRICULAR INTERNAL DIMENSION IN DIASTOLE: 4.03 CM (ref 3.5–6)
LEFT VENTRICULAR MASS: 294.34 G
LV LATERAL E/E' RATIO: 13.25 M/S
LV SEPTAL E/E' RATIO: 17.67 M/S
LVOT MG: 1.39 MMHG
LVOT MV: 0.54 CM/S
LYMPHOCYTES # BLD AUTO: 1.19 K/UL (ref 1–4.8)
LYMPHOCYTES NFR BLD AUTO: 8.4 % (ref 27–41)
MCH RBC QN AUTO: 27.6 PG (ref 27–31)
MCHC RBC AUTO-ENTMCNC: 29.1 G/DL (ref 32–36)
MCV RBC AUTO: 94.8 FL (ref 80–96)
MONOCYTES # BLD AUTO: 0.79 K/UL (ref 0–0.8)
MONOCYTES NFR BLD AUTO: 5.6 % (ref 2–6)
MPC BLD CALC-MCNC: 12.7 FL (ref 9.4–12.4)
MV MEAN GRADIENT: 3 MMHG
MV PEAK A VEL: 0.67 M/S
MV PEAK E VEL: 1.06 M/S
MV PEAK GRADIENT: 8 MMHG
MV STENOSIS PRESSURE HALF TIME: 34.67 MS
MV VALVE AREA BY CONTINUITY EQUATION: 1.82 CM2
MV VALVE AREA P 1/2 METHOD: 6.35 CM2
NEUTROPHILS # BLD AUTO: 12.09 K/UL (ref 1.8–7.7)
NEUTROPHILS NFR BLD AUTO: 85.1 % (ref 53–65)
NRBC # BLD AUTO: 0 X10E3/UL
NRBC, AUTO (.00): 0 %
OHS CV RV/LV RATIO: 0.98 CM
PISA MRMAX VEL: 5.5 M/S
PISA TR MAX VEL: 1.93 M/S
PLATELET # BLD AUTO: 161 K/UL (ref 150–400)
POTASSIUM SERPL-SCNC: 5.2 MMOL/L (ref 3.5–5.1)
PROT SERPL-MCNC: 6 G/DL (ref 6.4–8.2)
PV PEAK GRADIENT: 1 MMHG
PV PEAK VELOCITY: 0.61 M/S
RA MAJOR: 3.52 CM
RA PRESSURE ESTIMATED: 3 MMHG
RA WIDTH: 3.5 CM
RBC # BLD AUTO: 4.2 M/UL (ref 4.6–6.2)
RIGHT VENTRICULAR END-DIASTOLIC DIMENSION: 3.96 CM
RV TB RVSP: 5 MMHG
SODIUM SERPL-SCNC: 144 MMOL/L (ref 136–145)
TDI LATERAL: 0.08 M/S
TDI SEPTAL: 0.06 M/S
TDI: 0.07 M/S
TR MAX PG: 15 MMHG
TRICUSPID ANNULAR PLANE SYSTOLIC EXCURSION: 1.69 CM
TV REGURGITANT VOLUME: 15 CC
TV REST PULMONARY ARTERY PRESSURE: 18 MMHG
WBC # BLD AUTO: 14.2 K/UL (ref 4.5–11)
Z-SCORE OF LEFT VENTRICULAR DIMENSION IN END DIASTOLE: -3.4
Z-SCORE OF LEFT VENTRICULAR DIMENSION IN END SYSTOLE: -1.02

## 2024-05-28 PROCEDURE — 25000003 PHARM REV CODE 250: Performed by: NURSE PRACTITIONER

## 2024-05-28 PROCEDURE — 20000000 HC ICU ROOM

## 2024-05-28 PROCEDURE — 36415 COLL VENOUS BLD VENIPUNCTURE: CPT | Performed by: STUDENT IN AN ORGANIZED HEALTH CARE EDUCATION/TRAINING PROGRAM

## 2024-05-28 PROCEDURE — 63600175 PHARM REV CODE 636 W HCPCS: Performed by: INTERNAL MEDICINE

## 2024-05-28 PROCEDURE — 94761 N-INVAS EAR/PLS OXIMETRY MLT: CPT

## 2024-05-28 PROCEDURE — 99291 CRITICAL CARE FIRST HOUR: CPT | Mod: ,,, | Performed by: STUDENT IN AN ORGANIZED HEALTH CARE EDUCATION/TRAINING PROGRAM

## 2024-05-28 PROCEDURE — 80053 COMPREHEN METABOLIC PANEL: CPT | Performed by: STUDENT IN AN ORGANIZED HEALTH CARE EDUCATION/TRAINING PROGRAM

## 2024-05-28 PROCEDURE — 63600175 PHARM REV CODE 636 W HCPCS: Performed by: STUDENT IN AN ORGANIZED HEALTH CARE EDUCATION/TRAINING PROGRAM

## 2024-05-28 PROCEDURE — C9113 INJ PANTOPRAZOLE SODIUM, VIA: HCPCS | Performed by: STUDENT IN AN ORGANIZED HEALTH CARE EDUCATION/TRAINING PROGRAM

## 2024-05-28 PROCEDURE — 92610 EVALUATE SWALLOWING FUNCTION: CPT

## 2024-05-28 PROCEDURE — 99223 1ST HOSP IP/OBS HIGH 75: CPT | Mod: ,,, | Performed by: NURSE PRACTITIONER

## 2024-05-28 PROCEDURE — 94660 CPAP INITIATION&MGMT: CPT

## 2024-05-28 PROCEDURE — 94640 AIRWAY INHALATION TREATMENT: CPT

## 2024-05-28 PROCEDURE — 82962 GLUCOSE BLOOD TEST: CPT

## 2024-05-28 PROCEDURE — 85025 COMPLETE CBC W/AUTO DIFF WBC: CPT | Performed by: INTERNAL MEDICINE

## 2024-05-28 PROCEDURE — 25000242 PHARM REV CODE 250 ALT 637 W/ HCPCS: Performed by: STUDENT IN AN ORGANIZED HEALTH CARE EDUCATION/TRAINING PROGRAM

## 2024-05-28 PROCEDURE — 25000003 PHARM REV CODE 250: Performed by: STUDENT IN AN ORGANIZED HEALTH CARE EDUCATION/TRAINING PROGRAM

## 2024-05-28 PROCEDURE — 27000221 HC OXYGEN, UP TO 24 HOURS

## 2024-05-28 PROCEDURE — 99900035 HC TECH TIME PER 15 MIN (STAT)

## 2024-05-28 PROCEDURE — 83605 ASSAY OF LACTIC ACID: CPT | Performed by: STUDENT IN AN ORGANIZED HEALTH CARE EDUCATION/TRAINING PROGRAM

## 2024-05-28 RX ORDER — HEPARIN SODIUM 5000 [USP'U]/ML
5000 INJECTION, SOLUTION INTRAVENOUS; SUBCUTANEOUS EVERY 8 HOURS
Status: DISCONTINUED | OUTPATIENT
Start: 2024-05-28 | End: 2024-05-28

## 2024-05-28 RX ORDER — DILTIAZEM HYDROCHLORIDE 5 MG/ML
5 INJECTION INTRAVENOUS EVERY 12 HOURS
Status: DISCONTINUED | OUTPATIENT
Start: 2024-05-28 | End: 2024-05-28

## 2024-05-28 RX ORDER — CARVEDILOL 3.12 MG/1
3.12 TABLET ORAL 2 TIMES DAILY
Status: DISCONTINUED | OUTPATIENT
Start: 2024-05-28 | End: 2024-05-29

## 2024-05-28 RX ORDER — FUROSEMIDE 10 MG/ML
80 INJECTION INTRAMUSCULAR; INTRAVENOUS DAILY
Status: DISCONTINUED | OUTPATIENT
Start: 2024-05-28 | End: 2024-05-28

## 2024-05-28 RX ORDER — NAPROXEN SODIUM 220 MG/1
81 TABLET, FILM COATED ORAL DAILY
Status: DISCONTINUED | OUTPATIENT
Start: 2024-05-29 | End: 2024-05-29

## 2024-05-28 RX ORDER — FUROSEMIDE 10 MG/ML
80 INJECTION INTRAMUSCULAR; INTRAVENOUS ONCE
Status: COMPLETED | OUTPATIENT
Start: 2024-05-28 | End: 2024-05-28

## 2024-05-28 RX ORDER — LEVETIRACETAM 100 MG/ML
500 SOLUTION ORAL 2 TIMES DAILY
Status: DISCONTINUED | OUTPATIENT
Start: 2024-05-28 | End: 2024-06-05 | Stop reason: HOSPADM

## 2024-05-28 RX ORDER — HEPARIN SODIUM,PORCINE/D5W 25000/250
0-40 INTRAVENOUS SOLUTION INTRAVENOUS CONTINUOUS
Status: DISCONTINUED | OUTPATIENT
Start: 2024-05-28 | End: 2024-05-28

## 2024-05-28 RX ADMIN — MIRTAZAPINE 15 MG: 15 TABLET, FILM COATED ORAL at 08:05

## 2024-05-28 RX ADMIN — IPRATROPIUM BROMIDE AND ALBUTEROL SULFATE 3 ML: 2.5; .5 SOLUTION RESPIRATORY (INHALATION) at 02:05

## 2024-05-28 RX ADMIN — MUPIROCIN: 20 OINTMENT TOPICAL at 09:05

## 2024-05-28 RX ADMIN — HYDRALAZINE HYDROCHLORIDE: 10 TABLET, FILM COATED ORAL at 08:05

## 2024-05-28 RX ADMIN — FUROSEMIDE 80 MG: 10 INJECTION, SOLUTION INTRAMUSCULAR; INTRAVENOUS at 03:05

## 2024-05-28 RX ADMIN — CARVEDILOL 3.12 MG: 3.12 TABLET, FILM COATED ORAL at 04:05

## 2024-05-28 RX ADMIN — FUROSEMIDE 80 MG: 10 INJECTION, SOLUTION INTRAMUSCULAR; INTRAVENOUS at 09:05

## 2024-05-28 RX ADMIN — IPRATROPIUM BROMIDE AND ALBUTEROL SULFATE 3 ML: 2.5; .5 SOLUTION RESPIRATORY (INHALATION) at 12:05

## 2024-05-28 RX ADMIN — DILTIAZEM HYDROCHLORIDE 5 MG: 5 INJECTION INTRAVENOUS at 10:05

## 2024-05-28 RX ADMIN — LEVETIRACETAM 500 MG: 500 SOLUTION ORAL at 08:05

## 2024-05-28 RX ADMIN — HYDRALAZINE HYDROCHLORIDE: 10 TABLET, FILM COATED ORAL at 04:05

## 2024-05-28 RX ADMIN — PANTOPRAZOLE SODIUM 40 MG: 40 INJECTION, POWDER, LYOPHILIZED, FOR SOLUTION INTRAVENOUS at 09:05

## 2024-05-28 RX ADMIN — AMIODARONE HYDROCHLORIDE 0.5 MG/MIN: 1.8 INJECTION, SOLUTION INTRAVENOUS at 04:05

## 2024-05-28 RX ADMIN — IPRATROPIUM BROMIDE AND ALBUTEROL SULFATE 3 ML: 2.5; .5 SOLUTION RESPIRATORY (INHALATION) at 07:05

## 2024-05-28 RX ADMIN — MUPIROCIN: 20 OINTMENT TOPICAL at 08:05

## 2024-05-28 RX ADMIN — HEPARIN SODIUM 5000 UNITS: 5000 INJECTION, SOLUTION INTRAVENOUS; SUBCUTANEOUS at 03:05

## 2024-05-28 RX ADMIN — LEVETIRACETAM 500 MG: 100 INJECTION, SOLUTION INTRAVENOUS at 09:05

## 2024-05-28 RX ADMIN — PRAVASTATIN SODIUM 80 MG: 40 TABLET ORAL at 08:05

## 2024-05-28 RX ADMIN — FUROSEMIDE 3 MG/HR: 10 INJECTION, SOLUTION INTRAMUSCULAR; INTRAVENOUS at 03:05

## 2024-05-28 NOTE — PT/OT/SLP EVAL
Speech Language Pathology Evaluation  Bedside Swallow    Patient Name:  Claude Patrick   MRN:  [id number]  Admitting Diagnosis: Hypertensive emergency    Recommendations:                 General Recommendations:  Follow-up not indicated  Diet recommendations:  Puree, Nectar Thick   Aspiration Precautions: 1 bite/sip at a time, Alternating bites/sips, Assistance with meals and Assistance with thickening liquids, Feed only when awake/alert, Frequent oral care, HOB to 90 degrees, Remain upright 30 minutes post meal, Small bites/sips, and Standard aspiration precautions   General Precautions: Standard,    Communication strategies:   Patient has expressive aphasia (per chart).    Assessment:     Claude Patrick is a 83 y.o. male with an SLP diagnosis of Dysphagia.  He presents with being unable to tolerate thin liquids.    History:     Past Medical History:   Diagnosis Date    Depression     Hemiplegia and hemiparesis following cerebral infarction affecting right dominant side     Hypertension     Seizures     Stroke     Vascular dementia        No past surgical history on file.    Social History: Patient lives at [facility].    Prior Intubation HX:  see chart    Modified Barium Swallow: n/a    Chest X-Rays: see chart    Prior diet: unknown.    Occupation/hobbies/homemaking: not stated.    Subjective     Patient lying in bed resting. Patient easily aroused. Patient agreeable to BEDSIDE SWALLOW EVALUATION.   Patient goals: not stated      Pain/Comfort:  Pain Rating 1: 0/10    Respiratory Status: Nasal cannula, flow 4 L/min    Objective:     Oral Musculature Evaluation  Dentition: scattered dentition, teeth in poor condition  Secretion Management: adequate  Mucosal Quality: adequate  Oral Labial Strength and Mobility: WFL  Lingual Strength and Mobility: WFL    Bedside Swallow Eval:   Consistencies Assessed:  Thin liquids Patient was given small trials of water via a spoon and a straw. No difficulties noted with  trials via a spoon. Patient took ~ 5 small trials of water via a straw without difficulty but coughed on the next trial. Patient's nurse reported that is how he did in their assessment.   Nectar thick liquids Patient was given nectar thickened trials of water via a straw. No difficulties noted. No overt s/s of aspiration were noted with any trials.   Puree Patient was given small bites of apple sauce via a spoon. No difficulties noted with any trials. No overt s/s of aspiration noted on any trials.       Oral Phase:   WFL    Pharyngeal Phase:   Coughing after swallow with thin liquids via a straw    Compensatory Strategies  None    Treatment: Rec a pureed consistency diet with nectar thickened liquids as tolerated. Patient will need assistance with po intake at this time.     Goals:   Multidisciplinary Problems       SLP Goals       Not on file                    Plan:     Patient to be seen:      Plan of Care expires:     Plan of Care reviewed with:      SLP Follow-Up:  No (as needed)       Discharge recommendations:      Barriers to Discharge:  Level of Skilled Assistance Needed return to nursing home    Time Tracking:     SLP Treatment Date:      Speech Start Time:  1220  Speech Stop Time:  1250     Speech Total Time (min):  30 min    Billable Minutes: Eval Swallow and Oral Function 30 05/28/2024

## 2024-05-28 NOTE — HPI
82 yo M with CVA w/ expressive aphasia and residual R sided weakness, HTN, prostate cancer s/p prostatectomy (2004),  FTT, seizure disorder who resides at [facility] (since 10/2023) presented 05/27 with shortness of breath found to be in respiratory distress with admission to ICU for management of hypertensive emergency.      History was obtained from records as patient is a poor historian at baseline.  Patient presented from his nursing home with acute respiratory distress.  There was concern for an aspiration event.  On presentation to the ED he was noted to be hypoxic with SpO2 93%   Associated with increased work of breathing.  A chest x-ray demonstrated hazy opacification of bilateral lungs. He was recently admitted 04/30-05/03 with hypernatremia 2/2 hypovolemia which was attributed to his decreased PO intake.     5/28/2024:  Cardiology consulted for severe MR, flash pulmonary edema. Patient seen today, awake and alert, but not offering any answers to yes/no questions. No family at bedside.

## 2024-05-28 NOTE — ASSESSMENT & PLAN NOTE
- Patient seen and evaluated by Dr. Kauffman  - Echo with EF 50-55%, severe MR  - Agree with diuresis, afterload reduction added   - Continue monitoring

## 2024-05-28 NOTE — PLAN OF CARE
Problem: Adult Inpatient Plan of Care  Goal: Plan of Care Review  Outcome: Progressing  Goal: Patient-Specific Goal (Individualized)  Outcome: Progressing  Goal: Absence of Hospital-Acquired Illness or Injury  Outcome: Progressing  Goal: Optimal Comfort and Wellbeing  Outcome: Progressing  Goal: Readiness for Transition of Care  Outcome: Progressing     Problem: Acute Kidney Injury/Impairment  Goal: Fluid and Electrolyte Balance  Outcome: Progressing  Goal: Improved Oral Intake  Outcome: Progressing  Goal: Effective Renal Function  Outcome: Progressing     Problem: Wound  Goal: Optimal Coping  Outcome: Progressing  Goal: Optimal Functional Ability  Outcome: Progressing  Goal: Absence of Infection Signs and Symptoms  Outcome: Progressing  Goal: Improved Oral Intake  Outcome: Progressing  Goal: Optimal Pain Control and Function  Outcome: Progressing  Goal: Skin Health and Integrity  Outcome: Progressing  Goal: Optimal Wound Healing  Outcome: Progressing     Problem: Fall Injury Risk  Goal: Absence of Fall and Fall-Related Injury  Outcome: Progressing     Problem: Skin Injury Risk Increased  Goal: Skin Health and Integrity  Outcome: Progressing     Problem: Infection  Goal: Absence of Infection Signs and Symptoms  Outcome: Progressing

## 2024-05-28 NOTE — SUBJECTIVE & OBJECTIVE
Past Medical History:   Diagnosis Date    Depression     Hemiplegia and hemiparesis following cerebral infarction affecting right dominant side     Hypertension     Seizures     Stroke     Vascular dementia        No past surgical history on file.    Review of patient's allergies indicates:  No Known Allergies    No current facility-administered medications on file prior to encounter.     Current Outpatient Medications on File Prior to Encounter   Medication Sig    albuterol-ipratropium (DUO-NEB) 2.5 mg-0.5 mg/3 mL nebulizer solution Take 3 mLs by nebulization every 6 (six) hours as needed.    aspirin (ECOTRIN) 81 MG EC tablet Take 1 tablet by mouth once daily.    clopidogreL (PLAVIX) 75 mg tablet Take 75 mg by mouth once daily.    latanoprost 0.005 % ophthalmic solution Place 1 drop into both eyes every evening.    levETIRAcetam (KEPPRA) 500 MG Tab Take 500 mg by mouth 2 (two) times daily.    megestroL (MEGACE) 400 mg/10 mL (40 mg/mL) Susp Take 5 mLs by mouth once daily.    mirtazapine (REMERON) 15 MG tablet Take 15 mg by mouth every evening.    pantoprazole (PROTONIX) 40 mg suspension Take 40 mg by mouth once daily.    pravastatin (PRAVACHOL) 80 MG tablet Take 80 mg by mouth every evening.    acetaminophen (TYLENOL) 500 MG tablet Take 1,000 mg by mouth every 6 (six) hours as needed for Pain or Temperature greater than.     Family History    None       Tobacco Use    Smoking status: Unknown    Smokeless tobacco: Not on file   Substance and Sexual Activity    Alcohol use: Not Currently    Drug use: Not Currently    Sexual activity: Not on file     Review of Systems   Reason unable to perform ROS: unable to obtain, aphasia from previous CVA.     Objective:     Vital Signs (Most Recent):  Temp: 97.8 °F (36.6 °C) (05/28/24 1700)  Pulse: 79 (05/28/24 1830)  Resp: 18 (05/28/24 1830)  BP: (!) 109/53 (05/28/24 1830)  SpO2: 100 % (05/28/24 1830) Vital Signs (24h Range):  Temp:  [97.8 °F (36.6 °C)-98.5 °F (36.9 °C)] 97.8 °F  "(36.6 °C)  Pulse:  [] 79  Resp:  [9-19] 18  SpO2:  [89 %-100 %] 100 %  BP: ()/() 109/53     Weight: 75.2 kg (165 lb 12.6 oz)  Body mass index is 22.48 kg/m².    SpO2: 100 %         Intake/Output Summary (Last 24 hours) at 5/28/2024 1843  Last data filed at 5/28/2024 1800  Gross per 24 hour   Intake 733.87 ml   Output 1710 ml   Net -976.13 ml       Lines/Drains/Airways       Drain  Duration                  Urethral Catheter 05/27/24 1830 Coude 16 Fr. 1 day              Peripheral Intravenous Line  Duration                  Peripheral IV - Single Lumen 05/27/24 1012 20 G Anterior;Distal;Right Forearm 1 day         Peripheral IV - Single Lumen 05/27/24 20 G Left;Posterior Forearm 1 day         Peripheral IV - Single Lumen 05/28/24 0715 20 G;2 in;1/2 in Anterior;Left Forearm <1 day                     Physical Exam  Vitals reviewed.   Constitutional:       General: He is not in acute distress.  Eyes:      General: No scleral icterus.  Cardiovascular:      Rate and Rhythm: Normal rate and regular rhythm.      Heart sounds: Murmur heard.   Pulmonary:      Effort: Pulmonary effort is normal.      Breath sounds: Wheezing present.   Abdominal:      General: Bowel sounds are normal.      Palpations: Abdomen is soft.   Musculoskeletal:      Right lower leg: Edema present.      Left lower leg: Edema present.   Skin:     General: Skin is warm and dry.   Neurological:      Mental Status: He is alert. Mental status is at baseline.          Significant Labs: ABG:   Recent Labs   Lab 05/27/24  1011   PH 7.32   PCO2 42   HCO3 21.6*   POCSATURATED 27*   , Blood Culture: No results for input(s): "LABBLOO" in the last 48 hours., BMP:   Recent Labs   Lab 05/27/24  1110 05/27/24  1713 05/28/24  1423   GLU 94 90 90    148* 144   K 5.4* 5.0 5.2*   * 122* 120*   CO2 18* 21 17*   BUN 57* 57* 57*   CREATININE 1.82* 1.98* 2.15*   CALCIUM 8.4* 8.4* 8.7   MG 2.7*  --   --    , CMP   Recent Labs   Lab 05/27/24  1110 " "05/27/24  1713 05/28/24  1423    148* 144   K 5.4* 5.0 5.2*   * 122* 120*   CO2 18* 21 17*   GLU 94 90 90   BUN 57* 57* 57*   CREATININE 1.82* 1.98* 2.15*   CALCIUM 8.4* 8.4* 8.7   PROT 6.6  --  6.0*   ALBUMIN 2.4*  --  2.2*   BILITOT 0.5  --  0.3   ALKPHOS 92  --  82   AST 50*  --  43*   ALT 81*  --  68*   ANIONGAP 9 10 12   , CBC   Recent Labs   Lab 05/27/24  1007 05/27/24  1011 05/27/24  2312 05/28/24  0528   WBC 10.63  --  16.07* 14.20*   HGB 12.3*  --  11.2* 11.6*   HCT 43.8   < > 37.6* 39.8*     --  176 161    < > = values in this interval not displayed.   , Lipid Panel No results for input(s): "CHOL", "HDL", "LDLCALC", "TRIG", "CHOLHDL" in the last 48 hours., and Troponin No results for input(s): "TROPONINI" in the last 48 hours.    Significant Imaging: Echocardiogram: Transthoracic echo (TTE) complete (Cupid Only):   Results for orders placed or performed during the hospital encounter of 05/27/24   Echo   Result Value Ref Range    BSA 1.95 m2    EF 50 %    Est. RA pres 3 mmHg    LVIDd 4.03 3.5 - 6.0 cm    LVIDs 3.06 2.1 - 4.0 cm    IVS 1.88 (A) 0.6 - 1.1 cm    FS 24 (A) 28 - 44 %    Left Ventricle Relative Wall Thickness 0.78 cm    Posterior Wall 1.57 (A) 0.6 - 1.1 cm    LV mass 294.34 g    LV Mass Index 149 g/m2    RVDD 3.96 cm    RV/LV Ratio 0.98 cm    LA size 3.60 cm    RA Width 3.5 cm    TV Regurgitant Volume 15.0 cc    Ao root annulus 3.08 cm    IVC diameter 1.08 cm    ZLVIDS -1.02     ZLVIDD -3.40     LVOT stroke volume 50.47 cm3    LV Systolic Volume 36.85 mL    LV Systolic Volume Index 18.7 mL/m2    LV Diastolic Volume 71.13 mL    LV Diastolic Volume Index 36.11 mL/m2    LVOT diameter 1.98 cm    LVOT area 3.1 cm2    MV Peak E Asael 1.06 m/s    TDI LATERAL 0.08 m/s    TDI SEPTAL 0.06 m/s    E/E' ratio 15.14 m/s    MV Peak A Asael 0.67 m/s    TR Max Asael 1.93 m/s    E/A ratio 1.58     E wave deceleration time 139.70 msec    LV SEPTAL E/E' RATIO 17.67 m/s    LV LATERAL E/E' RATIO 13.25 m/s "    LVOT peak jerry 0.84 m/s    Left Ventricular Outflow Tract Mean Velocity 0.54 cm/s    Left Ventricular Outflow Tract Mean Gradient 1.39 mmHg    TAPSE 1.69 cm    Left Atrium Major Axis 3.84 cm    RA Major Axis 3.52 cm    AV mean gradient 4 mmHg    AV peak gradient 5 mmHg    Ao peak jerry 1.12 m/s    Ao VTI 20.80 cm    LVOT peak VTI 16.40 cm    AV valve area 2.43 cm²    AV Velocity Ratio 0.75     AV index (prosthetic) 0.79     MEAGAN by Velocity Ratio 2.31 cm²    Mr max jerry 5.50 m/s    MV mean gradient 3 mmHg    MV peak gradient 8 mmHg    MV stenosis pressure 1/2 time 34.67 ms    MV valve area p 1/2 method 6.35 cm2    MV valve area by continuity eq 1.82 cm2    MV VTI 27.7 cm    TV resting pulmonary artery pressure 18 mmHg    RV TB RVSP 5 mmHg    Triscuspid Valve Regurgitation Peak Gradient 15 mmHg    PV PEAK VELOCITY 0.61 m/s    PV peak gradient 1 mmHg    Mean e' 0.07 m/s    AORTIC VALVE CUSP SEPERATION 2.00 cm    Narrative      Left Ventricle: The left ventricle is normal in size. Moderately   increased wall thickness. Regional wall motion abnormalities present.   Inferior wall hypokinesis There is low normal systolic function with a   visually estimated ejection fraction of 50 - 55%. Ejection fraction by   visual approximation is 50%.    Right Ventricle: Mild right ventricular enlargement.    Aortic Valve: The aortic valve is a trileaflet valve. There is mild   aortic valve sclerosis. Mildly calcified cusps.    Mitral Valve: There is mild bileaflet sclerosis. Mildly thickened   leaflets. There is severe regurgitation with a centrally directed jet.    IVC/SVC: Normal venous pressure at 3 mmHg.    Pericardium: There is a trivial effusion.       , EKG: No results found for this or any previous visit. , and     X-Ray: CXR: X-Ray Chest 1 View (CXR): X-Ray Chest AP Portable  Order: [id number]  Status: Final result       Visible to patient: No (inaccessible in Patient Portal)       Next appt: None    0 Result  Notes  Details    Reading Physician Reading Date Tico Love MD  [phone] 5/28/2024 Routine     Narrative & Impression  EXAMINATION:  XR CHEST AP PORTABLE     CLINICAL HISTORY:  interval exam, wheezing, pulmonary edema;     TECHNIQUE:  Single frontal view of the chest was performed.     COMPARISON:  05/27/2024     FINDINGS:  Again seen are bilateral parenchymal and interstitial opacities similar to what was seen previously.  No pneumothorax or new abnormality within the chest.     Impression:     The bilateral opacities are similar to prior exam.        Electronically signed by:Tico Dela Cruz  Date:                                            05/28/2024  Time:                                           09:47

## 2024-05-28 NOTE — PLAN OF CARE
Problem: Acute Kidney Injury/Impairment  Goal: Improved Oral Intake  Outcome: Progressing  Intervention: Promote and Optimize Oral Intake  Flowsheets (Taken 5/27/2024 1947)  Oral Nutrition Promotion: physical activity promoted  Nutrition Interventions:   diet adjusted   other (see comments)     Problem: Skin Injury Risk Increased  Goal: Skin Health and Integrity  Outcome: Progressing  Intervention: Optimize Skin Protection  Flowsheets (Taken 5/27/2024 1947)  Activity Management: Rolling - L1  Intervention: Promote and Optimize Oral Intake  Flowsheets (Taken 5/27/2024 1947)  Oral Nutrition Promotion: physical activity promoted  Nutrition Interventions:   diet adjusted   other (see comments)

## 2024-05-28 NOTE — PLAN OF CARE
Problem: Adult Inpatient Plan of Care  Goal: Plan of Care Review  Outcome: Progressing  Goal: Patient-Specific Goal (Individualized)  Outcome: Progressing  Goal: Absence of Hospital-Acquired Illness or Injury  Outcome: Progressing  Goal: Optimal Comfort and Wellbeing  Outcome: Progressing  Goal: Readiness for Transition of Care  Outcome: Progressing     Problem: Acute Kidney Injury/Impairment  Goal: Fluid and Electrolyte Balance  Outcome: Progressing  Goal: Improved Oral Intake  Outcome: Progressing  Goal: Effective Renal Function  Outcome: Progressing     Problem: Wound  Goal: Optimal Coping  Outcome: Progressing  Goal: Optimal Functional Ability  Outcome: Progressing  Goal: Absence of Infection Signs and Symptoms  Outcome: Progressing  Goal: Improved Oral Intake  Outcome: Progressing  Goal: Optimal Pain Control and Function  Outcome: Progressing  Goal: Skin Health and Integrity  Outcome: Progressing  Goal: Optimal Wound Healing  Outcome: Progressing     Problem: Fall Injury Risk  Goal: Absence of Fall and Fall-Related Injury  Outcome: Progressing     Problem: Skin Injury Risk Increased  Goal: Skin Health and Integrity  Outcome: Progressing     Problem: Infection  Goal: Absence of Infection Signs and Symptoms  Outcome: Progressing     Problem: Gas Exchange Impaired  Goal: Optimal Gas Exchange  Outcome: Progressing

## 2024-05-28 NOTE — ASSESSMENT & PLAN NOTE
- Currently on IV nitro, being followed by critical care team  - Have added Bidil and coreg for blood pressure and heart rate control as well as afterload reduction in setting of severe MR. Wean off nitro gtt as BP allows

## 2024-05-28 NOTE — CARE UPDATE
Discussed with Cardiology. Tn elevation 2/2 type 2 NSTEMI. Will work on obtaining TTE records from OSH if available. Cardiology providing GDMT recommendations.

## 2024-05-28 NOTE — ASSESSMENT & PLAN NOTE
- Patient seen and evaluated by Dr. Kauffman  - Previous echo at [facility] in 2020 with normal EF, trace MR, and no segmental wall motion abnormality  - Repeat echo with EF 50-55%, regional wall motion abnormalities, inferior wall hypokinesis, and severe MR  - Recommend medical management at this time, afterload reduction initiated. Continue diuresis. Can consider referral to structural cardiology if unable to manage medically and family wishes.  - Continue monitoring

## 2024-05-28 NOTE — ASSESSMENT & PLAN NOTE
No baseline TTE in this patient presenting with hypertensive emergency and flash pulmonary edema now found to have severe MR with normal LA size and PASP. Concern for acute MR of unclear etiology. Will obtain Cardiology consult for evaluation.   - start Lasix IV bolus with gtt  - monitor Is/Os  - Cardiology consulted, f/u recs

## 2024-05-28 NOTE — SUBJECTIVE & OBJECTIVE
Interval History/Significant Events: improved UOP on gtt with increase SCr, afebrile, RVR persists and responding to BB    Review of Systems  Objective:     Vital Signs (Most Recent):  Temp: 98.1 °F (36.7 °C) (05/29/24 1115)  Pulse: (!) 122 (05/29/24 1300)  Resp: 11 (05/29/24 1300)  BP: (!) 121/97 (05/29/24 1300)  SpO2: 100 % (05/29/24 1230) Vital Signs (24h Range):  Temp:  [97.8 °F (36.6 °C)-98.8 °F (37.1 °C)] 98.1 °F (36.7 °C)  Pulse:  [] 122  Resp:  [9-28] 11  SpO2:  [67 %-100 %] 100 %  BP: (107-164)/() 121/97   Weight: 69.2 kg (152 lb 8.9 oz)  Body mass index is 20.69 kg/m².      Intake/Output Summary (Last 24 hours) at 5/29/2024 1441  Last data filed at 5/29/2024 0900  Gross per 24 hour   Intake 146.12 ml   Output 1670 ml   Net -1523.88 ml          Physical Exam  Constitutional:       General: He is not in acute distress.     Appearance: He is ill-appearing.   HENT:      Head: Normocephalic and atraumatic.   Eyes:      General: No scleral icterus.  Cardiovascular:      Rate and Rhythm: Tachycardia present. Rhythm irregular.      Heart sounds: Murmur heard.   Pulmonary:      Effort: Pulmonary effort is normal.      Breath sounds: Wheezing present.   Abdominal:      Palpations: Abdomen is soft.      Tenderness: There is no abdominal tenderness. There is no guarding.   Musculoskeletal:      Right lower leg: Edema present.      Left lower leg: Edema present.   Skin:     General: Skin is warm.      Coloration: Skin is not jaundiced.   Neurological:      Mental Status: He is alert. Mental status is at baseline.            Vents:  Oxygen Concentration (%): 28 (05/29/24 0702)  Lines/Drains/Airways       Drain  Duration                  Urethral Catheter 05/27/24 1830 Coude 16 Fr. 1 day              Peripheral Intravenous Line  Duration                  Peripheral IV - Single Lumen 05/27/24 1012 20 G Anterior;Distal;Right Forearm 2 days         Peripheral IV - Single Lumen 05/27/24 20 G Left;Posterior  Forearm 2 days         Peripheral IV - Single Lumen 05/28/24 0715 20 G;2 in;1/2 in Anterior;Left Forearm 1 day                  Significant Labs:    CBC/Anemia Profile:  Recent Labs   Lab 05/27/24  2312 05/28/24  0528   WBC 16.07* 14.20*   HGB 11.2* 11.6*   HCT 37.6* 39.8*    161   MCV 93.3 94.8   RDW 17.7* 17.9*        Chemistries:  Recent Labs   Lab 05/27/24  1713 05/28/24  1423 05/29/24  0313   * 144 144   K 5.0 5.2* 5.0   * 120* 119*   CO2 21 17* 19*   BUN 57* 57* 63*   CREATININE 1.98* 2.15* 2.33*   CALCIUM 8.4* 8.7 8.8   ALBUMIN  --  2.2* 2.5*   PROT  --  6.0* 6.5   BILITOT  --  0.3 0.4   ALKPHOS  --  82 87   ALT  --  68* 67*   AST  --  43* 46*       All pertinent labs within the past 24 hours have been reviewed.    Significant Imaging:  I have reviewed all pertinent imaging results/findings within the past 24 hours.

## 2024-05-28 NOTE — H&P
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     History was obtained from records as patient is a poor historian at baseline.  Patient presented from his nursing home with acute respiratory distress.  There was concern for an aspiration event.  On presentation to the ED he was noted to be hypoxic with SpO2 93%   Associated with increased work of breathing.  A chest x-ray demonstrated hazy opacification of bilateral lungs. He was recently admitted 04/30-05/03 with hypernatremia 2/2 hypovolemia which was attributed to his decreased PO intake.   ED course with patient receiving vancomycin IV x1, Zosyn IV x1 and NS IV x1L. On my assessment, recommended diuresis with Lasix IV, NIV settings were changed and started him on a nitro glycerin drip.          Hospital/ICU Course:  No notes on file     Past Medical History:   Diagnosis Date    Depression      Hemiplegia and hemiparesis following cerebral infarction affecting right dominant side      Hypertension      Seizures      Stroke      Vascular dementia           No past surgical history on file.     Review of patient's allergies indicates:  No Known Allergies     Family History    None              Tobacco Use    Smoking status: Unknown    Smokeless tobacco: Not on file   Substance and Sexual Activity    Alcohol use: Not Currently    Drug use: Not  Currently    Sexual activity: Not on file      Review of Systems  Objective:      Vital Signs (Most Recent):  Temp: 97.4 °F (36.3 °C) (05/27/24 0859)  Pulse: 95 (05/27/24 1437)  Resp: 11 (05/27/24 1437)  BP: (!) 161/97 (05/27/24 1437)  SpO2: 97 % (05/27/24 1437) Vital Signs (24h Range):  Temp:  [97.4 °F (36.3 °C)] 97.4 °F (36.3 °C)  Pulse:  [] 95  Resp:  [10-21] 11  SpO2:  [89 %-100 %] 97 %  BP: (146-200)/() 161/97   Weight: 70.8 kg (156 lb)  Body mass index is 23.04 kg/m².        Intake/Output Summary (Last 24 hours) at 5/27/2024 1603  Last data filed at 5/27/2024 1304      Gross per 24 hour   Intake 1314.89 ml   Output --   Net 1314.89 ml            Physical Exam  Constitutional:       General: He is in acute distress.      Appearance: He is ill-appearing.   HENT:      Head: Normocephalic and atraumatic.   Eyes:      General: No scleral icterus.  Cardiovascular:      Rate and Rhythm: Tachycardia present. Rhythm irregular.      Heart sounds: Normal heart sounds.   Pulmonary:      Effort: Pulmonary effort is normal.      Breath sounds: Wheezing present. No rhonchi or rales.      Comments: On BPAP  Abdominal:      Palpations: Abdomen is soft.      Tenderness: There is no abdominal tenderness. There is no guarding or rebound.   Musculoskeletal:      Right lower leg: Edema present.      Left lower leg: Edema present.      Comments: pitting   Skin:     General: Skin is warm.   Neurological:      Mental Status: He is alert. Mental status is at baseline.              Vents:     Lines/Drains/Airways         Peripheral Intravenous Line  Duration                     Peripheral IV - Single Lumen 05/27/24 1012 20 G Anterior;Distal;Right Forearm <1 day          Peripheral IV - Single Lumen 05/27/24 20 G Left;Posterior Forearm <1 day                       Significant Labs:     CBC/Anemia Profile:       Recent Labs   Lab 05/27/24  1007 05/27/24  1011   WBC 10.63  --    HGB 12.3*  --    HCT 43.8 39     --    MCV  98.4*  --    RDW 18.5*  --          Chemistries:      Recent Labs   Lab 05/27/24  1110      K 5.4*   *   CO2 18*   BUN 57*   CREATININE 1.82*   CALCIUM 8.4*   ALBUMIN 2.4*   PROT 6.6   BILITOT 0.5   ALKPHOS 92   ALT 81*   AST 50*   MG 2.7*         All pertinent labs within the past 24 hours have been reviewed.     Significant Imaging: I have reviewed all pertinent imaging results/findings within the past 24 hours.     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  Does not ambulate at baseline.  On Plavix and aspirin.  Presenting in hypertensive emergency.  - unsuccessful attempt to call wife, will try again in evening  - obtain stat CT Head as part of w/u of hypertensive crisis  - cont ASA and Plavix    Pulmonary  Acute respiratory failure with hypoxia  CXR with diffuse interstitial opacities consistent with pulmonary edema. VBG reviewed with  mild metabolic acidosis and no hypercapnia.   Placed on BPAP with good volume.   - change BPAP settings to 15/8 and 30%, rise 1  - supplement for goal SpO2 >92%  - diuresis as per hypertensive emergency plan  - most likely a cardiac wheeze given imaging; will treat with scheduled DuoNebs x48 hrs    Cardiac/Vascular  * Hypertensive emergency  Presenting with pulmonary edema a/w respiratory distress. Markedly elevated BP with signs concerning for heart failure now found to have NTproBNP highest to date. Unclear cardiac history with no baseline TTE. Bedside echo with hyperdynamic LV and normal RV.   - start nitroglycerin gtt for goal SBP <140  - trend Tn, ordered STAT  - f/u on EKG, unavailable at time of assessment  - obtain TTE  - overall low suspicion for  sepsis at this time. Will stop Abx. Monitor fever curve and f/u Cxs (BCx obtained, UA pending).    Renal/  Hyperkalemia  Hyperkalemia with renal function at baseline (SCr 1.82 with prior baseline 2-2.7). EKG pending.   - repeat BMP in PM and then daily  - treated with IV lasix      Critical Care Medicine Daily Checklist:05/27/2024   F: Feeding NPO   A: Analgesia   N/A   S: Sedation N/A N/A           T: Thromboprophylaxis Lower extremity SCD and Heparin SQ   H: HOB > 300 Yes.   U: Stress Ulcer Prophylaxis N/A   G: Glucose Control Within goal   S: SAT & SBT Coordinated?  N/A   B: Bowel Regimen Bowel regimen ordered.   I: Indwelling Catheter Reviewed Maintain: N/A  Remove: N/A   D: De-escalation of Antimicrobials Yes    Disposition ICU       Critical Care Time: 60 minutes  Critical secondary to Patient has a condition that poses threat to life and bodily function: Severe Respiratory Distress      Critical care was time spent personally by me on the following activities: development of treatment plan with patient or surrogate and bedside caregivers, discussions with consultants, evaluation of patient's response to treatment, examination of patient, ordering and performing treatments and interventions, ordering and review of laboratory studies, ordering and review of radiographic studies, pulse oximetry, re-evaluation of patient's condition. This critical care time did not overlap with that of any other provider or involve time for any procedures.     Margie Medley MD  Critical Care Medicine  [facility]

## 2024-05-28 NOTE — PROGRESS NOTES
[facility]  Adult Nutrition  First Assessment Note         Reason for Assessment  Reason For Assessment: identified at risk by screening criteria (MST2)   Nutrition Risk Screen: no indicators present    Assessment and Plan  Patient is an 83y male admitted 5/27 for hypertensive emergency and aspiration pneumonia. He was identified at risk by screening criteria with MST2.     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 Chart review reveals very little recent weight history. Patient is noted to have moderate fat and muscle depletion to temple, orbital, buccal, and clavicle regions.     Per ASPEN guidelines, patient meets criteria for moderate protein-calorie malnutrition secondary to chronic illness (hx CVA) as evidenced by moderate fat and muscle depletion to temple, orbital, buccal, and clavicle regions.     Discussed patient with nursing this AM during IDT rounds. Patient is currently NPO after failed bedside swallow eval performed by nursing. SLP evaluation has been ordered.     Recommend consider alternate route of nutrition if unable to advance PO diet x 2-3 days.    Last BM 5/27 per flowsheet.    Medications/labs reviewed. RD following.          Learning Needs/Social Determinants of Health  Learning Assessment       05/27/2024 1945 [facility] (5/27/2024 - Present)   Created by Terry Ferrer, RN - RN (Nurse) Status: Complete                 PRIMARY LEARNER     Primary Learner Name:  claude patrick RB - 05/27/2024 1945    Relationship:  Patient RB - 05/27/2024 1945    Does the primary learner have any barriers to learning?:  No Barriers RB - 05/27/2024 1945    What is the preferred language of the primary learner?:  English RB - 05/27/2024 1945    Is an  required?:  No RB - 05/27/2024 1945    How does the primary learner prefer to learn new concepts?:  Listening RB - 05/27/2024 1945    How often do you need to have someone help you read  instructions, pamphlets, or written material from your doctor or pharmacy?:  Never RB - 05/27/2024 1945        CO-LEARNER #1     No question answered        CO-LEARNER #2     No question answered        SPECIAL TOPICS     No question answered        ANSWERED BY:     No question answered        Comments         Edit History       Terry Ferrer, RN - RN (Nurse)   05/27/2024 1945                           Social Determinants of Health     Tobacco Use: Low Risk  (10/3/2022)    Received from [facility]    Patient History     Smoking Tobacco Use: Never     Smokeless Tobacco Use: Never     Passive Exposure: Not on file   Alcohol Use: Not At Risk (5/1/2024)    AUDIT-C     Frequency of Alcohol Consumption: Never     Average Number of Drinks: Patient does not drink     Frequency of Binge Drinking: Never   Financial Resource Strain: Low Risk  (5/1/2024)    Overall Financial Resource Strain (CARDIA)     Difficulty of Paying Living Expenses: Not hard at all   Food Insecurity: No Food Insecurity (5/1/2024)    Hunger Vital Sign     Worried About Running Out of Food in the Last Year: Never true     Ran Out of Food in the Last Year: Never true   Transportation Needs: No Transportation Needs (5/1/2024)    PRAPARE - Transportation     Lack of Transportation (Medical): No     Lack of Transportation (Non-Medical): No   Physical Activity: Inactive (5/1/2024)    Exercise Vital Sign     Days of Exercise per Week: 0 days     Minutes of Exercise per Session: 0 min   Stress: No Stress Concern Present (5/1/2024)    [employer] - Occupational Stress Questionnaire     Feeling of Stress : Not at all   Housing Stability: Low Risk  (5/1/2024)    Housing Stability Vital Sign     Unable to Pay for Housing in the Last Year: No     Homeless in the Last Year: No   Depression: Not on file   Utilities: Not on file   Health Literacy: Not on file   Social Isolation: Not on file             Malnutrition  Is Patient Malnourished: Yes Malnutrition Assessment  Malnutrition Context: chronic illness  Malnutrition Level: moderate          Energy Intake (Malnutrition): less than or equal to 50% for greater than or equal to 1 month  Subcutaneous Fat (Malnutrition): moderate depletion  Muscle Mass (Malnutrition): moderate depletion   Orbital Region (Subcutaneous Fat Loss): moderate depletion   [place] Region (Muscle Loss): moderate depletion  Clavicle Bone Region (Muscle Loss): moderate depletion                 Nutrition Diagnosis  Malnutrition (Moderate) related to Decreased ability to consume sufficient energy as evidenced by hx CVA, failed beside swallow evaluation, moderate fat and muscle depletion to orbital, buccal, temple, and clavicle regions  Comments: consider alternate route of nutrition    Recent Labs   Lab 05/27/24  1713 05/27/24  1746 05/28/24  0518   GLU 90  --   --    POCGLU  --    < > 104    < > = values in this interval not displayed.         Nutrition Prescription / Recommendations  Recommendation/Intervention: Recommend advance diet per SLP recommendations. If unable to advance PO diet, recommend consider alternate route of nutrition.  Goals: Diet advancement or alternate route of nutrition x 2-3 days, weight maintenance during admission  Nutrition Goal Status: new  Current Diet Order: NPO  Chewing or Swallowing Difficulty?: Swallowing difficulty  Recommended Diet: Enteral Nutrition  Recommended Oral Supplement: No Oral Supplements  Is Nutrition Support Recommended: [facility] Support: No  Is Nutrition Education Recommended: No    Monitor and Evaluation  % current Intake: NPO  % intake to meet estimated needs: Enteral Nutrition   Food and Nutrient Intake: food and beverage intake, enteral nutrition intake  Food and Nutrient Adminstration: diet order, enteral and parenteral nutrition administration  Anthropometric Measurements: weight, weight change  Biochemical Data,  Medical Tests and Procedures: electrolyte and renal panel, gastrointestinal profile, glucose/endocrine profile, inflammatory profile, lipid profile       Current Medical Diagnosis and Past Medical History     Past Medical History:   Diagnosis Date    Depression     Hemiplegia and hemiparesis following cerebral infarction affecting right dominant side     Hypertension     Seizures     Stroke     Vascular dementia        Nutrition/Diet History       Lab/Procedures/Meds  Recent Labs   Lab 05/27/24  1110 05/27/24  1713    148*   K 5.4* 5.0   BUN 57* 57*   CREATININE 1.82* 1.98*   CALCIUM 8.4* 8.4*   ALBUMIN 2.4*  --    * 122*   ALT 81*  --    AST 50*  --    Note: Na+, Cl- elevated. BUN/Cr elevated. PMH DENYS, Ca++ low    Last A1c:   Lab Results   Component Value Date    HGBA1C 5.7 07/14/2020     Lab Results   Component Value Date    RBC 4.20 (L) 05/28/2024    HGB 11.6 (L) 05/28/2024    HCT 39.8 (L) 05/28/2024    MCV 94.8 05/28/2024    MCH 27.6 05/28/2024    MCHC 29.1 (L) 05/28/2024   Note: H&H low    Pertinent Labs Reviewed: reviewed  Pertinent Medications Reviewed: reviewed  Scheduled Meds:   albuterol-ipratropium  3 mL Nebulization Q8H    aspirin  81 mg Oral Daily    clopidogreL  75 mg Oral Daily    diltiaZEM  5 mg Intravenous Q12H    furosemide (LASIX) injection  80 mg Intravenous Daily    heparin (porcine)  5,000 Units Subcutaneous Q8H    levETIRAcetam (Keppra) IV (PEDS and ADULTS)  500 mg Intravenous Q12H    mirtazapine  15 mg Oral QHS    mupirocin   Nasal BID    pantoprazole  40 mg Intravenous Daily    pravastatin  80 mg Oral QHS     Continuous Infusions:   nitroGLYCERIN  0-400 mcg/min Intravenous Continuous 9 mL/hr at 05/28/24 1000 30 mcg/min at 05/28/24 1000     PRN Meds:.  Current Facility-Administered Medications:     acetaminophen, 650 mg, Oral, Q6H PRN    dextrose 10%, 12.5 g, Intravenous, PRN    dextrose 10%, 25 g, Intravenous, PRN    glucagon (human recombinant), 1 mg, Intramuscular, PRN     ondansetron, 4 mg, Intravenous, Q8H PRN    senna, 8.6 mg, Oral, Daily PRN    sodium chloride 0.9%, 10 mL, Intravenous, PRN    Anthropometrics  Temp: 98.4 °F (36.9 °C)  Height Method: Estimated  Height: 6' (182.9 cm)  Height (inches): 72 in  Weight Method: Bed Scale  Weight: 75.2 kg (165 lb 12.6 oz)  Weight (lb): 165.79 lb  Ideal Body Weight (IBW), Male: 178 lb  % Ideal Body Weight, Male (lb): 92.65 %  BMI (Calculated): 22.5       Estimated/Assessed Needs  RMR ([place]-St. Jeor Equation): 1495     Temp: 98.4 °F (36.9 °C)Axillary  Weight Used For Calorie Calculations: 75.2 kg (165 lb 12.6 oz)     Energy Calorie Requirements (kcal): 1880-2256kcal (25-30kcal/kg)  Weight Used For Protein Calculations: 75.2 kg (165 lb 12.6 oz)  Protein Requirements: 75-90g (1.0-1.2g/kg)       RDA Method (mL): 1880       Nutrition by Nursing  Diet/Nutrition Received: NPO           Last Bowel Movement: 05/27/24                Nutrition Follow-Up  RD Follow-up?: Yes      Nutrition Discharge Planning: Unsure of discharge plans at this time. Will monitor and assess closer to discharge.          Alysha Lyon MS, RD, LD  Available via Secure Chat

## 2024-05-28 NOTE — PROGRESS NOTES
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     History was obtained from records as patient is a poor historian at baseline.  Patient presented from his nursing home with acute respiratory distress.  There was concern for an aspiration event.  On presentation to the ED he was noted to be hypoxic with SpO2 93%   Associated with increased work of breathing.  A chest x-ray demonstrated hazy opacification of bilateral lungs. He was recently admitted 04/30-05/03 with hypernatremia 2/2 hypovolemia which was attributed to his decreased PO intake.   ED course with patient receiving vancomycin IV x1, Zosyn IV x1 and NS IV x1L. On my assessment, recommended diuresis with Lasix IV, NIV settings were changed and started him on a nitro glycerin drip.          Hospital/ICU Course:  No notes on file    Interval History/Significant Events: Received amio gtt and heparin overnight for Afib, net positive, LE duplex negative    Review of Systems  Objective:     Vital Signs (Most Recent):  Temp: 97.8 °F (36.6 °C) (05/28/24 1115)  Pulse: 87 (05/28/24 1515)  Resp: (!) 9 (05/28/24 1515)  BP: 133/73 (05/28/24 1515)  SpO2: 99 % (05/28/24 1515) Vital Signs (24h Range):  Temp:  [97.6 °F (36.4 °C)-98.5 °F (36.9 °C)] 97.8 °F (36.6 °C)  Pulse:  [] 87  Resp:  [9-23] 9  SpO2:  [88 %-100 %] 99 %  BP: ()/()  133/73   Weight: 75.2 kg (165 lb 12.6 oz)  Body mass index is 22.48 kg/m².      Intake/Output Summary (Last 24 hours) at 5/28/2024 1532  Last data filed at 5/28/2024 1300  Gross per 24 hour   Intake 651.98 ml   Output 585 ml   Net 66.98 ml          Physical Exam  Constitutional:       General: He is not in acute distress.     Appearance: He is ill-appearing.   HENT:      Head: Normocephalic and atraumatic.   Eyes:      General: No scleral icterus.  Cardiovascular:      Rate and Rhythm: Tachycardia present. Rhythm irregular.      Heart sounds: Murmur heard.   Pulmonary:      Effort: Pulmonary effort is normal.      Breath sounds: Wheezing present.   Abdominal:      Palpations: Abdomen is soft.      Tenderness: There is no abdominal tenderness. There is no guarding.   Musculoskeletal:      Right lower leg: Edema present.      Left lower leg: Edema present.   Skin:     General: Skin is warm.      Coloration: Skin is not jaundiced.   Neurological:      Mental Status: He is alert. Mental status is at baseline.            Vents:  Oxygen Concentration (%): 30 (05/28/24 0411)  Lines/Drains/Airways       Drain  Duration                  Urethral Catheter 05/27/24 1830 Coude 16 Fr. <1 day              Peripheral Intravenous Line  Duration                  Peripheral IV - Single Lumen 05/27/24 1012 20 G Anterior;Distal;Right Forearm 1 day         Peripheral IV - Single Lumen 05/27/24 20 G Left;Posterior Forearm 1 day         Peripheral IV - Single Lumen 05/28/24 0715 20 G;2 in;1/2 in Anterior;Left Forearm <1 day                  Significant Labs:    CBC/Anemia Profile:  Recent Labs   Lab 05/27/24  1007 05/27/24  1011 05/27/24  2312 05/28/24  0528   WBC 10.63  --  16.07* 14.20*   HGB 12.3*  --  11.2* 11.6*   HCT 43.8 39 37.6* 39.8*     --  176 161   MCV 98.4*  --  93.3 94.8   RDW 18.5*  --  17.7* 17.9*        Chemistries:  Recent Labs   Lab 05/27/24  1110 05/27/24  1713 05/28/24  1423    148* 144   K 5.4* 5.0 5.2*    * 122* 120*   CO2 18* 21 17*   BUN 57* 57* 57*   CREATININE 1.82* 1.98* 2.15*   CALCIUM 8.4* 8.4* 8.7   ALBUMIN 2.4*  --  2.2*   PROT 6.6  --  6.0*   BILITOT 0.5  --  0.3   ALKPHOS 92  --  82   ALT 81*  --  68*   AST 50*  --  43*   MG 2.7*  --   --        All pertinent labs within the past 24 hours have been reviewed.    Significant Imaging:  I have reviewed all pertinent imaging results/findings within the past 24 hours.    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  Does not ambulate at baseline.  On Plavix and aspirin.  Presenting in hypertensive emergency. CT head negative for acute intracranial process. Appears to be at baseline 05/28.  - wife updated 05/28  - cont ASA and Plavix    -- SLP evaluation completed    Pulmonary  Acute respiratory failure with hypoxia  CXR with diffuse interstitial opacities consistent with pulmonary edema. VBG reviewed with  mild metabolic acidosis and no hypercapnia.   Placed on BPAP with good volume.   - BPAP as needed for increased work of breathing  - supplement for goal SpO2 >92%  - diuresis as per hypertensive emergency plan  - most likely a cardiac wheeze given imaging; will treat with scheduled DuoNebs x48 hrs    Cardiac/Vascular  * Hypertensive emergency  Presenting with pulmonary edema a/w respiratory distress. Markedly elevated BP with signs concerning for heart failure now found to have NTproBNP highest to date. Unclear cardiac history with no baseline TTE. EKG with Afib with RVR and Tn peaked on first draw  - cont nitroglycerin gtt for goal SBP <140  - start lasix IV with gtt  - Tn peaked on first draw  - TTE is notable for  LVEF 50-55%, mild RV enlargement,  severe MR and PASP 18  - overall low suspicion for sepsis at this time. Will stop Abx. Monitor fever curve and f/u Cxs (BCx obtained, UA pending).    Severe mitral regurgitation  No baseline TTE in this patient presenting with hypertensive emergency and flash pulmonary edema now found to have severe MR with normal LA size and PASP. Concern for acute MR of unclear etiology. Will obtain Cardiology consult for evaluation.   - start Lasix IV bolus with gtt  - monitor Is/Os  - Cardiology consulted, f/u recs    Renal/  Hyperkalemia  Hyperkalemia with renal function at baseline (SCr 1.82 with prior baseline 2-2.7). EKG pending.   - repeat BMP in PM and then daily  - treated with IV lasix        Critical Care Medicine Daily Checklist:05/28/2024   F: Feeding NPO, SLP evaluation/re-evaluation pending   A: Analgesia   N/A   S: Sedation N/A N/A           T: Thromboprophylaxis Lower extremity SCD and Heparin SQ   H: HOB > 300 Yes.   U: Stress Ulcer Prophylaxis N/A   G: Glucose Control At goal (140-180 mg/dL).   S: SAT & SBT Coordinated?  N/A   B: Bowel Regimen Yes in last 24hrs.   I: Indwelling Catheter Reviewed Maintain: Mendoza Catheter  Remove: N/A   D: De-escalation of Antimicrobials N/A    Disposition ICU         Critical Care Time: 50 minutes  Critical secondary to Patient has a condition that poses threat to life and bodily function: Acute heart failure      Critical care was time spent personally by me on the following activities: development of treatment plan with patient or surrogate and bedside caregivers, discussions with consultants, evaluation of patient's response to treatment, examination of patient, ordering and performing treatments and interventions, ordering and review of laboratory studies, ordering and review of radiographic studies, pulse oximetry, re-evaluation of patient's condition. This critical care time did not overlap with that of any other provider or involve time for any procedures.     Margie Medley,  MD  Critical Care Medicine  [facility]

## 2024-05-28 NOTE — PLAN OF CARE
[facility] ICU  Initial Discharge Assessment       Primary Care Provider: No, Primary Doctor    Admission Diagnosis: Malignant hypertension [I10]  Hypertensive crisis [I16.9]  Tachycardia [R00.0]  Respiratory failure, unspecified chronicity, unspecified whether with hypoxia or hypercapnia [J96.90]  Acute congestive heart failure, unspecified heart failure type [I50.9]    Admission Date: 5/27/2024  Expected Discharge Date: 6/11/2024    Transition of Care Barriers: None    Payor: MEDICARE / Plan: MEDICARE PART A & B / Product Type: Government /     Extended Emergency Contact Information  Primary Emergency Contact: LACEY GUADARRAMA  Mobile Phone: [phone]  Relation: Spouse  Preferred language: English   needed? No    Discharge Plan A: Return to nursing home  Discharge Plan B: Return to Nursing Home      [facility] - [place], [place] - [address]  [address]  [place] [address]  Phone: [phone] Fax: [phone]      Initial Assessment (most recent)       Adult Discharge Assessment - 05/28/24 1233          Discharge Assessment    Assessment Type Discharge Planning Assessment     Confirmed/corrected address, phone number and insurance Yes     Source of Information family     If unable to respond/provide information was family/caregiver contacted? Yes     Contact Name/Number LACEY GUADARRAMA (Spouse)  [phone]     Communicated SHAYY with patient/caregiver Date not available/Unable to determine     Reason For Admission Hypertensive emergency     Facility Arrived From: [facility]     Do you expect to return to your current living situation? Yes     Do you have help at home or someone to help you manage your care at home? --   Pt resident of [facility]    Prior to hospitilization cognitive status: Unable to Assess     Current cognitive status: Unable to Assess     Walking or Climbing Stairs Difficulty no   Whelchair/Chair bound at NH    Dressing/Bathing  Difficulty no   Whelchair/Chair bound at NH    Home Accessibility other (see comments)   at [facility]    Equipment Currently Used at Home none     Readmission within 30 days? No     Do you currently have service(s) that help you manage your care at home? No     Do you take prescription medications? Yes     Do you have prescription coverage? Yes     Do you have any problems affording any of your prescribed medications? No     Is the patient taking medications as prescribed? yes     How do you get to doctors appointments? --   Transportation at [facility]    Are you on dialysis? No     Do you take coumadin? No     Discharge Plan A Return to nursing home     Discharge Plan B Return to Nursing Home     DME Needed Upon Discharge  none     Discharge Plan discussed with: Spouse/sig other     Name(s) and Number(s) LACEY GUADARRAMA (Spouse)  [phone]     Transition of Care Barriers None        Physical Activity    On average, how many days per week do you engage in moderate to strenuous exercise (like a brisk walk)? 0 days     On average, how many minutes do you engage in exercise at this level? 0 min        Financial Resource Strain    How hard is it for you to pay for the very basics like food, housing, medical care, and heating? Patient unable to answer        Housing Stability    In the last 12 months, was there a time when you were not able to pay the mortgage or rent on time? Patient unable to answer     At any time in the past 12 months, were you homeless or living in a shelter (including now)? Patient unable to answer        Transportation Needs    Has the lack of transportation kept you from medical appointments, meetings, work or from getting things needed for daily living? No        Food Insecurity    Within the past 12 months, you worried that your food would run out before you got the money to buy more. Patient unable to answer     Within the past 12 months, the food you bought just didn't last and you  didn't have money to get more. Patient unable to answer        Stress    Do you feel stress - tense, restless, nervous, or anxious, or unable to sleep at night because your mind is troubled all the time - these days? Patient unable to answer        Social Isolation    How often do you feel lonely or isolated from those around you?  Patient unable to answer        Alcohol Use    Q1: How often do you have a drink containing alcohol? Never     Q2: How many drinks containing alcohol do you have on a typical day when you are drinking? Patient does not drink     Q3: How often do you have six or more drinks on one occasion? Never        Utilities    In the past 12 months has the electric, gas, oil, or water company threatened to shut off services in your home? Patient unable to answer        Health Literacy    How often do you need to have someone help you when you read instructions, pamphlets, or other written material from your doctor or pharmacy? Patient unable to respond                   SS spoke with spouse, Margareth Ambriz [phone], via phone. Pt is a resident at [facility]. Pt is wheelchair/chair bound and dependant with all ADLs. Plan is to return back to [facility] when medically ready. SS  following.

## 2024-05-29 PROBLEM — I48.91 ATRIAL FIBRILLATION WITH RAPID VENTRICULAR RESPONSE: Status: ACTIVE | Noted: 2024-05-29

## 2024-05-29 PROBLEM — I50.33 ACUTE ON CHRONIC DIASTOLIC HEART FAILURE: Status: ACTIVE | Noted: 2024-05-27

## 2024-05-29 PROBLEM — I50.31 ACUTE DIASTOLIC HEART FAILURE: Status: ACTIVE | Noted: 2024-05-27

## 2024-05-29 LAB
ALBUMIN SERPL BCP-MCNC: 2.5 G/DL (ref 3.5–5)
ALBUMIN/GLOB SERPL: 0.6 {RATIO}
ALP SERPL-CCNC: 87 U/L (ref 45–115)
ALT SERPL W P-5'-P-CCNC: 67 U/L (ref 16–61)
ANION GAP SERPL CALCULATED.3IONS-SCNC: 11 MMOL/L (ref 7–16)
APTT PPP: 36 SECONDS (ref 25.2–37.3)
AST SERPL W P-5'-P-CCNC: 46 U/L (ref 15–37)
BILIRUB SERPL-MCNC: 0.4 MG/DL (ref ?–1.2)
BUN SERPL-MCNC: 63 MG/DL (ref 7–18)
BUN/CREAT SERPL: 27 (ref 6–20)
CALCIUM SERPL-MCNC: 8.8 MG/DL (ref 8.5–10.1)
CHLORIDE SERPL-SCNC: 119 MMOL/L (ref 98–107)
CK SERPL-CCNC: 104 U/L (ref 39–308)
CO2 SERPL-SCNC: 19 MMOL/L (ref 21–32)
CREAT SERPL-MCNC: 2.33 MG/DL (ref 0.7–1.3)
EGFR (NO RACE VARIABLE) (RUSH/TITUS): 27 ML/MIN/1.73M2
GLOBULIN SER-MCNC: 4 G/DL (ref 2–4)
GLUCOSE SERPL-MCNC: 72 MG/DL (ref 70–105)
GLUCOSE SERPL-MCNC: 83 MG/DL (ref 74–106)
GLUCOSE SERPL-MCNC: 85 MG/DL (ref 70–105)
GLUCOSE SERPL-MCNC: 86 MG/DL (ref 70–105)
POTASSIUM SERPL-SCNC: 5 MMOL/L (ref 3.5–5.1)
PROT SERPL-MCNC: 6.5 G/DL (ref 6.4–8.2)
SODIUM SERPL-SCNC: 144 MMOL/L (ref 136–145)

## 2024-05-29 PROCEDURE — 25000003 PHARM REV CODE 250: Performed by: HOSPITALIST

## 2024-05-29 PROCEDURE — 25000003 PHARM REV CODE 250: Performed by: NURSE PRACTITIONER

## 2024-05-29 PROCEDURE — 94660 CPAP INITIATION&MGMT: CPT

## 2024-05-29 PROCEDURE — 25000003 PHARM REV CODE 250: Performed by: STUDENT IN AN ORGANIZED HEALTH CARE EDUCATION/TRAINING PROGRAM

## 2024-05-29 PROCEDURE — 25000242 PHARM REV CODE 250 ALT 637 W/ HCPCS: Performed by: STUDENT IN AN ORGANIZED HEALTH CARE EDUCATION/TRAINING PROGRAM

## 2024-05-29 PROCEDURE — 27000221 HC OXYGEN, UP TO 24 HOURS

## 2024-05-29 PROCEDURE — 80053 COMPREHEN METABOLIC PANEL: CPT | Performed by: STUDENT IN AN ORGANIZED HEALTH CARE EDUCATION/TRAINING PROGRAM

## 2024-05-29 PROCEDURE — C9113 INJ PANTOPRAZOLE SODIUM, VIA: HCPCS | Performed by: STUDENT IN AN ORGANIZED HEALTH CARE EDUCATION/TRAINING PROGRAM

## 2024-05-29 PROCEDURE — 36415 COLL VENOUS BLD VENIPUNCTURE: CPT | Performed by: HOSPITALIST

## 2024-05-29 PROCEDURE — 82962 GLUCOSE BLOOD TEST: CPT

## 2024-05-29 PROCEDURE — 20000000 HC ICU ROOM

## 2024-05-29 PROCEDURE — 99900035 HC TECH TIME PER 15 MIN (STAT)

## 2024-05-29 PROCEDURE — 94640 AIRWAY INHALATION TREATMENT: CPT

## 2024-05-29 PROCEDURE — 63600175 PHARM REV CODE 636 W HCPCS: Performed by: STUDENT IN AN ORGANIZED HEALTH CARE EDUCATION/TRAINING PROGRAM

## 2024-05-29 PROCEDURE — 99233 SBSQ HOSP IP/OBS HIGH 50: CPT | Mod: ,,, | Performed by: NURSE PRACTITIONER

## 2024-05-29 PROCEDURE — 99291 CRITICAL CARE FIRST HOUR: CPT | Mod: ,,, | Performed by: STUDENT IN AN ORGANIZED HEALTH CARE EDUCATION/TRAINING PROGRAM

## 2024-05-29 PROCEDURE — 94761 N-INVAS EAR/PLS OXIMETRY MLT: CPT

## 2024-05-29 PROCEDURE — 82550 ASSAY OF CK (CPK): CPT | Performed by: STUDENT IN AN ORGANIZED HEALTH CARE EDUCATION/TRAINING PROGRAM

## 2024-05-29 PROCEDURE — 36415 COLL VENOUS BLD VENIPUNCTURE: CPT | Performed by: STUDENT IN AN ORGANIZED HEALTH CARE EDUCATION/TRAINING PROGRAM

## 2024-05-29 PROCEDURE — 85730 THROMBOPLASTIN TIME PARTIAL: CPT | Performed by: STUDENT IN AN ORGANIZED HEALTH CARE EDUCATION/TRAINING PROGRAM

## 2024-05-29 RX ORDER — FUROSEMIDE 40 MG/1
40 TABLET ORAL DAILY
Status: DISCONTINUED | OUTPATIENT
Start: 2024-05-30 | End: 2024-05-30

## 2024-05-29 RX ORDER — PANTOPRAZOLE SODIUM 40 MG/1
40 FOR SUSPENSION ORAL DAILY
Status: DISCONTINUED | OUTPATIENT
Start: 2024-05-29 | End: 2024-06-05 | Stop reason: HOSPADM

## 2024-05-29 RX ORDER — CARVEDILOL 6.25 MG/1
6.25 TABLET ORAL 2 TIMES DAILY
Status: DISCONTINUED | OUTPATIENT
Start: 2024-05-29 | End: 2024-05-31

## 2024-05-29 RX ORDER — CARVEDILOL 3.12 MG/1
3.12 TABLET ORAL ONCE
Status: COMPLETED | OUTPATIENT
Start: 2024-05-29 | End: 2024-05-29

## 2024-05-29 RX ORDER — METOPROLOL TARTRATE 1 MG/ML
5 INJECTION, SOLUTION INTRAVENOUS ONCE
Status: COMPLETED | OUTPATIENT
Start: 2024-05-29 | End: 2024-05-29

## 2024-05-29 RX ADMIN — CARVEDILOL 3.12 MG: 3.12 TABLET, FILM COATED ORAL at 10:05

## 2024-05-29 RX ADMIN — MUPIROCIN: 20 OINTMENT TOPICAL at 08:05

## 2024-05-29 RX ADMIN — HYDRALAZINE HYDROCHLORIDE: 10 TABLET, FILM COATED ORAL at 03:05

## 2024-05-29 RX ADMIN — HYDRALAZINE HYDROCHLORIDE: 10 TABLET, FILM COATED ORAL at 08:05

## 2024-05-29 RX ADMIN — CARVEDILOL 6.25 MG: 6.25 TABLET, FILM COATED ORAL at 09:05

## 2024-05-29 RX ADMIN — MUPIROCIN: 20 OINTMENT TOPICAL at 09:05

## 2024-05-29 RX ADMIN — CLOPIDOGREL BISULFATE 75 MG: 75 TABLET ORAL at 08:05

## 2024-05-29 RX ADMIN — PRAVASTATIN SODIUM 80 MG: 40 TABLET ORAL at 09:05

## 2024-05-29 RX ADMIN — MIRTAZAPINE 15 MG: 15 TABLET, FILM COATED ORAL at 09:05

## 2024-05-29 RX ADMIN — HYDRALAZINE HYDROCHLORIDE: 10 TABLET, FILM COATED ORAL at 09:05

## 2024-05-29 RX ADMIN — IPRATROPIUM BROMIDE AND ALBUTEROL SULFATE 3 ML: 2.5; .5 SOLUTION RESPIRATORY (INHALATION) at 02:05

## 2024-05-29 RX ADMIN — IPRATROPIUM BROMIDE AND ALBUTEROL SULFATE 3 ML: 2.5; .5 SOLUTION RESPIRATORY (INHALATION) at 12:05

## 2024-05-29 RX ADMIN — APIXABAN 2.5 MG: 2.5 TABLET, FILM COATED ORAL at 09:05

## 2024-05-29 RX ADMIN — METOPROLOL TARTRATE 5 MG: 1 INJECTION, SOLUTION INTRAVENOUS at 05:05

## 2024-05-29 RX ADMIN — LEVETIRACETAM 500 MG: 500 SOLUTION ORAL at 08:05

## 2024-05-29 RX ADMIN — LEVETIRACETAM 500 MG: 500 SOLUTION ORAL at 09:05

## 2024-05-29 RX ADMIN — CARVEDILOL 3.12 MG: 3.12 TABLET, FILM COATED ORAL at 08:05

## 2024-05-29 RX ADMIN — PANTOPRAZOLE SODIUM 40 MG: 40 INJECTION, POWDER, LYOPHILIZED, FOR SOLUTION INTRAVENOUS at 08:05

## 2024-05-29 RX ADMIN — ASPIRIN 81 MG CHEWABLE TABLET 81 MG: 81 TABLET CHEWABLE at 08:05

## 2024-05-29 RX ADMIN — IPRATROPIUM BROMIDE AND ALBUTEROL SULFATE 3 ML: 2.5; .5 SOLUTION RESPIRATORY (INHALATION) at 07:05

## 2024-05-29 NOTE — ASSESSMENT & PLAN NOTE
5/29/2024:  - Coreg increased today for better rate control  - Hx of CVA, will start Eliquis 2.5mg BID today for stroke prophylaxis (discontinuing asa, pt on plavix)

## 2024-05-29 NOTE — PLAN OF CARE
Problem: Wound  Goal: Optimal Functional Ability  Outcome: Progressing  Intervention: Optimize Functional Ability  Flowsheets (Taken 5/28/2024 1927)  Activity Management: Rolling - L1     Problem: Infection  Goal: Absence of Infection Signs and Symptoms  Outcome: Progressing  Intervention: Prevent or Manage Infection  Flowsheets (Taken 5/28/2024 1927)  Fever Reduction/Comfort Measures: lightweight bedding

## 2024-05-29 NOTE — ASSESSMENT & PLAN NOTE
- Patient seen and evaluated by Dr. Kauffman  - Echo with EF 50-55%, severe MR  - Agree with diuresis, afterload reduction added   - Continue monitoring    5/29/2024:  - Has been transitioned to PO lasix by primary team  - Continue BIDIL, Coreg

## 2024-05-29 NOTE — SUBJECTIVE & OBJECTIVE
Interval History: Patient seen today, now afib rvr on tele, coreg increased this morning. Creatinine bumped to 2.33 and IV lasix discontinued this morning and starting PO lasix tomorrow.     Review of Systems   Reason unable to perform ROS: unable to obtain, aphasia from previous CVA.     Objective:     Vital Signs (Most Recent):  Temp: 98.2 °F (36.8 °C) (05/29/24 1515)  Pulse: (!) 129 (05/29/24 1809)  Resp: 11 (05/29/24 1809)  BP: 111/74 (05/29/24 1800)  SpO2: 100 % (05/29/24 1800) Vital Signs (24h Range):  Temp:  [98.1 °F (36.7 °C)-98.8 °F (37.1 °C)] 98.2 °F (36.8 °C)  Pulse:  [] 129  Resp:  [10-28] 11  SpO2:  [67 %-100 %] 100 %  BP: ()/() 111/74     Weight: 69.2 kg (152 lb 8.9 oz)  Body mass index is 20.69 kg/m².     SpO2: 100 %         Intake/Output Summary (Last 24 hours) at 5/29/2024 1830  Last data filed at 5/29/2024 1809  Gross per 24 hour   Intake 108.07 ml   Output 1820 ml   Net -1711.93 ml       Lines/Drains/Airways       Drain  Duration                  Urethral Catheter 05/27/24 1830 Coude 16 Fr. 2 days              Peripheral Intravenous Line  Duration                  Peripheral IV - Single Lumen 05/27/24 1012 20 G Anterior;Distal;Right Forearm 2 days         Peripheral IV - Single Lumen 05/28/24 0715 20 G;2 in;1/2 in Anterior;Left Forearm 1 day                       Physical Exam  Vitals reviewed.   Constitutional:       General: He is not in acute distress.  Cardiovascular:      Rate and Rhythm: Normal rate and regular rhythm.      Heart sounds: Murmur heard.   Pulmonary:      Effort: Pulmonary effort is normal.      Breath sounds: Wheezing present.   Abdominal:      General: Bowel sounds are normal.      Palpations: Abdomen is soft.   Musculoskeletal:      Right lower leg: Edema present.      Left lower leg: Edema present.   Skin:     General: Skin is warm and dry.   Neurological:      Mental Status: He is alert. Mental status is at baseline.            Significant Labs: ABG: No  "results for input(s): "PH", "PCO2", "HCO3", "POCSATURATED", "BE" in the last 48 hours., Blood Culture: No results for input(s): "LABBLOO" in the last 48 hours., BMP:   Recent Labs   Lab 05/28/24  1423 05/29/24  0313   GLU 90 83    144   K 5.2* 5.0   * 119*   CO2 17* 19*   BUN 57* 63*   CREATININE 2.15* 2.33*   CALCIUM 8.7 8.8   , CMP   Recent Labs   Lab 05/28/24  1423 05/29/24  0313    144   K 5.2* 5.0   * 119*   CO2 17* 19*   GLU 90 83   BUN 57* 63*   CREATININE 2.15* 2.33*   CALCIUM 8.7 8.8   PROT 6.0* 6.5   ALBUMIN 2.2* 2.5*   BILITOT 0.3 0.4   ALKPHOS 82 87   AST 43* 46*   ALT 68* 67*   ANIONGAP 12 11   , CBC   Recent Labs   Lab 05/27/24  2312 05/28/24  0528   WBC 16.07* 14.20*   HGB 11.2* 11.6*   HCT 37.6* 39.8*    161   , INR   Recent Labs   Lab 05/27/24  2313   INR 1.20   , Lipid Panel No results for input(s): "CHOL", "HDL", "LDLCALC", "TRIG", "CHOLHDL" in the last 48 hours., and Troponin No results for input(s): "TROPONINI" in the last 48 hours.    Significant Imaging: Echocardiogram: Transthoracic echo (TTE) complete (Cupid Only):   Results for orders placed or performed during the hospital encounter of 05/27/24   Echo   Result Value Ref Range    BSA 1.95 m2    EF 50 %    Est. RA pres 3 mmHg    LVIDd 4.03 3.5 - 6.0 cm    LVIDs 3.06 2.1 - 4.0 cm    IVS 1.88 (A) 0.6 - 1.1 cm    FS 24 (A) 28 - 44 %    Left Ventricle Relative Wall Thickness 0.78 cm    Posterior Wall 1.57 (A) 0.6 - 1.1 cm    LV mass 294.34 g    LV Mass Index 149 g/m2    RVDD 3.96 cm    RV/LV Ratio 0.98 cm    LA size 3.60 cm    RA Width 3.5 cm    TV Regurgitant Volume 15.0 cc    Ao root annulus 3.08 cm    IVC diameter 1.08 cm    ZLVIDS -1.02     ZLVIDD -3.40     LVOT stroke volume 50.47 cm3    LV Systolic Volume 36.85 mL    LV Systolic Volume Index 18.7 mL/m2    LV Diastolic Volume 71.13 mL    LV Diastolic Volume Index 36.11 mL/m2    LVOT diameter 1.98 cm    LVOT area 3.1 cm2    MV Peak E Asael 1.06 m/s    TDI LATERAL " 0.08 m/s    TDI SEPTAL 0.06 m/s    E/E' ratio 15.14 m/s    MV Peak A Asael 0.67 m/s    TR Max Asael 1.93 m/s    E/A ratio 1.58     E wave deceleration time 139.70 msec    LV SEPTAL E/E' RATIO 17.67 m/s    LV LATERAL E/E' RATIO 13.25 m/s    LVOT peak asael 0.84 m/s    Left Ventricular Outflow Tract Mean Velocity 0.54 cm/s    Left Ventricular Outflow Tract Mean Gradient 1.39 mmHg    TAPSE 1.69 cm    Left Atrium Major Axis 3.84 cm    RA Major Axis 3.52 cm    AV mean gradient 4 mmHg    AV peak gradient 5 mmHg    Ao peak asael 1.12 m/s    Ao VTI 20.80 cm    LVOT peak VTI 16.40 cm    AV valve area 2.43 cm²    AV Velocity Ratio 0.75     AV index (prosthetic) 0.79     MEAGAN by Velocity Ratio 2.31 cm²    Mr max asael 5.50 m/s    MV mean gradient 3 mmHg    MV peak gradient 8 mmHg    MV stenosis pressure 1/2 time 34.67 ms    MV valve area p 1/2 method 6.35 cm2    MV valve area by continuity eq 1.82 cm2    MV VTI 27.7 cm    TV resting pulmonary artery pressure 18 mmHg    RV TB RVSP 5 mmHg    Triscuspid Valve Regurgitation Peak Gradient 15 mmHg    PV PEAK VELOCITY 0.61 m/s    PV peak gradient 1 mmHg    Mean e' 0.07 m/s    AORTIC VALVE CUSP SEPERATION 2.00 cm    Narrative      Left Ventricle: The left ventricle is normal in size. Moderately   increased wall thickness. Regional wall motion abnormalities present.   Inferior wall hypokinesis There is low normal systolic function with a   visually estimated ejection fraction of 50 - 55%. Ejection fraction by   visual approximation is 50%.    Right Ventricle: Mild right ventricular enlargement.    Aortic Valve: The aortic valve is a trileaflet valve. There is mild   aortic valve sclerosis. Mildly calcified cusps.    Mitral Valve: There is mild bileaflet sclerosis. Mildly thickened   leaflets. There is severe regurgitation with a centrally directed jet.    IVC/SVC: Normal venous pressure at 3 mmHg.    Pericardium: There is a trivial effusion.

## 2024-05-29 NOTE — PROGRESS NOTES
[facility]  Critical Care Medicine  Progress Note    Patient Name: Claude Patrick  MRN: [id number]  Admission Date: 5/27/2024  Hospital Length of Stay: 2 days  Code Status: DNR  Attending Provider: Margie Medley MD  Primary Care Provider: Tita, Primary Doctor   Principal Problem: Hypertensive emergency    Subjective:     HPI:  84 yo M with CVA w/ expressive aphasia and residual R sided weakness, HTN, prostate cancer s/p prostatectomy (2004),  FTT, seizure disorder who resides at [facility] (since 10/2023) presented 05/27 with shortness of breath found to be in respiratory distress with admission to ICU for management of hypertensive emergency.     History was obtained from records as patient is a poor historian at baseline.  Patient presented from his nursing home with acute respiratory distress.  There was concern for an aspiration event.  On presentation to the ED he was noted to be hypoxic with SpO2 93%   Associated with increased work of breathing.  A chest x-ray demonstrated hazy opacification of bilateral lungs. He was recently admitted 04/30-05/03 with hypernatremia 2/2 hypovolemia which was attributed to his decreased PO intake.   ED course with patient receiving vancomycin IV x1, Zosyn IV x1 and NS IV x1L. On my assessment, recommended diuresis with Lasix IV, NIV settings were changed and started him on a nitro glycerin drip.          Hospital/ICU Course:  No notes on file    Interval History/Significant Events: improved UOP on gtt with increase SCr, afebrile, RVR persists and responding to BB    Review of Systems  Objective:     Vital Signs (Most Recent):  Temp: 98.1 °F (36.7 °C) (05/29/24 1115)  Pulse: (!) 122 (05/29/24 1300)  Resp: 11 (05/29/24 1300)  BP: (!) 121/97 (05/29/24 1300)  SpO2: 100 % (05/29/24 1230) Vital Signs (24h Range):  Temp:  [97.8 °F (36.6 °C)-98.8 °F (37.1 °C)] 98.1 °F (36.7 °C)  Pulse:  [] 122  Resp:  [9-28] 11  SpO2:  [67 %-100 %] 100 %  BP:  (107-164)/() 121/97   Weight: 69.2 kg (152 lb 8.9 oz)  Body mass index is 20.69 kg/m².      Intake/Output Summary (Last 24 hours) at 5/29/2024 1441  Last data filed at 5/29/2024 0900  Gross per 24 hour   Intake 146.12 ml   Output 1670 ml   Net -1523.88 ml          Physical Exam  Constitutional:       General: He is not in acute distress.     Appearance: He is ill-appearing.   HENT:      Head: Normocephalic and atraumatic.   Eyes:      General: No scleral icterus.  Cardiovascular:      Rate and Rhythm: Tachycardia present. Rhythm irregular.      Heart sounds: Murmur heard.   Pulmonary:      Effort: Pulmonary effort is normal.      Breath sounds: Wheezing present.   Abdominal:      Palpations: Abdomen is soft.      Tenderness: There is no abdominal tenderness. There is no guarding.   Musculoskeletal:      Right lower leg: Edema present.      Left lower leg: Edema present.   Skin:     General: Skin is warm.      Coloration: Skin is not jaundiced.   Neurological:      Mental Status: He is alert. Mental status is at baseline.            Vents:  Oxygen Concentration (%): 28 (05/29/24 0702)  Lines/Drains/Airways       Drain  Duration                  Urethral Catheter 05/27/24 1830 Coude 16 Fr. 1 day              Peripheral Intravenous Line  Duration                  Peripheral IV - Single Lumen 05/27/24 1012 20 G Anterior;Distal;Right Forearm 2 days         Peripheral IV - Single Lumen 05/27/24 20 G Left;Posterior Forearm 2 days         Peripheral IV - Single Lumen 05/28/24 0715 20 G;2 in;1/2 in Anterior;Left Forearm 1 day                  Significant Labs:    CBC/Anemia Profile:  Recent Labs   Lab 05/27/24  2312 05/28/24  0528   WBC 16.07* 14.20*   HGB 11.2* 11.6*   HCT 37.6* 39.8*    161   MCV 93.3 94.8   RDW 17.7* 17.9*        Chemistries:  Recent Labs   Lab 05/27/24  1713 05/28/24  1423 05/29/24  0313   * 144 144   K 5.0 5.2* 5.0   * 120* 119*   CO2 21 17* 19*   BUN 57* 57* 63*   CREATININE  1.98* 2.15* 2.33*   CALCIUM 8.4* 8.7 8.8   ALBUMIN  --  2.2* 2.5*   PROT  --  6.0* 6.5   BILITOT  --  0.3 0.4   ALKPHOS  --  82 87   ALT  --  68* 67*   AST  --  43* 46*       All pertinent labs within the past 24 hours have been reviewed.    Significant Imaging:  I have reviewed all pertinent imaging results/findings within the past 24 hours.    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  Does not ambulate at baseline.  On Plavix and aspirin.  Presenting in hypertensive emergency. CT head negative for acute intracranial process. Appears to be at baseline 05/28.  - wife updated 05/28  - cont Eliquis and Plavix; ASA d'cd    -- SLP evaluation completed    Pulmonary  Acute respiratory failure with hypoxia  CXR with diffuse interstitial opacities consistent with pulmonary edema. VBG reviewed with  mild metabolic acidosis and no hypercapnia.   Placed on BPAP with good volume.   - BPAP as needed for increased work of breathing  - supplement for goal SpO2 >92%  - diuresis as per hypertensive emergency plan  - most likely a cardiac wheeze given imaging; will treat with scheduled DuoNebs x48 hrs  - prednisone burst x5 days    Cardiac/Vascular  * Hypertensive emergency  Presenting with pulmonary edema a/w respiratory distress. Markedly elevated BP with signs concerning for heart failure now found to have NTproBNP highest to date. Unclear cardiac history with no baseline TTE. EKG with Afib with RVR and Tn peaked on first draw  - cont nitroglycerin gtt for goal SBP <140  - start lasix IV with gtt  - Tn peaked on first draw  - TTE is notable for  LVEF 50-55%, mild RV enlargement, severe MR and PASP 18  - overall low suspicion for  sepsis at this time. Will stop Abx. Monitor fever curve and f/u Cxs (BCx obtained, UA pending).    Atrial fibrillation with rapid ventricular response  Improving with Coreg. Start Eliquis.     Severe mitral regurgitation  No baseline TTE in this patient presenting with hypertensive emergency and flash pulmonary edema now found to have severe MR with normal LA size and PASP. Concern for acute MR of unclear etiology. Will obtain Cardiology consult for evaluation.   - stop Lasix gtt and transition to PO 05/30  - cont Coreg increased dose 6.25 mg Q12H  - afterload reduction with hydralazine  - transition to PO lasix  - monitor Is/Os  - Cardiology consulted, appreciate recs    -- 05/29: discussed with wife management of MR with options discussed including surgical referral vs interventional cards for management vs medical management; opted for medical management with new recurrence of hypertension; will follow up outpatient with repeat TTE to guide future treatment considerations    Acute diastolic heart failure  2/2 valvular heart disease and uncontrolled hypertension. Management as per MR and hypertensive emergency.     Renal/  Hyperkalemia  Hyperkalemia with renal function at baseline (SCr 1.82 with prior baseline 2-2.7). EKG pending.   - repeat BMP in PM and then daily  - treated with IV lasix        Critical Care Medicine Daily Checklist:05/29/2024   F: Feeding Diet ordered.   A: Analgesia   N/A   S: Sedation N/A N/A           T: Thromboprophylaxis Lower extremity SCD and On systemic anticoagulation with Eliquis   H: HOB > 300 Yes.   U: Stress Ulcer Prophylaxis N/A   G: Glucose Control At goal (140-180 mg/dL).   S: SAT & SBT Coordinated?  N/A   B: Bowel Regimen Yes in last 24hrs.   I: Indwelling Catheter Reviewed Maintain: Mendoza Catheter  Remove: N/A   D: De-escalation of Antimicrobials N/A    Disposition ICU       Critical Care Time: 45 minutes  Critical secondary to Patient has a condition that poses threat to  life and bodily function: Hypertensive emergency      Critical care was time spent personally by me on the following activities: development of treatment plan with patient or surrogate and bedside caregivers, discussions with consultants, evaluation of patient's response to treatment, examination of patient, ordering and performing treatments and interventions, ordering and review of laboratory studies, ordering and review of radiographic studies, pulse oximetry, re-evaluation of patient's condition. This critical care time did not overlap with that of any other provider or involve time for any procedures.     Margie Medley MD  Critical Care Medicine  [facility]

## 2024-05-29 NOTE — CONSULTS
[facility]  Cardiology  Consult Note    Patient Name: Claude Patrick  MRN: [id number]  Admission Date: 5/27/2024  Hospital Length of Stay: 1 days  Code Status: DNR   Attending Provider: Margie Medley MD   Consulting Provider: GLENROY Whitaker  Primary Care Physician: Tita, Primary Doctor  Principal Problem:Hypertensive emergency    Patient information was obtained from past medical records, ER records, and primary team.     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      History was obtained from records as patient is a poor historian at baseline.  Patient presented from his nursing home with acute respiratory distress.  There was concern for an aspiration event.  On presentation to the ED he was noted to be hypoxic with SpO2 93%   Associated with increased work of breathing.  A chest x-ray demonstrated hazy opacification of bilateral lungs. He was recently admitted 04/30-05/03 with hypernatremia 2/2 hypovolemia which was attributed to his decreased PO intake.     5/28/2024:  Cardiology consulted for severe MR, flash pulmonary edema. Patient seen today, awake and alert, but not offering any answers to yes/no questions. No family at bedside.       Past Medical History:   Diagnosis Date    Depression     Hemiplegia and hemiparesis following cerebral infarction affecting right dominant side     Hypertension     Seizures     Stroke     Vascular dementia        No past surgical  history on file.    Review of patient's allergies indicates:  No Known Allergies    No current facility-administered medications on file prior to encounter.     Current Outpatient Medications on File Prior to Encounter   Medication Sig    albuterol-ipratropium (DUO-NEB) 2.5 mg-0.5 mg/3 mL nebulizer solution Take 3 mLs by nebulization every 6 (six) hours as needed.    aspirin (ECOTRIN) 81 MG EC tablet Take 1 tablet by mouth once daily.    clopidogreL (PLAVIX) 75 mg tablet Take 75 mg by mouth once daily.    latanoprost 0.005 % ophthalmic solution Place 1 drop into both eyes every evening.    levETIRAcetam (KEPPRA) 500 MG Tab Take 500 mg by mouth 2 (two) times daily.    megestroL (MEGACE) 400 mg/10 mL (40 mg/mL) Susp Take 5 mLs by mouth once daily.    mirtazapine (REMERON) 15 MG tablet Take 15 mg by mouth every evening.    pantoprazole (PROTONIX) 40 mg suspension Take 40 mg by mouth once daily.    pravastatin (PRAVACHOL) 80 MG tablet Take 80 mg by mouth every evening.    acetaminophen (TYLENOL) 500 MG tablet Take 1,000 mg by mouth every 6 (six) hours as needed for Pain or Temperature greater than.     Family History    None       Tobacco Use    Smoking status: Unknown    Smokeless tobacco: Not on file   Substance and Sexual Activity    Alcohol use: Not Currently    Drug use: Not Currently    Sexual activity: Not on file     Review of Systems   Reason unable to perform ROS: unable to obtain, aphasia from previous CVA.     Objective:     Vital Signs (Most Recent):  Temp: 97.8 °F (36.6 °C) (05/28/24 1700)  Pulse: 79 (05/28/24 1830)  Resp: 18 (05/28/24 1830)  BP: (!) 109/53 (05/28/24 1830)  SpO2: 100 % (05/28/24 1830) Vital Signs (24h Range):  Temp:  [97.8 °F (36.6 °C)-98.5 °F (36.9 °C)] 97.8 °F (36.6 °C)  Pulse:  [] 79  Resp:  [9-19] 18  SpO2:  [89 %-100 %] 100 %  BP: ()/() 109/53     Weight: 75.2 kg (165 lb 12.6 oz)  Body mass index is 22.48 kg/m².    SpO2: 100 %         Intake/Output Summary (Last 24  "hours) at 5/28/2024 1843  Last data filed at 5/28/2024 1800  Gross per 24 hour   Intake 733.87 ml   Output 1710 ml   Net -976.13 ml       Lines/Drains/Airways       Drain  Duration                  Urethral Catheter 05/27/24 1830 Coude 16 Fr. 1 day              Peripheral Intravenous Line  Duration                  Peripheral IV - Single Lumen 05/27/24 1012 20 G Anterior;Distal;Right Forearm 1 day         Peripheral IV - Single Lumen 05/27/24 20 G Left;Posterior Forearm 1 day         Peripheral IV - Single Lumen 05/28/24 0715 20 G;2 in;1/2 in Anterior;Left Forearm <1 day                     Physical Exam  Vitals reviewed.   Constitutional:       General: He is not in acute distress.  Eyes:      General: No scleral icterus.  Cardiovascular:      Rate and Rhythm: Normal rate and regular rhythm.      Heart sounds: Murmur heard.   Pulmonary:      Effort: Pulmonary effort is normal.      Breath sounds: Wheezing present.   Abdominal:      General: Bowel sounds are normal.      Palpations: Abdomen is soft.   Musculoskeletal:      Right lower leg: Edema present.      Left lower leg: Edema present.   Skin:     General: Skin is warm and dry.   Neurological:      Mental Status: He is alert. Mental status is at baseline.          Significant Labs: ABG:   Recent Labs   Lab 05/27/24  1011   PH 7.32   PCO2 42   HCO3 21.6*   POCSATURATED 27*   , Blood Culture: No results for input(s): "LABBLOO" in the last 48 hours., BMP:   Recent Labs   Lab 05/27/24  1110 05/27/24  1713 05/28/24  1423   GLU 94 90 90    148* 144   K 5.4* 5.0 5.2*   * 122* 120*   CO2 18* 21 17*   BUN 57* 57* 57*   CREATININE 1.82* 1.98* 2.15*   CALCIUM 8.4* 8.4* 8.7   MG 2.7*  --   --    , CMP   Recent Labs   Lab 05/27/24  1110 05/27/24  1713 05/28/24  1423    148* 144   K 5.4* 5.0 5.2*   * 122* 120*   CO2 18* 21 17*   GLU 94 90 90   BUN 57* 57* 57*   CREATININE 1.82* 1.98* 2.15*   CALCIUM 8.4* 8.4* 8.7   PROT 6.6  --  6.0*   ALBUMIN 2.4*  -- " " 2.2*   BILITOT 0.5  --  0.3   ALKPHOS 92  --  82   AST 50*  --  43*   ALT 81*  --  68*   ANIONGAP 9 10 12   , CBC   Recent Labs   Lab 05/27/24  1007 05/27/24  1011 05/27/24  2312 05/28/24  0528   WBC 10.63  --  16.07* 14.20*   HGB 12.3*  --  11.2* 11.6*   HCT 43.8   < > 37.6* 39.8*     --  176 161    < > = values in this interval not displayed.   , Lipid Panel No results for input(s): "CHOL", "HDL", "LDLCALC", "TRIG", "CHOLHDL" in the last 48 hours., and Troponin No results for input(s): "TROPONINI" in the last 48 hours.    Significant Imaging: Echocardiogram: Transthoracic echo (TTE) complete (Cupid Only):   Results for orders placed or performed during the hospital encounter of 05/27/24   Echo   Result Value Ref Range    BSA 1.95 m2    EF 50 %    Est. RA pres 3 mmHg    LVIDd 4.03 3.5 - 6.0 cm    LVIDs 3.06 2.1 - 4.0 cm    IVS 1.88 (A) 0.6 - 1.1 cm    FS 24 (A) 28 - 44 %    Left Ventricle Relative Wall Thickness 0.78 cm    Posterior Wall 1.57 (A) 0.6 - 1.1 cm    LV mass 294.34 g    LV Mass Index 149 g/m2    RVDD 3.96 cm    RV/LV Ratio 0.98 cm    LA size 3.60 cm    RA Width 3.5 cm    TV Regurgitant Volume 15.0 cc    Ao root annulus 3.08 cm    IVC diameter 1.08 cm    ZLVIDS -1.02     ZLVIDD -3.40     LVOT stroke volume 50.47 cm3    LV Systolic Volume 36.85 mL    LV Systolic Volume Index 18.7 mL/m2    LV Diastolic Volume 71.13 mL    LV Diastolic Volume Index 36.11 mL/m2    LVOT diameter 1.98 cm    LVOT area 3.1 cm2    MV Peak E Asael 1.06 m/s    TDI LATERAL 0.08 m/s    TDI SEPTAL 0.06 m/s    E/E' ratio 15.14 m/s    MV Peak A Asael 0.67 m/s    TR Max Asael 1.93 m/s    E/A ratio 1.58     E wave deceleration time 139.70 msec    LV SEPTAL E/E' RATIO 17.67 m/s    LV LATERAL E/E' RATIO 13.25 m/s    LVOT peak asael 0.84 m/s    Left Ventricular Outflow Tract Mean Velocity 0.54 cm/s    Left Ventricular Outflow Tract Mean Gradient 1.39 mmHg    TAPSE 1.69 cm    Left Atrium Major Axis 3.84 cm    RA Major Axis 3.52 cm    AV mean " gradient 4 mmHg    AV peak gradient 5 mmHg    Ao peak jerry 1.12 m/s    Ao VTI 20.80 cm    LVOT peak VTI 16.40 cm    AV valve area 2.43 cm²    AV Velocity Ratio 0.75     AV index (prosthetic) 0.79     MEAGAN by Velocity Ratio 2.31 cm²    Mr max jerry 5.50 m/s    MV mean gradient 3 mmHg    MV peak gradient 8 mmHg    MV stenosis pressure 1/2 time 34.67 ms    MV valve area p 1/2 method 6.35 cm2    MV valve area by continuity eq 1.82 cm2    MV VTI 27.7 cm    TV resting pulmonary artery pressure 18 mmHg    RV TB RVSP 5 mmHg    Triscuspid Valve Regurgitation Peak Gradient 15 mmHg    PV PEAK VELOCITY 0.61 m/s    PV peak gradient 1 mmHg    Mean e' 0.07 m/s    AORTIC VALVE CUSP SEPERATION 2.00 cm    Narrative      Left Ventricle: The left ventricle is normal in size. Moderately   increased wall thickness. Regional wall motion abnormalities present.   Inferior wall hypokinesis There is low normal systolic function with a   visually estimated ejection fraction of 50 - 55%. Ejection fraction by   visual approximation is 50%.    Right Ventricle: Mild right ventricular enlargement.    Aortic Valve: The aortic valve is a trileaflet valve. There is mild   aortic valve sclerosis. Mildly calcified cusps.    Mitral Valve: There is mild bileaflet sclerosis. Mildly thickened   leaflets. There is severe regurgitation with a centrally directed jet.    IVC/SVC: Normal venous pressure at 3 mmHg.    Pericardium: There is a trivial effusion.       , EKG: No results found for this or any previous visit. , and     X-Ray: CXR: X-Ray Chest 1 View (CXR): X-Ray Chest AP Portable  Order: [id number]  Status: Final result       Visible to patient: No (inaccessible in Patient Portal)       Next appt: None    0 Result Notes  Details    Reading Physician Reading Date Result Priority   Tico Dela Cruz MD  [phone] 5/28/2024 Routine     Narrative & Impression  EXAMINATION:  XR CHEST AP PORTABLE     CLINICAL HISTORY:  interval exam, wheezing, pulmonary edema;      TECHNIQUE:  Single frontal view of the chest was performed.     COMPARISON:  05/27/2024     FINDINGS:  Again seen are bilateral parenchymal and interstitial opacities similar to what was seen previously.  No pneumothorax or new abnormality within the chest.     Impression:     The bilateral opacities are similar to prior exam.        Electronically signed by:Tico Dela Cruz  Date:                                            05/28/2024  Time:                                           09:47     Assessment and Plan:     * Hypertensive emergency  - Currently on IV nitro, being followed by critical care team  - Have added Bidil and coreg for blood pressure and heart rate control as well as afterload reduction in setting of severe MR. Wean off nitro gtt as BP allows    Elevated troponin  - Troponin 378, 319; EKG afib with rvr, rate 141 and no acute ischemic changes  - Echo with EF 50-55%, inferior wall hypokinesis, and severe MR  - This does not appear to be ACS, troponin flat, and denies chest pain; likely Type 2 MI in setting of heart failure with hypertensive urgency and severe MR  - Continue home asa, plavix and statin (previous CVAs)    Severe mitral regurgitation  - Patient seen and evaluated by Dr. Kauffman  - Previous echo at [facility] in 2020 with normal EF, trace MR, and no segmental wall motion abnormality  - Repeat echo with EF 50-55%, regional wall motion abnormalities, inferior wall hypokinesis, and severe MR  - Recommend medical management at this time, afterload reduction initiated (Bidil, no ace/arb due to CKD). Continue diuresis. Can consider referral to structural cardiology if unable to manage medically and family wishes.  - Continue monitoring    Acute heart failure  - Patient seen and evaluated by Dr. Kauffman  - Echo with EF 50-55%, severe MR  - Agree with diuresis, afterload reduction added   - Continue monitoring        VTE Risk Mitigation (From admission, onward)           Ordered     IP VTE HIGH RISK  PATIENT  Once         05/27/24 1436     Place sequential compression device  Until discontinued         05/27/24 1436                    Thank you for your consult. I will follow-up with patient. Please contact us if you have any additional questions.    GLENROY Whitaker  Cardiology   [facility]

## 2024-05-29 NOTE — ASSESSMENT & PLAN NOTE
- Troponin 378, 319; EKG afib with rvr, rate 141 and no acute ischemic changes  - Echo with EF 50-55%, inferior wall hypokinesis, and severe MR  - This does not appear to be ACS, troponin flat, and denies chest pain; likely Type 2 MI in setting of heart failure with hypertensive urgency and severe MR  - Continue home asa, plavix and statin (previous CVAs)    5/29/2025:  - Dc'd asa, starting Eliquis for stroke prophylaxis with afib

## 2024-05-29 NOTE — ASSESSMENT & PLAN NOTE
- Currently on IV nitro, being followed by critical care team  - Have added Bidil and coreg for blood pressure and heart rate control as well as afterload reduction in setting of severe MR. Wean off nitro gtt as BP allows    5/29/2024:  - No longer on Nitro gtt, BP better controlled, coreg increased today by primary team

## 2024-05-29 NOTE — ASSESSMENT & PLAN NOTE
- Patient seen and evaluated by Dr. Kauffman  - Previous echo at [facility] in 2020 with normal EF, trace MR, and no segmental wall motion abnormality  - Repeat echo with EF 50-55%, regional wall motion abnormalities, inferior wall hypokinesis, and severe MR  - Recommend medical management at this time, afterload reduction initiated (Bidil, no ace/arb due to CKD). Continue diuresis. Can consider referral to structural cardiology if unable to manage medically and family wishes.  - Continue monitoring    5/29/2024:  - Patient seen and evaluated by Dr. Kauffman  - Continue afterload reduction with BIDIL, no ACE/ARB due to CKD (creatinine trending up 2.33 today)

## 2024-05-29 NOTE — PROGRESS NOTES
[facility]  Cardiology  Progress Note    Patient Name: Claude Patrick  MRN: [id number]  Admission Date: 5/27/2024  Hospital Length of Stay: 2 days  Code Status: DNR   Attending Physician: Margie Medley MD   Primary Care Physician: Tita, Primary Doctor  Expected Discharge Date: 6/11/2024  Principal Problem:Hypertensive emergency    Subjective:     Hospital Course:   No notes on file    Interval History: Patient seen today, now afib rvr on tele, coreg increased this morning. Creatinine bumped to 2.33 and IV lasix discontinued this morning and starting PO lasix tomorrow.     Review of Systems   Reason unable to perform ROS: unable to obtain, aphasia from previous CVA.     Objective:     Vital Signs (Most Recent):  Temp: 98.2 °F (36.8 °C) (05/29/24 1515)  Pulse: (!) 129 (05/29/24 1809)  Resp: 11 (05/29/24 1809)  BP: 111/74 (05/29/24 1800)  SpO2: 100 % (05/29/24 1800) Vital Signs (24h Range):  Temp:  [98.1 °F (36.7 °C)-98.8 °F (37.1 °C)] 98.2 °F (36.8 °C)  Pulse:  [] 129  Resp:  [10-28] 11  SpO2:  [67 %-100 %] 100 %  BP: ()/() 111/74     Weight: 69.2 kg (152 lb 8.9 oz)  Body mass index is 20.69 kg/m².     SpO2: 100 %         Intake/Output Summary (Last 24 hours) at 5/29/2024 1830  Last data filed at 5/29/2024 1809  Gross per 24 hour   Intake 108.07 ml   Output 1820 ml   Net -1711.93 ml       Lines/Drains/Airways       Drain  Duration                  Urethral Catheter 05/27/24 1830 Coude 16 Fr. 2 days              Peripheral Intravenous Line  Duration                  Peripheral IV - Single Lumen 05/27/24 1012 20 G Anterior;Distal;Right Forearm 2 days         Peripheral IV - Single Lumen 05/28/24 0715 20 G;2 in;1/2 in Anterior;Left Forearm 1 day                       Physical Exam  Vitals reviewed.   Constitutional:       General: He is not in acute distress.  Cardiovascular:      Rate and Rhythm: Normal rate and regular rhythm.      Heart sounds: Murmur heard.   Pulmonary:      Effort:  "Pulmonary effort is normal.      Breath sounds: Wheezing present.   Abdominal:      General: Bowel sounds are normal.      Palpations: Abdomen is soft.   Musculoskeletal:      Right lower leg: Edema present.      Left lower leg: Edema present.   Skin:     General: Skin is warm and dry.   Neurological:      Mental Status: He is alert. Mental status is at baseline.            Significant Labs: ABG: No results for input(s): "PH", "PCO2", "HCO3", "POCSATURATED", "BE" in the last 48 hours., Blood Culture: No results for input(s): "LABBLOO" in the last 48 hours., BMP:   Recent Labs   Lab 05/28/24  1423 05/29/24  0313   GLU 90 83    144   K 5.2* 5.0   * 119*   CO2 17* 19*   BUN 57* 63*   CREATININE 2.15* 2.33*   CALCIUM 8.7 8.8   , CMP   Recent Labs   Lab 05/28/24  1423 05/29/24  0313    144   K 5.2* 5.0   * 119*   CO2 17* 19*   GLU 90 83   BUN 57* 63*   CREATININE 2.15* 2.33*   CALCIUM 8.7 8.8   PROT 6.0* 6.5   ALBUMIN 2.2* 2.5*   BILITOT 0.3 0.4   ALKPHOS 82 87   AST 43* 46*   ALT 68* 67*   ANIONGAP 12 11   , CBC   Recent Labs   Lab 05/27/24  2312 05/28/24  0528   WBC 16.07* 14.20*   HGB 11.2* 11.6*   HCT 37.6* 39.8*    161   , INR   Recent Labs   Lab 05/27/24  2313   INR 1.20   , Lipid Panel No results for input(s): "CHOL", "HDL", "LDLCALC", "TRIG", "CHOLHDL" in the last 48 hours., and Troponin No results for input(s): "TROPONINI" in the last 48 hours.    Significant Imaging: Echocardiogram: Transthoracic echo (TTE) complete (Cupid Only):   Results for orders placed or performed during the hospital encounter of 05/27/24   Echo   Result Value Ref Range    BSA 1.95 m2    EF 50 %    Est. RA pres 3 mmHg    LVIDd 4.03 3.5 - 6.0 cm    LVIDs 3.06 2.1 - 4.0 cm    IVS 1.88 (A) 0.6 - 1.1 cm    FS 24 (A) 28 - 44 %    Left Ventricle Relative Wall Thickness 0.78 cm    Posterior Wall 1.57 (A) 0.6 - 1.1 cm    LV mass 294.34 g    LV Mass Index 149 g/m2    RVDD 3.96 cm    RV/LV Ratio 0.98 cm    LA size " 3.60 cm    RA Width 3.5 cm    TV Regurgitant Volume 15.0 cc    Ao root annulus 3.08 cm    IVC diameter 1.08 cm    ZLVIDS -1.02     ZLVIDD -3.40     LVOT stroke volume 50.47 cm3    LV Systolic Volume 36.85 mL    LV Systolic Volume Index 18.7 mL/m2    LV Diastolic Volume 71.13 mL    LV Diastolic Volume Index 36.11 mL/m2    LVOT diameter 1.98 cm    LVOT area 3.1 cm2    MV Peak E Asael 1.06 m/s    TDI LATERAL 0.08 m/s    TDI SEPTAL 0.06 m/s    E/E' ratio 15.14 m/s    MV Peak A Asael 0.67 m/s    TR Max Asael 1.93 m/s    E/A ratio 1.58     E wave deceleration time 139.70 msec    LV SEPTAL E/E' RATIO 17.67 m/s    LV LATERAL E/E' RATIO 13.25 m/s    LVOT peak asael 0.84 m/s    Left Ventricular Outflow Tract Mean Velocity 0.54 cm/s    Left Ventricular Outflow Tract Mean Gradient 1.39 mmHg    TAPSE 1.69 cm    Left Atrium Major Axis 3.84 cm    RA Major Axis 3.52 cm    AV mean gradient 4 mmHg    AV peak gradient 5 mmHg    Ao peak asael 1.12 m/s    Ao VTI 20.80 cm    LVOT peak VTI 16.40 cm    AV valve area 2.43 cm²    AV Velocity Ratio 0.75     AV index (prosthetic) 0.79     MEAGAN by Velocity Ratio 2.31 cm²    Mr max asael 5.50 m/s    MV mean gradient 3 mmHg    MV peak gradient 8 mmHg    MV stenosis pressure 1/2 time 34.67 ms    MV valve area p 1/2 method 6.35 cm2    MV valve area by continuity eq 1.82 cm2    MV VTI 27.7 cm    TV resting pulmonary artery pressure 18 mmHg    RV TB RVSP 5 mmHg    Triscuspid Valve Regurgitation Peak Gradient 15 mmHg    PV PEAK VELOCITY 0.61 m/s    PV peak gradient 1 mmHg    Mean e' 0.07 m/s    AORTIC VALVE CUSP SEPERATION 2.00 cm    Narrative      Left Ventricle: The left ventricle is normal in size. Moderately   increased wall thickness. Regional wall motion abnormalities present.   Inferior wall hypokinesis There is low normal systolic function with a   visually estimated ejection fraction of 50 - 55%. Ejection fraction by   visual approximation is 50%.    Right Ventricle: Mild right ventricular enlargement.     Aortic Valve: The aortic valve is a trileaflet valve. There is mild   aortic valve sclerosis. Mildly calcified cusps.    Mitral Valve: There is mild bileaflet sclerosis. Mildly thickened   leaflets. There is severe regurgitation with a centrally directed jet.    IVC/SVC: Normal venous pressure at 3 mmHg.    Pericardium: There is a trivial effusion.       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      History was obtained from records as patient is a poor historian at baseline.  Patient presented from his nursing home with acute respiratory distress.  There was concern for an aspiration event.  On presentation to the ED he was noted to be hypoxic with SpO2 93%   Associated with increased work of breathing.  A chest x-ray demonstrated hazy opacification of bilateral lungs. He was recently admitted 04/30-05/03 with hypernatremia 2/2 hypovolemia which was attributed to his decreased PO intake.     5/28/2024:  Cardiology consulted for severe MR, flash pulmonary edema. Patient seen today, awake and alert, but not offering any answers to yes/no questions. No family at bedside.    * Hypertensive emergency  - Currently on IV nitro, being followed by critical care team  - Have added Bidil and coreg for blood pressure and heart rate control as well as afterload reduction in setting of severe MR. Wean off nitro gtt as BP allows    5/29/2024:  - No longer on Nitro gtt, BP better controlled, coreg increased today by primary team    Atrial fibrillation with rapid ventricular response  5/29/2024:  - Coreg increased today for better rate control  - Hx of CVA, will start Eliquis 2.5mg BID today for stroke  prophylaxis (discontinuing asa, pt on plavix)    Elevated troponin  - Troponin 378, 319; EKG afib with rvr, rate 141 and no acute ischemic changes  - Echo with EF 50-55%, inferior wall hypokinesis, and severe MR  - This does not appear to be ACS, troponin flat, and denies chest pain; likely Type 2 MI in setting of heart failure with hypertensive urgency and severe MR  - Continue home asa, plavix and statin (previous CVAs)    5/29/2025:  - Dc'd asa, starting Eliquis for stroke prophylaxis with afib    Severe mitral regurgitation  - Patient seen and evaluated by Dr. Kauffman  - Previous echo at [facility] in 2020 with normal EF, trace MR, and no segmental wall motion abnormality  - Repeat echo with EF 50-55%, regional wall motion abnormalities, inferior wall hypokinesis, and severe MR  - Recommend medical management at this time, afterload reduction initiated (Bidil, no ace/arb due to CKD). Continue diuresis. Can consider referral to structural cardiology if unable to manage medically and family wishes.  - Continue monitoring    5/29/2024:  - Patient seen and evaluated by Dr. Kauffman  - Continue afterload reduction with BIDIL, no ACE/ARB due to CKD (creatinine trending up 2.33 today)    Acute heart failure  - Patient seen and evaluated by Dr. Kauffman  - Echo with EF 50-55%, severe MR  - Agree with diuresis, afterload reduction added   - Continue monitoring    5/29/2024:  - Has been transitioned to PO lasix by primary team  - Continue BIDIL, Coreg        VTE Risk Mitigation (From admission, onward)           Ordered     IP VTE HIGH RISK PATIENT  Once         05/27/24 1436     Place sequential compression device  Until discontinued         05/27/24 1436                    GLENROY Whitaker  Cardiology  [facility]

## 2024-05-29 NOTE — ASSESSMENT & PLAN NOTE
- Troponin 378, 319; EKG afib with rvr, rate 141 and no acute ischemic changes  - Echo with EF 50-55%, inferior wall hypokinesis, and severe MR  - This does not appear to be ACS, troponin flat, and denies chest pain; likely Type 2 MI in setting of heart failure with hypertensive urgency and severe MR  - Continue home asa, plavix and statin (previous CVAs)

## 2024-05-30 PROBLEM — I50.9 ACUTE HEART FAILURE: Status: ACTIVE | Noted: 2024-05-30

## 2024-05-30 LAB
ALBUMIN SERPL BCP-MCNC: 2.2 G/DL (ref 3.5–5)
ALBUMIN/GLOB SERPL: 0.6 {RATIO}
ALP SERPL-CCNC: 84 U/L (ref 45–115)
ALT SERPL W P-5'-P-CCNC: 57 U/L (ref 16–61)
ANION GAP SERPL CALCULATED.3IONS-SCNC: 11 MMOL/L (ref 7–16)
ANISOCYTOSIS BLD QL SMEAR: ABNORMAL
APTT PPP: 36.5 SECONDS (ref 25.2–37.3)
AST SERPL W P-5'-P-CCNC: 41 U/L (ref 15–37)
BASOPHILS # BLD AUTO: 0.01 K/UL (ref 0–0.2)
BASOPHILS NFR BLD AUTO: 0.1 % (ref 0–1)
BILIRUB SERPL-MCNC: 0.5 MG/DL (ref ?–1.2)
BUN SERPL-MCNC: 65 MG/DL (ref 7–18)
BUN/CREAT SERPL: 27 (ref 6–20)
CALCIUM SERPL-MCNC: 8.7 MG/DL (ref 8.5–10.1)
CHLORIDE SERPL-SCNC: 122 MMOL/L (ref 98–107)
CO2 SERPL-SCNC: 17 MMOL/L (ref 21–32)
CREAT SERPL-MCNC: 2.42 MG/DL (ref 0.7–1.3)
DIFFERENTIAL METHOD BLD: ABNORMAL
EGFR (NO RACE VARIABLE) (RUSH/TITUS): 26 ML/MIN/1.73M2
EOSINOPHIL # BLD AUTO: 0.04 K/UL (ref 0–0.5)
EOSINOPHIL NFR BLD AUTO: 0.5 % (ref 1–4)
ERYTHROCYTE [DISTWIDTH] IN BLOOD BY AUTOMATED COUNT: 17.7 % (ref 11.5–14.5)
GLOBULIN SER-MCNC: 3.7 G/DL (ref 2–4)
GLUCOSE SERPL-MCNC: 108 MG/DL (ref 70–105)
GLUCOSE SERPL-MCNC: 75 MG/DL (ref 70–105)
GLUCOSE SERPL-MCNC: 78 MG/DL (ref 70–105)
GLUCOSE SERPL-MCNC: 78 MG/DL (ref 74–106)
GLUCOSE SERPL-MCNC: 80 MG/DL (ref 70–105)
GLUCOSE SERPL-MCNC: 90 MG/DL (ref 70–105)
HCT VFR BLD AUTO: 32.7 % (ref 40–54)
HGB BLD-MCNC: 9.9 G/DL (ref 13.5–18)
IMM GRANULOCYTES # BLD AUTO: 0.02 K/UL (ref 0–0.04)
IMM GRANULOCYTES NFR BLD: 0.3 % (ref 0–0.4)
LYMPHOCYTES # BLD AUTO: 0.73 K/UL (ref 1–4.8)
LYMPHOCYTES NFR BLD AUTO: 9.9 % (ref 27–41)
MCH RBC QN AUTO: 28 PG (ref 27–31)
MCHC RBC AUTO-ENTMCNC: 30.3 G/DL (ref 32–36)
MCV RBC AUTO: 92.6 FL (ref 80–96)
MONOCYTES # BLD AUTO: 0.29 K/UL (ref 0–0.8)
MONOCYTES NFR BLD AUTO: 3.9 % (ref 2–6)
MPC BLD CALC-MCNC: 13.7 FL (ref 9.4–12.4)
NEUTROPHILS # BLD AUTO: 6.28 K/UL (ref 1.8–7.7)
NEUTROPHILS NFR BLD AUTO: 85.3 % (ref 53–65)
NRBC # BLD AUTO: 0 X10E3/UL
NRBC, AUTO (.00): 0 %
OHS QRS DURATION: 82 MS
OHS QTC CALCULATION: 448 MS
OVALOCYTES BLD QL SMEAR: ABNORMAL
PLATELET # BLD AUTO: 141 K/UL (ref 150–400)
PLATELET MORPHOLOGY: ABNORMAL
POTASSIUM SERPL-SCNC: 5.1 MMOL/L (ref 3.5–5.1)
PROT SERPL-MCNC: 5.9 G/DL (ref 6.4–8.2)
RBC # BLD AUTO: 3.53 M/UL (ref 4.6–6.2)
SODIUM SERPL-SCNC: 145 MMOL/L (ref 136–145)
TSH SERPL DL<=0.005 MIU/L-ACNC: 1.5 UIU/ML (ref 0.36–3.74)
WBC # BLD AUTO: 7.37 K/UL (ref 4.5–11)

## 2024-05-30 PROCEDURE — 36415 COLL VENOUS BLD VENIPUNCTURE: CPT | Performed by: STUDENT IN AN ORGANIZED HEALTH CARE EDUCATION/TRAINING PROGRAM

## 2024-05-30 PROCEDURE — 25000003 PHARM REV CODE 250: Performed by: NURSE PRACTITIONER

## 2024-05-30 PROCEDURE — 82962 GLUCOSE BLOOD TEST: CPT

## 2024-05-30 PROCEDURE — 11000001 HC ACUTE MED/SURG PRIVATE ROOM

## 2024-05-30 PROCEDURE — 94640 AIRWAY INHALATION TREATMENT: CPT

## 2024-05-30 PROCEDURE — 25000003 PHARM REV CODE 250: Performed by: STUDENT IN AN ORGANIZED HEALTH CARE EDUCATION/TRAINING PROGRAM

## 2024-05-30 PROCEDURE — 99232 SBSQ HOSP IP/OBS MODERATE 35: CPT | Mod: ,,, | Performed by: NURSE PRACTITIONER

## 2024-05-30 PROCEDURE — 25000242 PHARM REV CODE 250 ALT 637 W/ HCPCS: Performed by: STUDENT IN AN ORGANIZED HEALTH CARE EDUCATION/TRAINING PROGRAM

## 2024-05-30 PROCEDURE — 27000221 HC OXYGEN, UP TO 24 HOURS

## 2024-05-30 PROCEDURE — 85025 COMPLETE CBC W/AUTO DIFF WBC: CPT | Performed by: STUDENT IN AN ORGANIZED HEALTH CARE EDUCATION/TRAINING PROGRAM

## 2024-05-30 PROCEDURE — 80053 COMPREHEN METABOLIC PANEL: CPT | Performed by: STUDENT IN AN ORGANIZED HEALTH CARE EDUCATION/TRAINING PROGRAM

## 2024-05-30 PROCEDURE — 99233 SBSQ HOSP IP/OBS HIGH 50: CPT | Mod: ,,, | Performed by: NURSE PRACTITIONER

## 2024-05-30 PROCEDURE — 84443 ASSAY THYROID STIM HORMONE: CPT | Performed by: HOSPITALIST

## 2024-05-30 PROCEDURE — 63600175 PHARM REV CODE 636 W HCPCS: Performed by: STUDENT IN AN ORGANIZED HEALTH CARE EDUCATION/TRAINING PROGRAM

## 2024-05-30 PROCEDURE — 94761 N-INVAS EAR/PLS OXIMETRY MLT: CPT

## 2024-05-30 PROCEDURE — 85730 THROMBOPLASTIN TIME PARTIAL: CPT | Performed by: STUDENT IN AN ORGANIZED HEALTH CARE EDUCATION/TRAINING PROGRAM

## 2024-05-30 PROCEDURE — 99900035 HC TECH TIME PER 15 MIN (STAT)

## 2024-05-30 PROCEDURE — 36415 COLL VENOUS BLD VENIPUNCTURE: CPT | Performed by: HOSPITALIST

## 2024-05-30 RX ADMIN — CLOPIDOGREL BISULFATE 75 MG: 75 TABLET ORAL at 08:05

## 2024-05-30 RX ADMIN — IPRATROPIUM BROMIDE AND ALBUTEROL SULFATE 3 ML: 2.5; .5 SOLUTION RESPIRATORY (INHALATION) at 12:05

## 2024-05-30 RX ADMIN — PRAVASTATIN SODIUM 80 MG: 40 TABLET ORAL at 09:05

## 2024-05-30 RX ADMIN — APIXABAN 2.5 MG: 2.5 TABLET, FILM COATED ORAL at 09:05

## 2024-05-30 RX ADMIN — PREDNISONE 50 MG: 10 TABLET ORAL at 08:05

## 2024-05-30 RX ADMIN — PANTOPRAZOLE SODIUM 40 MG: 40 GRANULE, DELAYED RELEASE ORAL at 08:05

## 2024-05-30 RX ADMIN — IPRATROPIUM BROMIDE AND ALBUTEROL SULFATE 3 ML: 2.5; .5 SOLUTION RESPIRATORY (INHALATION) at 11:05

## 2024-05-30 RX ADMIN — CARVEDILOL 6.25 MG: 6.25 TABLET, FILM COATED ORAL at 08:05

## 2024-05-30 RX ADMIN — LEVETIRACETAM 500 MG: 500 SOLUTION ORAL at 08:05

## 2024-05-30 RX ADMIN — IPRATROPIUM BROMIDE AND ALBUTEROL SULFATE 3 ML: 2.5; .5 SOLUTION RESPIRATORY (INHALATION) at 07:05

## 2024-05-30 RX ADMIN — LEVETIRACETAM 500 MG: 500 SOLUTION ORAL at 09:05

## 2024-05-30 RX ADMIN — HYDRALAZINE HYDROCHLORIDE: 10 TABLET, FILM COATED ORAL at 08:05

## 2024-05-30 RX ADMIN — CARVEDILOL 6.25 MG: 6.25 TABLET, FILM COATED ORAL at 09:05

## 2024-05-30 RX ADMIN — MUPIROCIN: 20 OINTMENT TOPICAL at 08:05

## 2024-05-30 RX ADMIN — MIRTAZAPINE 15 MG: 15 TABLET, FILM COATED ORAL at 09:05

## 2024-05-30 RX ADMIN — IPRATROPIUM BROMIDE AND ALBUTEROL SULFATE 3 ML: 2.5; .5 SOLUTION RESPIRATORY (INHALATION) at 02:05

## 2024-05-30 RX ADMIN — MUPIROCIN: 20 OINTMENT TOPICAL at 09:05

## 2024-05-30 RX ADMIN — APIXABAN 2.5 MG: 2.5 TABLET, FILM COATED ORAL at 08:05

## 2024-05-30 NOTE — ASSESSMENT & PLAN NOTE
No baseline TTE in this patient presenting with hypertensive emergency and flash pulmonary edema now found to have severe MR with normal LA size and PASP. Concern for acute MR of unclear etiology. Will obtain Cardiology consult for evaluation.   - stop Lasix gtt and transition to PO 05/30  - cont Coreg increased dose 6.25 mg Q12H  - afterload reduction with hydralazine  - transition to PO lasix  - monitor Is/Os  - Cardiology consulted, appreciate recs    -- 05/29: discussed with wife management of MR with options discussed including surgical referral vs interventional cards for management vs medical management; opted for medical management with new recurrence of hypertension; will follow up outpatient with repeat TTE to guide future treatment considerations

## 2024-05-30 NOTE — ASSESSMENT & PLAN NOTE
Presenting with pulmonary edema a/w respiratory distress. Markedly elevated BP with signs concerning for heart failure now found to have NTproBNP highest to date. Unclear cardiac history with no baseline TTE. EKG with Afib with RVR and Tn peaked on first draw  - cont nitroglycerin gtt for goal SBP <140  - start lasix IV with gtt  - Tn peaked on first draw  - TTE is notable for  LVEF 50-55%, mild RV enlargement, severe MR and PASP 18  - overall low suspicion for sepsis at this time. Abx stopped. Monitor fever curve and f/u Cxs

## 2024-05-30 NOTE — SUBJECTIVE & OBJECTIVE
Interval History/Significant Events:  Heart rate improved. In 60's on exam.     Review of Systems  Objective:     Vital Signs (Most Recent):  Temp: 98.2 °F (36.8 °C) (05/30/24 1508)  Pulse: 63 (05/30/24 1530)  Resp: 11 (05/30/24 1530)  BP: 107/73 (05/30/24 1515)  SpO2: 100 % (05/30/24 1530) Vital Signs (24h Range):  Temp:  [98 °F (36.7 °C)-98.6 °F (37 °C)] 98.2 °F (36.8 °C)  Pulse:  [] 63  Resp:  [8-22] 11  SpO2:  [93 %-100 %] 100 %  BP: ()/() 107/73   Weight: 72.3 kg (159 lb 6.3 oz)  Body mass index is 21.62 kg/m².      Intake/Output Summary (Last 24 hours) at 5/30/2024 1615  Last data filed at 5/30/2024 1110  Gross per 24 hour   Intake --   Output 1100 ml   Net -1100 ml          Physical Exam  Constitutional:       General: He is not in acute distress.     Appearance: He is ill-appearing.   HENT:      Head: Normocephalic and atraumatic.   Eyes:      General: No scleral icterus.  Cardiovascular:      Rate and Rhythm: Tachycardia present. Rhythm irregular.      Heart sounds: Murmur heard.   Pulmonary:      Effort: Pulmonary effort is normal.      Breath sounds: Wheezing present.   Abdominal:      Palpations: Abdomen is soft.      Tenderness: There is no abdominal tenderness. There is no guarding.   Musculoskeletal:      Right lower leg: Edema present.      Left lower leg: Edema present.   Skin:     General: Skin is warm.      Coloration: Skin is not jaundiced.   Neurological:      Mental Status: He is alert. Mental status is at baseline.            Vents:  Oxygen Concentration (%): 28 (05/29/24 1448)  Lines/Drains/Airways       Drain  Duration                  Urethral Catheter 05/27/24 1830 Coude 16 Fr. 2 days              Peripheral Intravenous Line  Duration                  Peripheral IV - Single Lumen 05/27/24 1012 20 G Anterior;Distal;Right Forearm 3 days         Peripheral IV - Single Lumen 05/28/24 0715 20 G;2 in;1/2 in Anterior;Left Forearm 2 days                  Significant  Labs:    CBC/Anemia Profile:  Recent Labs   Lab 05/30/24  1037   WBC 7.37   HGB 9.9*   HCT 32.7*   *   MCV 92.6   RDW 17.7*        Chemistries:  Recent Labs   Lab 05/29/24  0313 05/30/24  0401    145   K 5.0 5.1   * 122*   CO2 19* 17*   BUN 63* 65*   CREATININE 2.33* 2.42*   CALCIUM 8.8 8.7   ALBUMIN 2.5* 2.2*   PROT 6.5 5.9*   BILITOT 0.4 0.5   ALKPHOS 87 84   ALT 67* 57   AST 46* 41*       All pertinent labs within the past 24 hours have been reviewed.    Significant Imaging:  I have reviewed all pertinent imaging results/findings within the past 24 hours.

## 2024-05-30 NOTE — PT/OT/SLP EVAL
PT screen only. Pt has history of CVA with right sided weakness. Pt presents in ICU with RUE and RLE contractures. Pt also has right lateral cervical flexion contracture. Pt is aphasic but will nod his head to yes/no questions. Per chart review, pt is from NH and dependent for all self care. Pt is not a candidate for skilled PT intervention.

## 2024-05-30 NOTE — ASSESSMENT & PLAN NOTE
2/2 valvular heart disease and uncontrolled hypertension. Management as per MR and hypertensive emergency.

## 2024-05-30 NOTE — ASSESSMENT & PLAN NOTE
Hyperkalemia with renal function at baseline (SCr 1.82 with prior baseline 2-2.7).  - repeat BMP daily  - treated with IV lasix

## 2024-05-30 NOTE — ASSESSMENT & PLAN NOTE
CXR with diffuse interstitial opacities consistent with pulmonary edema. VBG reviewed with  mild metabolic acidosis and no hypercapnia.   Placed on BPAP with good volume.   - BPAP as needed for increased work of breathing  - supplement for goal SpO2 >92%  - diuresis as per hypertensive emergency plan  - most likely a cardiac wheeze given imaging; will treat with scheduled DuoNebs x48 hrs  - prednisone burst x5 days

## 2024-05-30 NOTE — PT/OT/SLP EVAL
OT Screen completed. Pt not appropriate for services due to dependent and at baseline. Pt with history of CVA with contractures to RUE and RLE. Pt is resident at [facility]. Pt receives assist with all ADL tasks and ADL t/f . No further skilled OT services indicated at this time.

## 2024-05-30 NOTE — PROGRESS NOTES
[facility]  Critical Care Medicine  Progress Note    Patient Name: Claude Patrick  MRN: [id number]  Admission Date: 5/27/2024  Hospital Length of Stay: 3 days  Code Status: DNR  Attending Provider: Margie Medley MD  Primary Care Provider: Tita, Primary Doctor   Principal Problem: Hypertensive emergency    Subjective:     HPI:  84 yo M with CVA w/ expressive aphasia and residual R sided weakness, HTN, prostate cancer s/p prostatectomy (2004),  FTT, seizure disorder who resides at [facility] (since 10/2023) presented 05/27 with shortness of breath found to be in respiratory distress with admission to ICU for management of hypertensive emergency.     History was obtained from records as patient is a poor historian at baseline.  Patient presented from his nursing home with acute respiratory distress.  There was concern for an aspiration event.  On presentation to the ED he was noted to be hypoxic with SpO2 93%   Associated with increased work of breathing.  A chest x-ray demonstrated hazy opacification of bilateral lungs. He was recently admitted 04/30-05/03 with hypernatremia 2/2 hypovolemia which was attributed to his decreased PO intake.   ED course with patient receiving vancomycin IV x1, Zosyn IV x1 and NS IV x1L. On my assessment, recommended diuresis with Lasix IV, NIV settings were changed and started him on a nitro glycerin drip.          Hospital/ICU Course:  No notes on file    Interval History/Significant Events:  Heart rate improved. In 60's on exam.     Review of Systems  Objective:     Vital Signs (Most Recent):  Temp: 98.2 °F (36.8 °C) (05/30/24 1508)  Pulse: 63 (05/30/24 1530)  Resp: 11 (05/30/24 1530)  BP: 107/73 (05/30/24 1515)  SpO2: 100 % (05/30/24 1530) Vital Signs (24h Range):  Temp:  [98 °F (36.7 °C)-98.6 °F (37 °C)] 98.2 °F (36.8 °C)  Pulse:  [] 63  Resp:  [8-22] 11  SpO2:  [93 %-100 %] 100 %  BP: ()/() 107/73   Weight: 72.3 kg (159 lb 6.3 oz)  Body  mass index is 21.62 kg/m².      Intake/Output Summary (Last 24 hours) at 5/30/2024 1615  Last data filed at 5/30/2024 1110  Gross per 24 hour   Intake --   Output 1100 ml   Net -1100 ml          Physical Exam  Constitutional:       General: He is not in acute distress.     Appearance: He is ill-appearing.   HENT:      Head: Normocephalic and atraumatic.   Eyes:      General: No scleral icterus.  Cardiovascular:      Rate and Rhythm: Tachycardia present. Rhythm irregular.      Heart sounds: Murmur heard.   Pulmonary:      Effort: Pulmonary effort is normal.      Breath sounds: Wheezing present.   Abdominal:      Palpations: Abdomen is soft.      Tenderness: There is no abdominal tenderness. There is no guarding.   Musculoskeletal:      Right lower leg: Edema present.      Left lower leg: Edema present.   Skin:     General: Skin is warm.      Coloration: Skin is not jaundiced.   Neurological:      Mental Status: He is alert. Mental status is at baseline.            Vents:  Oxygen Concentration (%): 28 (05/29/24 1448)  Lines/Drains/Airways       Drain  Duration                  Urethral Catheter 05/27/24 1830 Coude 16 Fr. 2 days              Peripheral Intravenous Line  Duration                  Peripheral IV - Single Lumen 05/27/24 1012 20 G Anterior;Distal;Right Forearm 3 days         Peripheral IV - Single Lumen 05/28/24 0715 20 G;2 in;1/2 in Anterior;Left Forearm 2 days                  Significant Labs:    CBC/Anemia Profile:  Recent Labs   Lab 05/30/24  1037   WBC 7.37   HGB 9.9*   HCT 32.7*   *   MCV 92.6   RDW 17.7*        Chemistries:  Recent Labs   Lab 05/29/24  0313 05/30/24  0401    145   K 5.0 5.1   * 122*   CO2 19* 17*   BUN 63* 65*   CREATININE 2.33* 2.42*   CALCIUM 8.8 8.7   ALBUMIN 2.5* 2.2*   PROT 6.5 5.9*   BILITOT 0.4 0.5   ALKPHOS 87 84   ALT 67* 57   AST 46* 41*       All pertinent labs within the past 24 hours have been reviewed.    Significant Imaging:  I have reviewed all  pertinent imaging results/findings within the past 24 hours.    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  Does not ambulate at baseline.  On Plavix and aspirin.  Presenting in hypertensive emergency. CT head negative for acute intracranial process. Appears to be at baseline 05/28.  - wife updated 05/28  - cont ASA and Plavix    -- SLP evaluation completed    Pulmonary  Acute respiratory failure with hypoxia  CXR with diffuse interstitial opacities consistent with pulmonary edema. VBG reviewed with  mild metabolic acidosis and no hypercapnia.   Placed on BPAP with good volume.   - BPAP as needed for increased work of breathing  - supplement for goal SpO2 >92%  - diuresis as per hypertensive emergency plan  - most likely a cardiac wheeze given imaging; will treat with scheduled DuoNebs x48 hrs  - prednisone burst x5 days    Cardiac/Vascular  * Hypertensive emergency  Presenting with pulmonary edema a/w respiratory distress. Markedly elevated BP with signs concerning for heart failure now found to have NTproBNP highest to date. Unclear cardiac history with no baseline TTE. EKG with Afib with RVR and Tn peaked on first draw  - cont nitroglycerin gtt for goal SBP <140  - start lasix IV with gtt  - Tn peaked on first draw  - TTE is notable for  LVEF 50-55%, mild RV enlargement, severe MR and PASP 18  - overall low suspicion for sepsis at this time. Abx stopped. Monitor fever curve and f/u Cxs     Atrial fibrillation with rapid ventricular response  Improving with Coreg. Start Eliquis. On  Plavix for CVA management.    Severe mitral regurgitation  No baseline TTE in this patient presenting with hypertensive emergency and  flash pulmonary edema now found to have severe MR with normal LA size and PASP. Concern for acute MR of unclear etiology. Will obtain Cardiology consult for evaluation.   - stop Lasix gtt and transition to PO 05/30  - cont Coreg increased dose 6.25 mg Q12H  - afterload reduction with hydralazine  - transition to PO lasix  - monitor Is/Os  - Cardiology consulted, appreciate recs    -- 05/29: discussed with wife management of MR with options discussed including surgical referral vs interventional cards for management vs medical management; opted for medical management with new recurrence of hypertension; will follow up outpatient with repeat TTE to guide future treatment considerations    Acute on chronic diastolic heart failure  2/2 valvular heart disease and uncontrolled hypertension. Management as per MR and hypertensive emergency.     Renal/  Hyperkalemia  Hyperkalemia with renal function at baseline (SCr 1.82 with prior baseline 2-2.7).  - repeat BMP daily  - treated with IV lasix      PT/OT consulted today; transfer to floor.      RYANNE Webb-ACNP  Critical Care Medicine  [facility]

## 2024-05-30 NOTE — PLAN OF CARE
Problem: Skin Injury Risk Increased  Goal: Skin Health and Integrity  Intervention: Optimize Skin Protection  Flowsheets (Taken 5/30/2024 7010)  Pressure Reduction Techniques:   heels elevated off bed   positioned off wounds   pressure points protected   sit time limited to 2 hours  Pressure Reduction Devices:   foam padding utilized   heel offloading device utilized   positioning supports utilized   specialty bed utilized  Skin Protection:   incontinence pads utilized   silicone foam dressing in place   transparent dressing maintained  Activity Management: Arm raise - L1  Head of Bed (HOB) Positioning: HOB at 30 degrees

## 2024-05-31 PROBLEM — E87.0 HYPERNATREMIA: Status: ACTIVE | Noted: 2024-05-31

## 2024-05-31 PROBLEM — I21.A1 TYPE 2 MYOCARDIAL INFARCTION: Status: ACTIVE | Noted: 2024-05-28

## 2024-05-31 LAB
ALBUMIN SERPL BCP-MCNC: 2.5 G/DL (ref 3.5–5)
ALBUMIN/GLOB SERPL: 0.6 {RATIO}
ALP SERPL-CCNC: 85 U/L (ref 45–115)
ALT SERPL W P-5'-P-CCNC: 49 U/L (ref 16–61)
ANION GAP SERPL CALCULATED.3IONS-SCNC: 12 MMOL/L (ref 7–16)
APTT PPP: 37.2 SECONDS (ref 25.2–37.3)
AST SERPL W P-5'-P-CCNC: 29 U/L (ref 15–37)
BILIRUB SERPL-MCNC: 0.5 MG/DL (ref ?–1.2)
BUN SERPL-MCNC: 73 MG/DL (ref 7–18)
BUN/CREAT SERPL: 29 (ref 6–20)
CALCIUM SERPL-MCNC: 9.1 MG/DL (ref 8.5–10.1)
CHLORIDE SERPL-SCNC: 122 MMOL/L (ref 98–107)
CO2 SERPL-SCNC: 20 MMOL/L (ref 21–32)
CREAT SERPL-MCNC: 2.53 MG/DL (ref 0.7–1.3)
EGFR (NO RACE VARIABLE) (RUSH/TITUS): 25 ML/MIN/1.73M2
GLOBULIN SER-MCNC: 4 G/DL (ref 2–4)
GLUCOSE SERPL-MCNC: 102 MG/DL (ref 70–105)
GLUCOSE SERPL-MCNC: 109 MG/DL (ref 74–106)
GLUCOSE SERPL-MCNC: 141 MG/DL (ref 70–105)
GLUCOSE SERPL-MCNC: 165 MG/DL (ref 70–105)
GLUCOSE SERPL-MCNC: 93 MG/DL (ref 70–105)
POTASSIUM SERPL-SCNC: 5.1 MMOL/L (ref 3.5–5.1)
PROT SERPL-MCNC: 6.5 G/DL (ref 6.4–8.2)
SODIUM SERPL-SCNC: 149 MMOL/L (ref 136–145)

## 2024-05-31 PROCEDURE — 27000221 HC OXYGEN, UP TO 24 HOURS

## 2024-05-31 PROCEDURE — 36415 COLL VENOUS BLD VENIPUNCTURE: CPT | Performed by: STUDENT IN AN ORGANIZED HEALTH CARE EDUCATION/TRAINING PROGRAM

## 2024-05-31 PROCEDURE — 85730 THROMBOPLASTIN TIME PARTIAL: CPT | Performed by: STUDENT IN AN ORGANIZED HEALTH CARE EDUCATION/TRAINING PROGRAM

## 2024-05-31 PROCEDURE — 63600175 PHARM REV CODE 636 W HCPCS: Performed by: STUDENT IN AN ORGANIZED HEALTH CARE EDUCATION/TRAINING PROGRAM

## 2024-05-31 PROCEDURE — 94761 N-INVAS EAR/PLS OXIMETRY MLT: CPT

## 2024-05-31 PROCEDURE — 99233 SBSQ HOSP IP/OBS HIGH 50: CPT | Mod: ,,, | Performed by: INTERNAL MEDICINE

## 2024-05-31 PROCEDURE — 82962 GLUCOSE BLOOD TEST: CPT

## 2024-05-31 PROCEDURE — 25000003 PHARM REV CODE 250: Performed by: INTERNAL MEDICINE

## 2024-05-31 PROCEDURE — 11000001 HC ACUTE MED/SURG PRIVATE ROOM

## 2024-05-31 PROCEDURE — 99900035 HC TECH TIME PER 15 MIN (STAT)

## 2024-05-31 PROCEDURE — 25000003 PHARM REV CODE 250: Performed by: STUDENT IN AN ORGANIZED HEALTH CARE EDUCATION/TRAINING PROGRAM

## 2024-05-31 PROCEDURE — 25000242 PHARM REV CODE 250 ALT 637 W/ HCPCS: Performed by: STUDENT IN AN ORGANIZED HEALTH CARE EDUCATION/TRAINING PROGRAM

## 2024-05-31 PROCEDURE — 80053 COMPREHEN METABOLIC PANEL: CPT | Performed by: STUDENT IN AN ORGANIZED HEALTH CARE EDUCATION/TRAINING PROGRAM

## 2024-05-31 PROCEDURE — 25000242 PHARM REV CODE 250 ALT 637 W/ HCPCS: Performed by: INTERNAL MEDICINE

## 2024-05-31 PROCEDURE — 25000003 PHARM REV CODE 250: Performed by: NURSE PRACTITIONER

## 2024-05-31 PROCEDURE — 94640 AIRWAY INHALATION TREATMENT: CPT

## 2024-05-31 RX ORDER — IPRATROPIUM BROMIDE AND ALBUTEROL SULFATE 2.5; .5 MG/3ML; MG/3ML
3 SOLUTION RESPIRATORY (INHALATION) EVERY 4 HOURS PRN
Status: DISCONTINUED | OUTPATIENT
Start: 2024-05-31 | End: 2024-06-05 | Stop reason: HOSPADM

## 2024-05-31 RX ORDER — METOPROLOL TARTRATE 50 MG/1
50 TABLET ORAL 3 TIMES DAILY
Status: DISCONTINUED | OUTPATIENT
Start: 2024-05-31 | End: 2024-06-01

## 2024-05-31 RX ORDER — METOPROLOL TARTRATE 1 MG/ML
5 INJECTION, SOLUTION INTRAVENOUS ONCE
Status: COMPLETED | OUTPATIENT
Start: 2024-05-31 | End: 2024-05-31

## 2024-05-31 RX ADMIN — METOPROLOL TARTRATE 5 MG: 1 INJECTION, SOLUTION INTRAVENOUS at 11:05

## 2024-05-31 RX ADMIN — METOPROLOL TARTRATE 5 MG: 1 INJECTION, SOLUTION INTRAVENOUS at 05:05

## 2024-05-31 RX ADMIN — PANTOPRAZOLE SODIUM 40 MG: 40 GRANULE, DELAYED RELEASE ORAL at 09:05

## 2024-05-31 RX ADMIN — LEVETIRACETAM 500 MG: 500 SOLUTION ORAL at 09:05

## 2024-05-31 RX ADMIN — APIXABAN 2.5 MG: 2.5 TABLET, FILM COATED ORAL at 08:05

## 2024-05-31 RX ADMIN — PRAVASTATIN SODIUM 80 MG: 40 TABLET ORAL at 08:05

## 2024-05-31 RX ADMIN — IPRATROPIUM BROMIDE AND ALBUTEROL SULFATE 3 ML: 2.5; .5 SOLUTION RESPIRATORY (INHALATION) at 11:05

## 2024-05-31 RX ADMIN — MUPIROCIN: 20 OINTMENT TOPICAL at 09:05

## 2024-05-31 RX ADMIN — IPRATROPIUM BROMIDE AND ALBUTEROL SULFATE 3 ML: 2.5; .5 SOLUTION RESPIRATORY (INHALATION) at 03:05

## 2024-05-31 RX ADMIN — CLOPIDOGREL BISULFATE 75 MG: 75 TABLET ORAL at 09:05

## 2024-05-31 RX ADMIN — METOPROLOL TARTRATE 50 MG: 50 TABLET, FILM COATED ORAL at 08:05

## 2024-05-31 RX ADMIN — LEVETIRACETAM 500 MG: 500 SOLUTION ORAL at 08:05

## 2024-05-31 RX ADMIN — APIXABAN 2.5 MG: 2.5 TABLET, FILM COATED ORAL at 09:05

## 2024-05-31 RX ADMIN — PREDNISONE 50 MG: 10 TABLET ORAL at 09:05

## 2024-05-31 RX ADMIN — HYDRALAZINE HYDROCHLORIDE: 10 TABLET, FILM COATED ORAL at 08:05

## 2024-05-31 RX ADMIN — IPRATROPIUM BROMIDE AND ALBUTEROL SULFATE 3 ML: 2.5; .5 SOLUTION RESPIRATORY (INHALATION) at 07:05

## 2024-05-31 RX ADMIN — IPRATROPIUM BROMIDE AND ALBUTEROL SULFATE 3 ML: .5; 3 SOLUTION RESPIRATORY (INHALATION) at 06:05

## 2024-05-31 RX ADMIN — MIRTAZAPINE 15 MG: 15 TABLET, FILM COATED ORAL at 08:05

## 2024-05-31 RX ADMIN — HYDRALAZINE HYDROCHLORIDE: 10 TABLET, FILM COATED ORAL at 02:05

## 2024-05-31 RX ADMIN — HYDRALAZINE HYDROCHLORIDE: 10 TABLET, FILM COATED ORAL at 09:05

## 2024-05-31 RX ADMIN — METOPROLOL TARTRATE 50 MG: 50 TABLET, FILM COATED ORAL at 02:05

## 2024-05-31 RX ADMIN — CARVEDILOL 6.25 MG: 6.25 TABLET, FILM COATED ORAL at 09:05

## 2024-05-31 RX ADMIN — MUPIROCIN: 20 OINTMENT TOPICAL at 08:05

## 2024-05-31 NOTE — PLAN OF CARE
05/31/24 @ 1221 - Pt is resident @ [facility]; ABI spoke to Edna @ [facility]; pt is expected back to their facility upon d/c from this facility (planned to Monday 06/03/24). Pt info sent to [facility]. D/C packet started. CM will continue to follow.

## 2024-05-31 NOTE — ASSESSMENT & PLAN NOTE
Per echocardiogram- Mitral Valve: There is mild bileaflet sclerosis. Mildly thickened leaflets. There is severe regurgitation with a centrally directed jet.  Cardiology followed, recommend medical management at this time, afterload reduction initiated (Bidil, no ace/arb due to CKD). Continue diuresis when able. Can consider referral to structural cardiology if unable to manage medically and family wishes.

## 2024-05-31 NOTE — ASSESSMENT & PLAN NOTE
Patient has a current diagnosis of Hypertensive emergency with end organ damage evidenced by acute kidney injury and acute heart failure which is controlled.  Latest blood pressure and vitals reviewed-   Temp:  [96.8 °F (36 °C)-98.8 °F (37.1 °C)]   Pulse:  []   Resp:  [9-22]   BP: ()/()   SpO2:  [95 %-100 %] .   Patient currently off IV antihypertensives.   Home meds for hypertension were reviewed and noted below.       Medication adjustment for hospital antihypertensives is as follows- resume current medications (started during this hospitalization): hydralazine, Imdur and carvedilol.

## 2024-05-31 NOTE — ASSESSMENT & PLAN NOTE
Patient with Persistent (7 days or more) atrial fibrillation which is uncontrolled currently with Beta Blocker. Patient is currently in atrial fibrillation.[id number] Score: 2. HASBLED Score: 2. Anticoagulation indicated. Anticoagulation done with Eliquis .    Required IV Lopressor 5mg x one dose on 5/31 for better HR control.   Cardiology signed off.

## 2024-05-31 NOTE — PLAN OF CARE
Problem: Adult Inpatient Plan of Care  Goal: Plan of Care Review  Outcome: Progressing  Goal: Patient-Specific Goal (Individualized)  Outcome: Progressing  Goal: Absence of Hospital-Acquired Illness or Injury  Outcome: Progressing  Goal: Optimal Comfort and Wellbeing  Outcome: Progressing  Goal: Readiness for Transition of Care  Outcome: Progressing     Problem: Acute Kidney Injury/Impairment  Goal: Fluid and Electrolyte Balance  Outcome: Progressing  Goal: Improved Oral Intake  Outcome: Progressing  Goal: Effective Renal Function  Outcome: Progressing

## 2024-05-31 NOTE — ASSESSMENT & PLAN NOTE
- Patient seen and evaluated by Dr. Kauffman  - Echo with EF 50-55%, severe MR  - Agree with diuresis, afterload reduction added   - Continue monitoring    5/29/2024:  - Has been transitioned to PO lasix by primary team  - Continue BIDIL, Coreg    5/30/2024:  - Cardiology will sign off at this time, please call if any further assistance is needed  - Follow up with cardiology in 2 weeks

## 2024-05-31 NOTE — PLAN OF CARE
Problem: Skin Injury Risk Increased  Goal: Skin Health and Integrity  Outcome: Progressing     Problem: Gas Exchange Impaired  Goal: Optimal Gas Exchange  Outcome: Progressing

## 2024-05-31 NOTE — PROGRESS NOTES
[facility]  Adult Nutrition  First Assessment Note         Reason for Assessment  Reason For Assessment: RD follow-up   Nutrition Risk Screen: difficulty chewing/swallowing    Assessment and Plan  5/31/2024 RD Follow up: Patient advanced to Pureed diet with nectar liquids per SLP. Limited po intake documented per flowsheets. Recommend to add Boost Plus thickened to nectar consistency. Total assist with all meals and encourage po intake. RD Following.     Patient is an 83y male admitted 5/27 for hypertensive emergency and aspiration pneumonia. He was identified at risk by screening criteria with MST2.     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 Chart review reveals very little recent weight history. Patient is noted to have moderate fat and muscle depletion to temple, orbital, buccal, and clavicle regions.     Per ASPEN guidelines, patient meets criteria for moderate protein-calorie malnutrition secondary to chronic illness (hx CVA) as evidenced by moderate fat and muscle depletion to temple, orbital, buccal, and clavicle regions.     Discussed patient with nursing this AM during IDT rounds. Patient is currently NPO after failed bedside swallow eval performed by nursing. SLP evaluation has been ordered.     Recommend consider alternate route of nutrition if unable to advance PO diet x 2-3 days.    Last BM 5/27 per flowsheet.    Medications/labs reviewed. RD following.          Learning Needs/Social Determinants of Health  Learning Assessment       05/27/2024 1945 [facility] (5/27/2024 - Present)   Created by Terry Ferrer, RN - RN (Nurse) Status: Complete                 PRIMARY LEARNER     Primary Learner Name:  claude patrick RB - 05/27/2024 1945    Relationship:  Patient RB - 05/27/2024 1945    Does the primary learner have any barriers to learning?:  No Barriers RB - 05/27/2024 1945    What is the preferred language of the primary learner?:  English RB -  05/27/2024 1945    Is an  required?:  No RB - 05/27/2024 1945    How does the primary learner prefer to learn new concepts?:  Listening RB - 05/27/2024 1945    How often do you need to have someone help you read instructions, pamphlets, or written material from your doctor or pharmacy?:  Never RB - 05/27/2024 1945        CO-LEARNER #1     No question answered        CO-LEARNER #2     No question answered        SPECIAL TOPICS     No question answered        ANSWERED BY:     No question answered        Comments         Edit History       Terry Ferrer, RN - RN (Nurse)   05/27/2024 1945                           Social Determinants of Health     Tobacco Use: Low Risk  (10/3/2022)    Received from [facility]    Patient History     Smoking Tobacco Use: Never     Smokeless Tobacco Use: Never     Passive Exposure: Not on file   Alcohol Use: Not At Risk (5/28/2024)    AUDIT-C     Frequency of Alcohol Consumption: Never     Average Number of Drinks: Patient does not drink     Frequency of Binge Drinking: Never   Financial Resource Strain: Patient Unable To Answer (5/28/2024)    Overall Financial Resource Strain (CARDIA)     Difficulty of Paying Living Expenses: Patient unable to answer   Food Insecurity: Patient Unable To Answer (5/28/2024)    Hunger Vital Sign     Worried About Running Out of Food in the Last Year: Patient unable to answer     Ran Out of Food in the Last Year: Patient unable to answer   Transportation Needs: No Transportation Needs (5/28/2024)    TRANSPORTATION NEEDS     Transportation : No   Physical Activity: Inactive (5/28/2024)    Exercise Vital Sign     Days of Exercise per Week: 0 days     Minutes of Exercise per Session: 0 min   Stress: Patient Unable To Answer (5/28/2024)    [employer] - Occupational Stress Questionnaire     Feeling of Stress : Patient unable to answer   Housing Stability: Patient Unable To Answer (5/28/2024)     Housing Stability Vital Sign     Unable to Pay for Housing in the Last Year: Patient unable to answer     Homeless in the Last Year: Patient unable to answer   Depression: Not on file   Utilities: Patient Unable To Answer (5/28/2024)    [facility] Utilities     Threatened with loss of utilities: Patient unable to answer   Health Literacy: Patient Unable To Answer (5/28/2024)     Health Literacy     Frequency of need for help with medical instructions: Patient unable to respond   Social Isolation: Patient Unable To Answer (5/28/2024)    Social Isolation     Social Isolation: 6            Malnutrition  Is Patient Malnourished: Yes Malnutrition Assessment  Malnutrition Context: chronic illness  Malnutrition Level: moderate          Energy Intake (Malnutrition): less than or equal to 50% for greater than or equal to 1 month  Subcutaneous Fat (Malnutrition): moderate depletion  Muscle Mass (Malnutrition): moderate depletion   Orbital Region (Subcutaneous Fat Loss): moderate depletion   Anabaptism Region (Muscle Loss): moderate depletion  Clavicle Bone Region (Muscle Loss): moderate depletion                 Nutrition Diagnosis  Malnutrition (Moderate) related to Decreased ability to consume sufficient energy as evidenced by hx CVA, failed beside swallow evaluation, moderate fat and muscle depletion to orbital, buccal, temple, and clavicle regions  Comments: consider alternate route of nutrition    Recent Labs   Lab 05/31/24  0414 05/31/24  0637   GLU  --  109*   POCGLU 102  --          Nutrition Prescription / Recommendations  Recommendation/Intervention: Continue diet as tolerated. assist with all meals as needed. Encourage po intakes  Goals: diet tolerance with 50-75% intake during remainder of admission  Nutrition Goal Status: new  Current Diet Order: pureed with nectar liquids  Nutrition Order Comments: SLP recommendations  Chewing or Swallowing Difficulty?: Swallowing difficulty  Recommended Diet: Puree  Recommended Oral  Supplement: No Oral Supplements  Is Nutrition Support Recommended: [facility] Support: No  Is Nutrition Education Recommended: No    Monitor and Evaluation  % current Intake: NPO  % intake to meet estimated needs: Enteral Nutrition   Food and Nutrient Intake: food and beverage intake, enteral nutrition intake  Food and Nutrient Adminstration: diet order  Anthropometric Measurements: weight, weight change  Biochemical Data, Medical Tests and Procedures: electrolyte and renal panel, gastrointestinal profile, glucose/endocrine profile, inflammatory profile, lipid profile       Current Medical Diagnosis and Past Medical History     Past Medical History:   Diagnosis Date    Depression     Hemiplegia and hemiparesis following cerebral infarction affecting right dominant side     Hypertension     Seizures     Stroke     Vascular dementia        Nutrition/Diet History  Spiritual, Cultural Beliefs, Hindu Practices, Values that Affect Care: no  Food Allergies: NKFA  Factors Affecting Nutritional Intake: difficulty/impaired swallowing    Lab/Procedures/Meds  Recent Labs   Lab 05/31/24  0637   *   K 5.1   BUN 73*   CREATININE 2.53*   CALCIUM 9.1   ALBUMIN 2.5*   *   ALT 49   AST 29   Note: Na+, Cl- elevated. BUN/Cr elevated. PMH DENYS, Ca++ low    Last A1c:   Lab Results   Component Value Date    HGBA1C 5.7 07/14/2020     Lab Results   Component Value Date    RBC 3.53 (L) 05/30/2024    HGB 9.9 (L) 05/30/2024    HCT 32.7 (L) 05/30/2024    MCV 92.6 05/30/2024    MCH 28.0 05/30/2024    MCHC 30.3 (L) 05/30/2024   Note: H&H low    Pertinent Labs Reviewed: reviewed  Pertinent Medications Reviewed: reviewed  Scheduled Meds:   albuterol-ipratropium  3 mL Nebulization Q8H    apixaban  2.5 mg Oral BID    carvediloL  6.25 mg Oral BID    clopidogreL  75 mg Oral Daily    hydrALAZINE 10 mg 2 tablets, hydrALAZINE 25 mg 2 tablets, isorsorbide dinitrate 20 mg  2 tablets combination   Oral TID    levetiracetam  500 mg  Oral BID    mirtazapine  15 mg Oral QHS    mupirocin   Nasal BID    pantoprazole  40 mg Oral Daily    pravastatin  80 mg Oral QHS    predniSONE  50 mg Oral Daily     Continuous Infusions:      PRN Meds:.  Current Facility-Administered Medications:     acetaminophen, 650 mg, Oral, Q6H PRN    albuterol-ipratropium, 3 mL, Nebulization, Q4H PRN    dextrose 10%, 12.5 g, Intravenous, PRN    dextrose 10%, 25 g, Intravenous, PRN    glucagon (human recombinant), 1 mg, Intramuscular, PRN    ondansetron, 4 mg, Intravenous, Q8H PRN    senna, 8.6 mg, Oral, Daily PRN    sodium chloride 0.9%, 10 mL, Intravenous, PRN    Anthropometrics  Temp: 98.8 °F (37.1 °C)  Height Method: Estimated  Height: 6' (182.9 cm)  Height (inches): 72 in  Weight Method: Bed Scale  Weight: 72.3 kg (159 lb 6.3 oz)  Weight (lb): 159.39 lb  Ideal Body Weight (IBW), Male: 178 lb  % Ideal Body Weight, Male (lb): 92.65 %  BMI (Calculated): 21.6       Estimated/Assessed Needs  RMR (Faison-St. Jeor Equation): 1466     Temp: 98.8 °F (37.1 °C)Oral  Weight Used For Calorie Calculations: 75.2 kg (165 lb 12.6 oz)     Energy Calorie Requirements (kcal): 1880-2256kcal (25-30kcal/kg)  Weight Used For Protein Calculations: 75.2 kg (165 lb 12.6 oz)  Protein Requirements: 75-90g (1.0-1.2g/kg)       RDA Method (mL): 1880       Nutrition by Nursing  Diet/Nutrition Received: mechanical/dental soft  Intake (%): 25%  Diet/Feeding Assistance: total feed  Diet/Feeding Tolerance: good  Last Bowel Movement: 05/30/24                Nutrition Follow-Up  RD Follow-up?: Yes      Nutrition Discharge Planning: patient to return to care center on dc; continue diet per SLP jericho Lyon, MS, RD, LD  Available via Secure Chat

## 2024-05-31 NOTE — HPI
Per ICU team: (5/27/24)    84 yo M with CVA w/ expressive aphasia and residual R sided weakness, HTN, prostate cancer s/p prostatectomy (2004),  FTT, seizure disorder who resides at [facility] (since 10/2023) presented 05/27 with DASH found to be in respiratory distress with admission to ICU for management of hypertensive emergency.      History was obtained from records as patient is a poor historian at baseline.  Patient presented from his nursing home with acute respiratory distress.  There was concern for an aspiration event.  On presentation to the ED he was noted to be hypoxic with SpO2 93%   Associated with increased work of breathing.  A chest x-ray demonstrated hazy opacification of bilateral lungs. He was recently admitted 04/30-05/03 with hypernatremia 2/2 hypovolemia which was attributed to his decreased PO intake.   ED course with patient receiving vancomycin IV x1, Zosyn IV x1 and NS IV x1L. On my assessment, recommended diuresis with Lasix IV, NIV settings were changed and started him on a nitro glycerin drip.

## 2024-05-31 NOTE — ASSESSMENT & PLAN NOTE
Troponin elevated sec to hypertensive emergency.  Cardiology followed, no ischemic workup recommended.

## 2024-05-31 NOTE — ASSESSMENT & PLAN NOTE
Echo    Result Date: 5/28/2024    Left Ventricle: The left ventricle is normal in size. Moderately   increased wall thickness. Regional wall motion abnormalities present.   Inferior wall hypokinesis There is low normal systolic function with a   visually estimated ejection fraction of 50 - 55%. Ejection fraction by   visual approximation is 50%.    Right Ventricle: Mild right ventricular enlargement.    Aortic Valve: The aortic valve is a trileaflet valve. There is mild   aortic valve sclerosis. Mildly calcified cusps.    Mitral Valve: There is mild bileaflet sclerosis. Mildly thickened   leaflets. There is severe regurgitation with a centrally directed jet.    IVC/SVC: Normal venous pressure at 3 mmHg.    Pericardium: There is a trivial effusion.       Cardiology followed.  Diuresed initially but held due to DENYS.  Continue carvedilol, resume Lasix once Cr improved.

## 2024-05-31 NOTE — HOSPITAL COURSE
Admitted to ICU on 5/27 for acute respiratory failure requiring BIPAP sec to acute pulmonary edema from hypertensive emergency. Found to have elevated troponin from HTN emergency and possibly with acute diastolic heart failure requiring Lasix infusion. Echo showed severe MR. Cardiology followed. Course complicated with Afib RVR requiring adjustment of home beta blocker dose. Patient developed DENYS on CKD stage 3 likely sec to diuresis. Patient was eventually weaned off BIPAP to NC, Lasix was held due to DENYS. He was transferred to floor on 5/30 and hospitalist assumed care on 5/31.     5/31- BP controlled, developed atrial fibrillation with RVR again requiring IV Lopressor 5mg x one dose. Na trending up.   6/1- Gentle hydration started, metoprolol dose increased for HR control.   6/2- Off hydration as clinically appeared slightly volume overloaded, one dose of IV Lasix given. CXR and oxygenation stable.   6/3- Worsening lung exam, concerning for aspiration. SLP consulted. BUN rising, Cr stable.    6/4 long discussion with wife this morning.  She states it would not be in line with her 's wishes to have artificial nutrition. family wishing  To transitioned to hospice care.  6/5   Case management working on hospice services at the nursing home    6/5 hospice able to assume care at nursing home.  He is stable for discharge

## 2024-05-31 NOTE — SUBJECTIVE & OBJECTIVE
"Interval History: Patient seen today, currently SR on tele, rate 62. Increased BIDIL to max dose, 2 tabs TID. Continue Coreg 6.25 BID.    Review of Systems   Reason unable to perform ROS: unable to obtain, aphasia from previous CVA.     Objective:     Vital Signs (Most Recent):  Temp: 97.9 °F (36.6 °C) (05/30/24 1953)  Pulse: 79 (05/30/24 1953)  Resp: 16 (05/30/24 1953)  BP: (!) 152/60 (05/30/24 1737)  SpO2: 100 % (05/30/24 1953) Vital Signs (24h Range):  Temp:  [96.8 °F (36 °C)-98.6 °F (37 °C)] 97.9 °F (36.6 °C)  Pulse:  [] 79  Resp:  [9-22] 16  SpO2:  [93 %-100 %] 100 %  BP: ()/() 152/60     Weight: 72.3 kg (159 lb 6.3 oz)  Body mass index is 21.62 kg/m².     SpO2: 100 %         Intake/Output Summary (Last 24 hours) at 5/30/2024 1954  Last data filed at 5/30/2024 1110  Gross per 24 hour   Intake --   Output 350 ml   Net -350 ml       Lines/Drains/Airways       Drain  Duration                  Urethral Catheter 05/27/24 1830 Coude 16 Fr. 3 days              Peripheral Intravenous Line  Duration                  Peripheral IV - Single Lumen 05/27/24 1012 20 G Anterior;Distal;Right Forearm 3 days         Peripheral IV - Single Lumen 05/28/24 0715 20 G;2 in;1/2 in Anterior;Left Forearm 2 days                       Physical Exam  Vitals reviewed.   Constitutional:       General: He is not in acute distress.  Cardiovascular:      Rate and Rhythm: Normal rate and regular rhythm.      Heart sounds: Murmur heard.   Pulmonary:      Effort: Pulmonary effort is normal.      Breath sounds: No wheezing or rales.   Abdominal:      General: Bowel sounds are normal.      Palpations: Abdomen is soft.   Musculoskeletal:      Right lower leg: Edema present.      Left lower leg: Edema present.   Skin:     General: Skin is warm and dry.   Neurological:      Mental Status: He is alert. Mental status is at baseline.            Significant Labs: ABG: No results for input(s): "PH", "PCO2", "HCO3", "POCSATURATED", "BE" in " "the last 48 hours., Blood Culture: No results for input(s): "LABBLOO" in the last 48 hours., BMP:   Recent Labs   Lab 05/29/24  0313 05/30/24  0401   GLU 83 78    145   K 5.0 5.1   * 122*   CO2 19* 17*   BUN 63* 65*   CREATININE 2.33* 2.42*   CALCIUM 8.8 8.7   , CMP   Recent Labs   Lab 05/29/24  0313 05/30/24  0401    145   K 5.0 5.1   * 122*   CO2 19* 17*   GLU 83 78   BUN 63* 65*   CREATININE 2.33* 2.42*   CALCIUM 8.8 8.7   PROT 6.5 5.9*   ALBUMIN 2.5* 2.2*   BILITOT 0.4 0.5   ALKPHOS 87 84   AST 46* 41*   ALT 67* 57   ANIONGAP 11 11   , CBC   Recent Labs   Lab 05/30/24  1037   WBC 7.37   HGB 9.9*   HCT 32.7*   *   , Lipid Panel No results for input(s): "CHOL", "HDL", "LDLCALC", "TRIG", "CHOLHDL" in the last 48 hours., and Troponin No results for input(s): "TROPONINI" in the last 48 hours.    Significant Imaging: Echocardiogram: Transthoracic echo (TTE) complete (Cupid Only):   Results for orders placed or performed during the hospital encounter of 05/27/24   Echo   Result Value Ref Range    BSA 1.95 m2    EF 50 %    Est. RA pres 3 mmHg    LVIDd 4.03 3.5 - 6.0 cm    LVIDs 3.06 2.1 - 4.0 cm    IVS 1.88 (A) 0.6 - 1.1 cm    FS 24 (A) 28 - 44 %    Left Ventricle Relative Wall Thickness 0.78 cm    Posterior Wall 1.57 (A) 0.6 - 1.1 cm    LV mass 294.34 g    LV Mass Index 149 g/m2    RVDD 3.96 cm    RV/LV Ratio 0.98 cm    LA size 3.60 cm    RA Width 3.5 cm    TV Regurgitant Volume 15.0 cc    Ao root annulus 3.08 cm    IVC diameter 1.08 cm    ZLVIDS -1.02     ZLVIDD -3.40     LVOT stroke volume 50.47 cm3    LV Systolic Volume 36.85 mL    LV Systolic Volume Index 18.7 mL/m2    LV Diastolic Volume 71.13 mL    LV Diastolic Volume Index 36.11 mL/m2    LVOT diameter 1.98 cm    LVOT area 3.1 cm2    MV Peak E Asael 1.06 m/s    TDI LATERAL 0.08 m/s    TDI SEPTAL 0.06 m/s    E/E' ratio 15.14 m/s    MV Peak A Asael 0.67 m/s    TR Max Asael 1.93 m/s    E/A ratio 1.58     E wave deceleration time 139.70 msec "    LV SEPTAL E/E' RATIO 17.67 m/s    LV LATERAL E/E' RATIO 13.25 m/s    LVOT peak jerry 0.84 m/s    Left Ventricular Outflow Tract Mean Velocity 0.54 cm/s    Left Ventricular Outflow Tract Mean Gradient 1.39 mmHg    TAPSE 1.69 cm    Left Atrium Major Axis 3.84 cm    RA Major Axis 3.52 cm    AV mean gradient 4 mmHg    AV peak gradient 5 mmHg    Ao peak jerry 1.12 m/s    Ao VTI 20.80 cm    LVOT peak VTI 16.40 cm    AV valve area 2.43 cm²    AV Velocity Ratio 0.75     AV index (prosthetic) 0.79     MEAGAN by Velocity Ratio 2.31 cm²    Mr max jerry 5.50 m/s    MV mean gradient 3 mmHg    MV peak gradient 8 mmHg    MV stenosis pressure 1/2 time 34.67 ms    MV valve area p 1/2 method 6.35 cm2    MV valve area by continuity eq 1.82 cm2    MV VTI 27.7 cm    TV resting pulmonary artery pressure 18 mmHg    RV TB RVSP 5 mmHg    Triscuspid Valve Regurgitation Peak Gradient 15 mmHg    PV PEAK VELOCITY 0.61 m/s    PV peak gradient 1 mmHg    Mean e' 0.07 m/s    AORTIC VALVE CUSP SEPERATION 2.00 cm    Narrative      Left Ventricle: The left ventricle is normal in size. Moderately   increased wall thickness. Regional wall motion abnormalities present.   Inferior wall hypokinesis There is low normal systolic function with a   visually estimated ejection fraction of 50 - 55%. Ejection fraction by   visual approximation is 50%.    Right Ventricle: Mild right ventricular enlargement.    Aortic Valve: The aortic valve is a trileaflet valve. There is mild   aortic valve sclerosis. Mildly calcified cusps.    Mitral Valve: There is mild bileaflet sclerosis. Mildly thickened   leaflets. There is severe regurgitation with a centrally directed jet.    IVC/SVC: Normal venous pressure at 3 mmHg.    Pericardium: There is a trivial effusion.

## 2024-05-31 NOTE — ASSESSMENT & PLAN NOTE
Patient has hypernatremia which is controlled. The hypernatremia is due to Dehydration. We will aim to correct the sodium by 8-10mEq in 24 hours. We will correct their hypernatremia with Select IV fluids: monitor without intervention, if worsens then will add low rate of D5W . The patient's sodium results have been reviewed and are listed below.  Recent Labs   Lab 05/31/24  0637   *

## 2024-05-31 NOTE — ASSESSMENT & PLAN NOTE
Patient with dementia with likely etiology of vascular dementia. Dementia is severe. The patient does not have signs of behavioral disturbance. Home dementia medications are Held or Continued: continued.. Continue non-pharmacologic interventions to prevent delirium (No VS between 11PM-5AM, activity during day, opening blinds, providing glasses/hearing aids, and up in chair during daytime). Will avoid narcotics and benzos unless absolutely necessary. PRN anti-psychotics are not prescribed to avoid self harm behaviors.  History of CVA-  resume Eliquis and Plavix (off aspirin to avoid triple therapy), statin.

## 2024-05-31 NOTE — PROGRESS NOTES
[facility]  Cardiology  Progress Note    Patient Name: Claude Patrick  MRN: [id number]  Admission Date: 5/27/2024  Hospital Length of Stay: 3 days  Code Status: DNR   Attending Physician: Margie Medley MD   Primary Care Physician: Tita Primary Doctor  Expected Discharge Date: 6/11/2024  Principal Problem:Hypertensive emergency    Subjective:     Hospital Course:   No notes on file    Interval History: Patient seen today, currently SR on tele, rate 62. Increased BIDIL to max dose, 2 tabs TID. Continue Coreg 6.25 BID.    Review of Systems   Reason unable to perform ROS: unable to obtain, aphasia from previous CVA.     Objective:     Vital Signs (Most Recent):  Temp: 97.9 °F (36.6 °C) (05/30/24 1953)  Pulse: 79 (05/30/24 1953)  Resp: 16 (05/30/24 1953)  BP: (!) 152/60 (05/30/24 1737)  SpO2: 100 % (05/30/24 1953) Vital Signs (24h Range):  Temp:  [96.8 °F (36 °C)-98.6 °F (37 °C)] 97.9 °F (36.6 °C)  Pulse:  [] 79  Resp:  [9-22] 16  SpO2:  [93 %-100 %] 100 %  BP: ()/() 152/60     Weight: 72.3 kg (159 lb 6.3 oz)  Body mass index is 21.62 kg/m².     SpO2: 100 %         Intake/Output Summary (Last 24 hours) at 5/30/2024 1954  Last data filed at 5/30/2024 1110  Gross per 24 hour   Intake --   Output 350 ml   Net -350 ml       Lines/Drains/Airways       Drain  Duration                  Urethral Catheter 05/27/24 1830 Coude 16 Fr. 3 days              Peripheral Intravenous Line  Duration                  Peripheral IV - Single Lumen 05/27/24 1012 20 G Anterior;Distal;Right Forearm 3 days         Peripheral IV - Single Lumen 05/28/24 0715 20 G;2 in;1/2 in Anterior;Left Forearm 2 days                       Physical Exam  Vitals reviewed.   Constitutional:       General: He is not in acute distress.  Cardiovascular:      Rate and Rhythm: Normal rate and regular rhythm.      Heart sounds: Murmur heard.   Pulmonary:      Effort: Pulmonary effort is normal.      Breath sounds: No wheezing or rales.  "  Abdominal:      General: Bowel sounds are normal.      Palpations: Abdomen is soft.   Musculoskeletal:      Right lower leg: Edema present.      Left lower leg: Edema present.   Skin:     General: Skin is warm and dry.   Neurological:      Mental Status: He is alert. Mental status is at baseline.            Significant Labs: ABG: No results for input(s): "PH", "PCO2", "HCO3", "POCSATURATED", "BE" in the last 48 hours., Blood Culture: No results for input(s): "LABBLOO" in the last 48 hours., BMP:   Recent Labs   Lab 05/29/24  0313 05/30/24  0401   GLU 83 78    145   K 5.0 5.1   * 122*   CO2 19* 17*   BUN 63* 65*   CREATININE 2.33* 2.42*   CALCIUM 8.8 8.7   , CMP   Recent Labs   Lab 05/29/24  0313 05/30/24  0401    145   K 5.0 5.1   * 122*   CO2 19* 17*   GLU 83 78   BUN 63* 65*   CREATININE 2.33* 2.42*   CALCIUM 8.8 8.7   PROT 6.5 5.9*   ALBUMIN 2.5* 2.2*   BILITOT 0.4 0.5   ALKPHOS 87 84   AST 46* 41*   ALT 67* 57   ANIONGAP 11 11   , CBC   Recent Labs   Lab 05/30/24  1037   WBC 7.37   HGB 9.9*   HCT 32.7*   *   , Lipid Panel No results for input(s): "CHOL", "HDL", "LDLCALC", "TRIG", "CHOLHDL" in the last 48 hours., and Troponin No results for input(s): "TROPONINI" in the last 48 hours.    Significant Imaging: Echocardiogram: Transthoracic echo (TTE) complete (Cupid Only):   Results for orders placed or performed during the hospital encounter of 05/27/24   Echo   Result Value Ref Range    BSA 1.95 m2    EF 50 %    Est. RA pres 3 mmHg    LVIDd 4.03 3.5 - 6.0 cm    LVIDs 3.06 2.1 - 4.0 cm    IVS 1.88 (A) 0.6 - 1.1 cm    FS 24 (A) 28 - 44 %    Left Ventricle Relative Wall Thickness 0.78 cm    Posterior Wall 1.57 (A) 0.6 - 1.1 cm    LV mass 294.34 g    LV Mass Index 149 g/m2    RVDD 3.96 cm    RV/LV Ratio 0.98 cm    LA size 3.60 cm    RA Width 3.5 cm    TV Regurgitant Volume 15.0 cc    Ao root annulus 3.08 cm    IVC diameter 1.08 cm    ZLVIDS -1.02     ZLVIDD -3.40     LVOT stroke volume " 50.47 cm3    LV Systolic Volume 36.85 mL    LV Systolic Volume Index 18.7 mL/m2    LV Diastolic Volume 71.13 mL    LV Diastolic Volume Index 36.11 mL/m2    LVOT diameter 1.98 cm    LVOT area 3.1 cm2    MV Peak E Asael 1.06 m/s    TDI LATERAL 0.08 m/s    TDI SEPTAL 0.06 m/s    E/E' ratio 15.14 m/s    MV Peak A Asael 0.67 m/s    TR Max Asale 1.93 m/s    E/A ratio 1.58     E wave deceleration time 139.70 msec    LV SEPTAL E/E' RATIO 17.67 m/s    LV LATERAL E/E' RATIO 13.25 m/s    LVOT peak asael 0.84 m/s    Left Ventricular Outflow Tract Mean Velocity 0.54 cm/s    Left Ventricular Outflow Tract Mean Gradient 1.39 mmHg    TAPSE 1.69 cm    Left Atrium Major Axis 3.84 cm    RA Major Axis 3.52 cm    AV mean gradient 4 mmHg    AV peak gradient 5 mmHg    Ao peak asael 1.12 m/s    Ao VTI 20.80 cm    LVOT peak VTI 16.40 cm    AV valve area 2.43 cm²    AV Velocity Ratio 0.75     AV index (prosthetic) 0.79     MEAGAN by Velocity Ratio 2.31 cm²    Mr max asael 5.50 m/s    MV mean gradient 3 mmHg    MV peak gradient 8 mmHg    MV stenosis pressure 1/2 time 34.67 ms    MV valve area p 1/2 method 6.35 cm2    MV valve area by continuity eq 1.82 cm2    MV VTI 27.7 cm    TV resting pulmonary artery pressure 18 mmHg    RV TB RVSP 5 mmHg    Triscuspid Valve Regurgitation Peak Gradient 15 mmHg    PV PEAK VELOCITY 0.61 m/s    PV peak gradient 1 mmHg    Mean e' 0.07 m/s    AORTIC VALVE CUSP SEPERATION 2.00 cm    Narrative      Left Ventricle: The left ventricle is normal in size. Moderately   increased wall thickness. Regional wall motion abnormalities present.   Inferior wall hypokinesis There is low normal systolic function with a   visually estimated ejection fraction of 50 - 55%. Ejection fraction by   visual approximation is 50%.    Right Ventricle: Mild right ventricular enlargement.    Aortic Valve: The aortic valve is a trileaflet valve. There is mild   aortic valve sclerosis. Mildly calcified cusps.    Mitral Valve: There is mild bileaflet  sclerosis. Mildly thickened   leaflets. There is severe regurgitation with a centrally directed jet.    IVC/SVC: Normal venous pressure at 3 mmHg.    Pericardium: There is a trivial effusion.       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      History was obtained from records as patient is a poor historian at baseline.  Patient presented from his nursing home with acute respiratory distress.  There was concern for an aspiration event.  On presentation to the ED he was noted to be hypoxic with SpO2 93%   Associated with increased work of breathing.  A chest x-ray demonstrated hazy opacification of bilateral lungs. He was recently admitted 04/30-05/03 with hypernatremia 2/2 hypovolemia which was attributed to his decreased PO intake.     5/28/2024:  Cardiology consulted for severe MR, flash pulmonary edema. Patient seen today, awake and alert, but not offering any answers to yes/no questions. No family at bedside.    * Hypertensive emergency  - Currently on IV nitro, being followed by critical care team  - Have added Bidil and coreg for blood pressure and heart rate control as well as afterload reduction in setting of severe MR. Wean off nitro gtt as BP allows    5/29/2024:  - No longer on Nitro gtt, BP better controlled, coreg increased today by primary team    Atrial fibrillation with rapid ventricular response  5/29/2024:  - Coreg increased today for better rate control  - Hx of CVA, will start Eliquis 2.5mg BID today for stroke prophylaxis (discontinuing asa, pt on plavix)    Elevated troponin  - Troponin 378, 319; EKG afib with rvr, rate 141 and no acute ischemic changes  - Echo with EF  50-55%, inferior wall hypokinesis, and severe MR  - This does not appear to be ACS, troponin flat, and denies chest pain; likely Type 2 MI in setting of heart failure with hypertensive urgency and severe MR  - Continue home asa, plavix and statin (previous CVAs)    5/29/2025:  - Dc'd asa, starting Eliquis for stroke prophylaxis with afib    Severe mitral regurgitation  - Patient seen and evaluated by Dr. Kauffman  - Previous echo at [facility] in 2020 with normal EF, trace MR, and no segmental wall motion abnormality  - Repeat echo with EF 50-55%, regional wall motion abnormalities, inferior wall hypokinesis, and severe MR  - Recommend medical management at this time, afterload reduction initiated (Bidil, no ace/arb due to CKD). Continue diuresis. Can consider referral to structural cardiology if unable to manage medically and family wishes.  - Continue monitoring    5/29/2024:  - Patient seen and evaluated by Dr. Kauffman  - Continue afterload reduction with BIDIL, no ACE/ARB due to CKD (creatinine trending up 2.33 today)    5/30/2024:  - Patient seen and evaluated by Dr. Kauffman  - Increased BIDIL to max dose, 2 tabs PO TID for afterload reduction  - Per critical care note; -- 05/29: discussed with wife management of MR with options discussed including surgical referral vs interventional cards for management vs medical management; opted for medical management with new recurrence of hypertension; will follow up outpatient with repeat TTE to guide future treatment considerations   - Cardiology will sign off at this time, please call if any further assistance is needed  - Follow up in cardiology clinic 2 weeks from discharge    Acute on chronic diastolic heart failure  - Patient seen and evaluated by Dr. Kauffman  - Echo with EF 50-55%, severe MR  - Agree with diuresis, afterload reduction added   - Continue monitoring    5/29/2024:  - Has been transitioned to PO lasix by primary team  - Continue BIDIL,  Coreg    5/30/2024:  - Cardiology will sign off at this time, please call if any further assistance is needed  - Follow up with cardiology in 2 weeks        VTE Risk Mitigation (From admission, onward)           Ordered     apixaban tablet 2.5 mg  2 times daily         05/29/24 1845     IP VTE HIGH RISK PATIENT  Once         05/27/24 1436     Place sequential compression device  Until discontinued         05/27/24 1436                    GLENROY Whitaker  Cardiology  [facility]

## 2024-05-31 NOTE — ASSESSMENT & PLAN NOTE
Sec to flash pulmonary edema from HTN emergency.  Required BIPAP on admission and ICU stay, transferred to floor after weaning to NC 2L.  Stable resp status now.

## 2024-05-31 NOTE — ASSESSMENT & PLAN NOTE
Patient with acute kidney injury/acute renal failure likely due to pre-renal azotemia due to IVVD DENYS is currently worsening.   Baseline creatinine  ~ 2  - Labs reviewed- Renal function/electrolytes with Estimated Creatinine Clearance: 23.4 mL/min (A) (based on SCr of 2.53 mg/dL (H)). according to latest data. Monitor urine output and serial BMP and adjust therapy as needed. Avoid nephrotoxins and renally dose meds for GFR listed above.

## 2024-05-31 NOTE — ASSESSMENT & PLAN NOTE
- Patient seen and evaluated by Dr. Kauffman  - Previous echo at [facility] in 2020 with normal EF, trace MR, and no segmental wall motion abnormality  - Repeat echo with EF 50-55%, regional wall motion abnormalities, inferior wall hypokinesis, and severe MR  - Recommend medical management at this time, afterload reduction initiated (Bidil, no ace/arb due to CKD). Continue diuresis. Can consider referral to structural cardiology if unable to manage medically and family wishes.  - Continue monitoring    5/29/2024:  - Patient seen and evaluated by Dr. Kauffman  - Continue afterload reduction with BIDIL, no ACE/ARB due to CKD (creatinine trending up 2.33 today)    5/30/2024:  - Patient seen and evaluated by Dr. Kauffman  - Increased BIDIL to max dose, 2 tabs PO TID for afterload reduction  - Per critical care note; -- 05/29: discussed with wife management of MR with options discussed including surgical referral vs interventional cards for management vs medical management; opted for medical management with new recurrence of hypertension; will follow up outpatient with repeat TTE to guide future treatment considerations   - Cardiology will sign off at this time, please call if any further assistance is needed  - Follow up in cardiology clinic 2 weeks from discharge

## 2024-05-31 NOTE — SUBJECTIVE & OBJECTIVE
Interval History: Patient seen and examined at the bedside, not much verbal, answers yes and no, follow some commands.     Review of Systems   Unable to perform ROS: Dementia     Objective:     Vital Signs (Most Recent):  Temp: 98.8 °F (37.1 °C) (05/31/24 0817)  Pulse: 96 (05/31/24 1123)  Resp: 18 (05/31/24 0817)  BP: 105/74 (05/31/24 1123)  SpO2: 97 % (05/31/24 1123) Vital Signs (24h Range):  Temp:  [96.8 °F (36 °C)-98.8 °F (37.1 °C)] 98.8 °F (37.1 °C)  Pulse:  [55-96] 96  Resp:  [9-22] 18  SpO2:  [95 %-100 %] 97 %  BP: ()/() 105/74     Weight: 72.3 kg (159 lb 6.3 oz)  Body mass index is 21.62 kg/m².    Intake/Output Summary (Last 24 hours) at 5/31/2024 1124  Last data filed at 5/31/2024 0440  Gross per 24 hour   Intake 240 ml   Output 400 ml   Net -160 ml         Physical Exam  Constitutional:       Appearance: He is ill-appearing.   Cardiovascular:      Rate and Rhythm: Tachycardia present. Rhythm irregular.      Heart sounds: Murmur heard.   Pulmonary:      Effort: Pulmonary effort is normal.      Breath sounds: Wheezing present.   Abdominal:      General: Bowel sounds are normal.      Palpations: Abdomen is soft.   Skin:     General: Skin is warm.   Neurological:      General: No focal deficit present.      Mental Status: He is easily aroused. Mental status is at baseline. He is disoriented.             Significant Labs: All pertinent labs within the past 24 hours have been reviewed.    Significant Imaging: I have reviewed all pertinent imaging results/findings within the past 24 hours.

## 2024-05-31 NOTE — PROGRESS NOTES
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      History was obtained from records as patient is a poor historian at baseline.  Patient presented from his nursing home with acute respiratory distress.  There was concern for an aspiration event.  On presentation to the ED he was noted to be hypoxic with SpO2 93%   Associated with increased work of breathing.  A chest x-ray demonstrated hazy opacification of bilateral lungs. He was recently admitted 04/30-05/03 with hypernatremia 2/2 hypovolemia which was attributed to his decreased PO intake.   ED course with patient receiving vancomycin IV x1, Zosyn IV x1 and NS IV x1L. On my assessment, recommended diuresis with Lasix IV, NIV settings were changed and started him on a nitro glycerin drip.    Overview/Hospital Course:  Admitted to ICU on 5/27 for acute respiratory failure requiring BIPAP sec to acute pulmonary edema from hypertensive emergency. Found to have elevated troponin from HTN emergency and possibly with acute diastolic heart failure requiring Lasix infusion. Echo showed severe MR. Cardiology followed. Course complicated with Afib RVR requiring adjustment of home beta blocker dose. Patient developed DENYS on CKD stage 3 likely sec to diuresis. Patient was eventually weaned off BIPAP to NC, Lasix  was held due to DENYS. He was transferred to floor on 5/30 and hospitalist assumed care on 5/31.     5/31- BP controlled, developed atrial fibrillation with RVR again requiring IV Lopressor 5mg x one dose. Na trending up.       Interval History: Patient seen and examined at the bedside, not much verbal, answers yes and no, follow some commands.     Review of Systems   Unable to perform ROS: Dementia     Objective:     Vital Signs (Most Recent):  Temp: 98.8 °F (37.1 °C) (05/31/24 0817)  Pulse: 96 (05/31/24 1123)  Resp: 18 (05/31/24 0817)  BP: 105/74 (05/31/24 1123)  SpO2: 97 % (05/31/24 1123) Vital Signs (24h Range):  Temp:  [96.8 °F (36 °C)-98.8 °F (37.1 °C)] 98.8 °F (37.1 °C)  Pulse:  [55-96] 96  Resp:  [9-22] 18  SpO2:  [95 %-100 %] 97 %  BP: ()/() 105/74     Weight: 72.3 kg (159 lb 6.3 oz)  Body mass index is 21.62 kg/m².    Intake/Output Summary (Last 24 hours) at 5/31/2024 1124  Last data filed at 5/31/2024 0440  Gross per 24 hour   Intake 240 ml   Output 400 ml   Net -160 ml         Physical Exam  Constitutional:       Appearance: He is ill-appearing.   Cardiovascular:      Rate and Rhythm: Tachycardia present. Rhythm irregular.      Heart sounds: Murmur heard.   Pulmonary:      Effort: Pulmonary effort is normal.      Breath sounds: Wheezing present.   Abdominal:      General: Bowel sounds are normal.      Palpations: Abdomen is soft.   Skin:     General: Skin is warm.   Neurological:      General: No focal deficit present.      Mental Status: He is easily aroused. Mental status is at baseline. He is disoriented.             Significant Labs: All pertinent labs within the past 24 hours have been reviewed.    Significant Imaging: I have reviewed all pertinent imaging results/findings within the past 24 hours.    Assessment/Plan:      * Acute respiratory failure with hypoxia  Sec to flash pulmonary edema from HTN emergency.  Required BIPAP on admission and ICU stay, transferred to floor after weaning  to NC 2L.  Stable resp status now.     Hypertensive emergency  Patient has a current diagnosis of Hypertensive emergency with end organ damage evidenced by acute kidney injury and acute heart failure which is controlled.  Latest blood pressure and vitals reviewed-   Temp:  [96.8 °F (36 °C)-98.8 °F (37.1 °C)]   Pulse:  []   Resp:  [9-22]   BP: ()/()   SpO2:  [95 %-100 %] .   Patient currently off IV antihypertensives.   Home meds for hypertension were reviewed and noted below.       Medication adjustment for hospital antihypertensives is as follows- resume current medications (started during this hospitalization): hydralazine, Imdur and carvedilol.         Atrial fibrillation with rapid ventricular response  Patient with Persistent (7 days or more) atrial fibrillation which is uncontrolled currently with Beta Blocker. Patient is currently in atrial fibrillation.[id number] Score: 2. HASBLED Score: 2. Anticoagulation indicated. Anticoagulation done with Eliquis .    Required IV Lopressor 5mg x one dose on 5/31 for better HR control.   Cardiology signed off.     Acute on chronic diastolic heart failure  Echo    Result Date: 5/28/2024    Left Ventricle: The left ventricle is normal in size. Moderately   increased wall thickness. Regional wall motion abnormalities present.   Inferior wall hypokinesis There is low normal systolic function with a   visually estimated ejection fraction of 50 - 55%. Ejection fraction by   visual approximation is 50%.    Right Ventricle: Mild right ventricular enlargement.    Aortic Valve: The aortic valve is a trileaflet valve. There is mild   aortic valve sclerosis. Mildly calcified cusps.    Mitral Valve: There is mild bileaflet sclerosis. Mildly thickened   leaflets. There is severe regurgitation with a centrally directed jet.    IVC/SVC: Normal venous pressure at 3 mmHg.    Pericardium: There is a trivial effusion.       Cardiology followed.  Diuresed initially but held  due to DENYS.  Continue carvedilol, resume Lasix once Cr improved.       DENYS (acute kidney injury)  Patient with acute kidney injury/acute renal failure likely due to pre-renal azotemia due to IVVD DENYS is currently worsening.   Baseline creatinine  ~ 2  - Labs reviewed- Renal function/electrolytes with Estimated Creatinine Clearance: 23.4 mL/min (A) (based on SCr of 2.53 mg/dL (H)). according to latest data. Monitor urine output and serial BMP and adjust therapy as needed. Avoid nephrotoxins and renally dose meds for GFR listed above.    Hyperkalemia  Resolved.   Recent Labs   Lab 05/31/24  0637   K 5.1             Type 2 myocardial infarction  Troponin elevated sec to hypertensive emergency.  Cardiology followed, no ischemic workup recommended.         Severe mitral regurgitation  Per echocardiogram- Mitral Valve: There is mild bileaflet sclerosis. Mildly thickened leaflets. There is severe regurgitation with a centrally directed jet.  Cardiology followed, recommend medical management at this time, afterload reduction initiated (Bidil, no ace/arb due to CKD). Continue diuresis when able. Can consider referral to structural cardiology if unable to manage medically and family wishes.         Hypernatremia  Patient has hypernatremia which is controlled. The hypernatremia is due to Dehydration. We will aim to correct the sodium by 8-10mEq in 24 hours. We will correct their hypernatremia with Select IV fluids: monitor without intervention, if worsens then will add low rate of D5W . The patient's sodium results have been reviewed and are listed below.  Recent Labs   Lab 05/31/24  0637   *       Seizure disorder  Resume home Keppra.       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 Dementia is severe. The patient does not have signs of behavioral disturbance. Home dementia medications  are Held or Continued: continued.. Continue non-pharmacologic interventions to prevent delirium (No VS between 11PM-5AM, activity during day, opening blinds, providing glasses/hearing aids, and up in chair during daytime). Will avoid narcotics and benzos unless absolutely necessary. PRN anti-psychotics are not prescribed to avoid self harm behaviors.  History of CVA-  resume Eliquis and Plavix (off aspirin to avoid triple therapy), statin.       VTE Risk Mitigation (From admission, onward)           Ordered     apixaban tablet 2.5 mg  2 times daily         05/29/24 1845     IP VTE HIGH RISK PATIENT  Once         05/27/24 1436     Place sequential compression device  Until discontinued         05/27/24 1436                    Discharge Planning   SHAYY: 6/3/2024     Code Status: DNR   Is the patient medically ready for discharge?:     Reason for patient still in hospital (select all that apply): Patient trending condition  Discharge Plan A: Return to nursing home                  FREDO COLLINS MD  Department of Hospital Medicine   [facility]

## 2024-06-01 PROBLEM — E87.5 HYPERKALEMIA: Status: RESOLVED | Noted: 2024-05-27 | Resolved: 2024-06-01

## 2024-06-01 LAB
ALBUMIN SERPL BCP-MCNC: 2.4 G/DL (ref 3.5–5)
ALBUMIN/GLOB SERPL: 0.6 {RATIO}
ALP SERPL-CCNC: 73 U/L (ref 45–115)
ALT SERPL W P-5'-P-CCNC: 40 U/L (ref 16–61)
ANION GAP SERPL CALCULATED.3IONS-SCNC: 10 MMOL/L (ref 7–16)
AST SERPL W P-5'-P-CCNC: 23 U/L (ref 15–37)
BILIRUB SERPL-MCNC: 0.5 MG/DL (ref ?–1.2)
BUN SERPL-MCNC: 74 MG/DL (ref 7–18)
BUN/CREAT SERPL: 30 (ref 6–20)
CALCIUM SERPL-MCNC: 8.9 MG/DL (ref 8.5–10.1)
CHLORIDE SERPL-SCNC: 125 MMOL/L (ref 98–107)
CO2 SERPL-SCNC: 19 MMOL/L (ref 21–32)
CREAT SERPL-MCNC: 2.49 MG/DL (ref 0.7–1.3)
EGFR (NO RACE VARIABLE) (RUSH/TITUS): 25 ML/MIN/1.73M2
GLOBULIN SER-MCNC: 3.7 G/DL (ref 2–4)
GLUCOSE SERPL-MCNC: 113 MG/DL (ref 74–106)
GLUCOSE SERPL-MCNC: 118 MG/DL (ref 70–105)
GLUCOSE SERPL-MCNC: 125 MG/DL (ref 70–105)
GLUCOSE SERPL-MCNC: 134 MG/DL (ref 70–105)
GLUCOSE SERPL-MCNC: 181 MG/DL (ref 70–105)
POTASSIUM SERPL-SCNC: 4.8 MMOL/L (ref 3.5–5.1)
PROT SERPL-MCNC: 6.1 G/DL (ref 6.4–8.2)
SODIUM SERPL-SCNC: 149 MMOL/L (ref 136–145)

## 2024-06-01 PROCEDURE — 25000003 PHARM REV CODE 250: Performed by: STUDENT IN AN ORGANIZED HEALTH CARE EDUCATION/TRAINING PROGRAM

## 2024-06-01 PROCEDURE — 25000242 PHARM REV CODE 250 ALT 637 W/ HCPCS: Performed by: INTERNAL MEDICINE

## 2024-06-01 PROCEDURE — 94640 AIRWAY INHALATION TREATMENT: CPT

## 2024-06-01 PROCEDURE — 80053 COMPREHEN METABOLIC PANEL: CPT | Performed by: INTERNAL MEDICINE

## 2024-06-01 PROCEDURE — 11000001 HC ACUTE MED/SURG PRIVATE ROOM

## 2024-06-01 PROCEDURE — 25000003 PHARM REV CODE 250: Performed by: NURSE PRACTITIONER

## 2024-06-01 PROCEDURE — 99232 SBSQ HOSP IP/OBS MODERATE 35: CPT | Mod: ,,, | Performed by: INTERNAL MEDICINE

## 2024-06-01 PROCEDURE — 25000003 PHARM REV CODE 250: Performed by: INTERNAL MEDICINE

## 2024-06-01 PROCEDURE — 99900035 HC TECH TIME PER 15 MIN (STAT)

## 2024-06-01 PROCEDURE — 63600175 PHARM REV CODE 636 W HCPCS: Performed by: STUDENT IN AN ORGANIZED HEALTH CARE EDUCATION/TRAINING PROGRAM

## 2024-06-01 PROCEDURE — 94761 N-INVAS EAR/PLS OXIMETRY MLT: CPT

## 2024-06-01 PROCEDURE — 36415 COLL VENOUS BLD VENIPUNCTURE: CPT | Performed by: INTERNAL MEDICINE

## 2024-06-01 PROCEDURE — 25000242 PHARM REV CODE 250 ALT 637 W/ HCPCS: Performed by: STUDENT IN AN ORGANIZED HEALTH CARE EDUCATION/TRAINING PROGRAM

## 2024-06-01 PROCEDURE — 82962 GLUCOSE BLOOD TEST: CPT

## 2024-06-01 RX ORDER — METOPROLOL TARTRATE 50 MG/1
50 TABLET ORAL EVERY 6 HOURS
Status: DISCONTINUED | OUTPATIENT
Start: 2024-06-01 | End: 2024-06-05 | Stop reason: HOSPADM

## 2024-06-01 RX ORDER — METOPROLOL TARTRATE 1 MG/ML
5 INJECTION, SOLUTION INTRAVENOUS EVERY 6 HOURS PRN
Status: DISCONTINUED | OUTPATIENT
Start: 2024-06-01 | End: 2024-06-05 | Stop reason: HOSPADM

## 2024-06-01 RX ORDER — DEXTROSE MONOHYDRATE 50 MG/ML
INJECTION, SOLUTION INTRAVENOUS CONTINUOUS
Status: DISPENSED | OUTPATIENT
Start: 2024-06-01 | End: 2024-06-02

## 2024-06-01 RX ADMIN — DEXTROSE MONOHYDRATE: 50 INJECTION, SOLUTION INTRAVENOUS at 08:06

## 2024-06-01 RX ADMIN — HYDRALAZINE HYDROCHLORIDE: 10 TABLET, FILM COATED ORAL at 08:06

## 2024-06-01 RX ADMIN — IPRATROPIUM BROMIDE AND ALBUTEROL SULFATE 3 ML: 2.5; .5 SOLUTION RESPIRATORY (INHALATION) at 03:06

## 2024-06-01 RX ADMIN — METOPROLOL TARTRATE 50 MG: 50 TABLET, FILM COATED ORAL at 11:06

## 2024-06-01 RX ADMIN — PREDNISONE 50 MG: 10 TABLET ORAL at 08:06

## 2024-06-01 RX ADMIN — PRAVASTATIN SODIUM 80 MG: 40 TABLET ORAL at 08:06

## 2024-06-01 RX ADMIN — CLOPIDOGREL BISULFATE 75 MG: 75 TABLET ORAL at 08:06

## 2024-06-01 RX ADMIN — MUPIROCIN: 20 OINTMENT TOPICAL at 08:06

## 2024-06-01 RX ADMIN — PANTOPRAZOLE SODIUM 40 MG: 40 GRANULE, DELAYED RELEASE ORAL at 08:06

## 2024-06-01 RX ADMIN — IPRATROPIUM BROMIDE AND ALBUTEROL SULFATE 3 ML: .5; 3 SOLUTION RESPIRATORY (INHALATION) at 06:06

## 2024-06-01 RX ADMIN — METOPROLOL TARTRATE 50 MG: 50 TABLET, FILM COATED ORAL at 06:06

## 2024-06-01 RX ADMIN — LEVETIRACETAM 500 MG: 500 SOLUTION ORAL at 09:06

## 2024-06-01 RX ADMIN — LEVETIRACETAM 500 MG: 500 SOLUTION ORAL at 08:06

## 2024-06-01 RX ADMIN — APIXABAN 2.5 MG: 2.5 TABLET, FILM COATED ORAL at 08:06

## 2024-06-01 RX ADMIN — HYDRALAZINE HYDROCHLORIDE: 10 TABLET, FILM COATED ORAL at 02:06

## 2024-06-01 RX ADMIN — IPRATROPIUM BROMIDE AND ALBUTEROL SULFATE 3 ML: 2.5; .5 SOLUTION RESPIRATORY (INHALATION) at 07:06

## 2024-06-01 RX ADMIN — MIRTAZAPINE 15 MG: 15 TABLET, FILM COATED ORAL at 08:06

## 2024-06-01 NOTE — ASSESSMENT & PLAN NOTE
Patient with acute kidney injury/acute renal failure likely due to pre-renal azotemia due to IVVD   DENYS is currently stable.   Baseline creatinine  ~ 2  - Labs reviewed- Renal function/electrolytes with Estimated Creatinine Clearance: 23.8 mL/min (A) (based on SCr of 2.49 mg/dL (H)). according to latest data.   Gentle IV hydration.   Monitor urine output and serial BMP and adjust therapy as needed. Avoid nephrotoxins and renally dose meds for GFR listed above.

## 2024-06-01 NOTE — ASSESSMENT & PLAN NOTE
Patient has a current diagnosis of Hypertensive emergency with end organ damage evidenced by acute kidney injury and acute heart failure which is controlled.  Latest blood pressure and vitals reviewed-   Temp:  [97.4 °F (36.3 °C)-98.2 °F (36.8 °C)]   Pulse:  []   Resp:  [16-20]   BP: ()/(59-83)   SpO2:  [95 %-99 %] .   Patient currently off IV antihypertensives.   Home meds for hypertension were reviewed.  Medication adjustment for hospital antihypertensives is as follows- resume current medications (started during this hospitalization): hydralazine, Imdur and carvedilol.

## 2024-06-01 NOTE — ASSESSMENT & PLAN NOTE
Patient with Persistent (7 days or more) atrial fibrillation which is uncontrolled currently with Beta Blocker. Patient is currently in atrial fibrillation.[id number] Score: 2. HASBLED Score: 2. Anticoagulation indicated. Anticoagulation done with Eliquis .    Required IV Lopressor 5mg x 2 doses on 5/31 for better HR control.  Changed home carvedilol to metoprolol and dose increased for better HR control.    Cardiology signed off.

## 2024-06-01 NOTE — SUBJECTIVE & OBJECTIVE
Interval History: Patient seen and examined at the bedside, not much verbal, answers yes and no, follow some commands.     Review of Systems   Unable to perform ROS: Dementia     Objective:     Vital Signs (Most Recent):  Temp: 97.7 °F (36.5 °C) (06/01/24 0814)  Pulse: 98 (06/01/24 0814)  Resp: 18 (06/01/24 0814)  BP: 128/77 (06/01/24 0814)  SpO2: 96 % (06/01/24 0814) Vital Signs (24h Range):  Temp:  [97.4 °F (36.3 °C)-98.2 °F (36.8 °C)] 97.7 °F (36.5 °C)  Pulse:  [] 98  Resp:  [16-20] 18  SpO2:  [95 %-99 %] 96 %  BP: ()/(59-83) 128/77     Weight: 72.3 kg (159 lb 6.3 oz)  Body mass index is 21.62 kg/m².    Intake/Output Summary (Last 24 hours) at 6/1/2024 1006  Last data filed at 6/1/2024 0745  Gross per 24 hour   Intake 584 ml   Output 200 ml   Net 384 ml         Physical Exam  Constitutional:       Appearance: He is ill-appearing.   Cardiovascular:      Rate and Rhythm: Tachycardia present. Rhythm irregular.      Heart sounds: Murmur heard.   Pulmonary:      Effort: Pulmonary effort is normal.      Breath sounds: Wheezing present.   Abdominal:      General: Bowel sounds are normal.      Palpations: Abdomen is soft.   Skin:     General: Skin is warm.   Neurological:      General: No focal deficit present.      Mental Status: He is easily aroused. Mental status is at baseline. He is disoriented.             Significant Labs: All pertinent labs within the past 24 hours have been reviewed.    Significant Imaging: I have reviewed all pertinent imaging results/findings within the past 24 hours.

## 2024-06-01 NOTE — ASSESSMENT & PLAN NOTE
Patient has hypernatremia which is controlled. The hypernatremia is due to Dehydration. We will aim to correct the sodium by 8-10mEq in 24 hours. We will correct their hypernatremia with D5W at 50cc/hr x 24 hours (free water deficit ~ 1.5L).   The patient's sodium results have been reviewed and are listed below.  Recent Labs   Lab 06/01/24  0555   *

## 2024-06-01 NOTE — ASSESSMENT & PLAN NOTE
Echo    Result Date: 5/28/2024    Left Ventricle: The left ventricle is normal in size. Moderately   increased wall thickness. Regional wall motion abnormalities present.   Inferior wall hypokinesis There is low normal systolic function with a   visually estimated ejection fraction of 50 - 55%. Ejection fraction by   visual approximation is 50%.    Right Ventricle: Mild right ventricular enlargement.    Aortic Valve: The aortic valve is a trileaflet valve. There is mild   aortic valve sclerosis. Mildly calcified cusps.    Mitral Valve: There is mild bileaflet sclerosis. Mildly thickened   leaflets. There is severe regurgitation with a centrally directed jet.    IVC/SVC: Normal venous pressure at 3 mmHg.    Pericardium: There is a trivial effusion.       Cardiology followed.  Diuresed initially but held due to DENYS.  Continue carvedilol, resume Lasix once Cr improved.

## 2024-06-01 NOTE — PROGRESS NOTES
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      History was obtained from records as patient is a poor historian at baseline.  Patient presented from his nursing home with acute respiratory distress.  There was concern for an aspiration event.  On presentation to the ED he was noted to be hypoxic with SpO2 93%   Associated with increased work of breathing.  A chest x-ray demonstrated hazy opacification of bilateral lungs. He was recently admitted 04/30-05/03 with hypernatremia 2/2 hypovolemia which was attributed to his decreased PO intake.   ED course with patient receiving vancomycin IV x1, Zosyn IV x1 and NS IV x1L. On my assessment, recommended diuresis with Lasix IV, NIV settings were changed and started him on a nitro glycerin drip.    Overview/Hospital Course:  Admitted to ICU on 5/27 for acute respiratory failure requiring BIPAP sec to acute pulmonary edema from hypertensive emergency. Found to have elevated troponin from HTN emergency and possibly with acute diastolic heart failure requiring Lasix infusion. Echo showed severe MR. Cardiology followed. Course complicated with Afib RVR requiring adjustment of home beta blocker dose. Patient developed DENYS on CKD stage 3 likely sec to diuresis. Patient was eventually weaned off BIPAP to NC, Lasix  was held due to DENYS. He was transferred to floor on 5/30 and hospitalist assumed care on 5/31.     5/31- BP controlled, developed atrial fibrillation with RVR again requiring IV Lopressor 5mg x one dose. Na trending up.   6/1- Gentle hydration started, metoprolol dose increased for HR control.     Interval History: Patient seen and examined at the bedside, not much verbal, answers yes and no, follow some commands.     Review of Systems   Unable to perform ROS: Dementia     Objective:     Vital Signs (Most Recent):  Temp: 97.7 °F (36.5 °C) (06/01/24 0814)  Pulse: 98 (06/01/24 0814)  Resp: 18 (06/01/24 0814)  BP: 128/77 (06/01/24 0814)  SpO2: 96 % (06/01/24 0814) Vital Signs (24h Range):  Temp:  [97.4 °F (36.3 °C)-98.2 °F (36.8 °C)] 97.7 °F (36.5 °C)  Pulse:  [] 98  Resp:  [16-20] 18  SpO2:  [95 %-99 %] 96 %  BP: ()/(59-83) 128/77     Weight: 72.3 kg (159 lb 6.3 oz)  Body mass index is 21.62 kg/m².    Intake/Output Summary (Last 24 hours) at 6/1/2024 1006  Last data filed at 6/1/2024 0745  Gross per 24 hour   Intake 584 ml   Output 200 ml   Net 384 ml         Physical Exam  Constitutional:       Appearance: He is ill-appearing.   Cardiovascular:      Rate and Rhythm: Tachycardia present. Rhythm irregular.      Heart sounds: Murmur heard.   Pulmonary:      Effort: Pulmonary effort is normal.      Breath sounds: Wheezing present.   Abdominal:      General: Bowel sounds are normal.      Palpations: Abdomen is soft.   Skin:     General: Skin is warm.   Neurological:      General: No focal deficit present.      Mental Status: He is easily aroused. Mental status is at baseline. He is disoriented.             Significant Labs: All pertinent labs within the past 24 hours have been reviewed.    Significant Imaging: I have reviewed all pertinent imaging results/findings within the past 24 hours.    Assessment/Plan:      * Acute respiratory failure with hypoxia  Sec to flash pulmonary edema from HTN  emergency.  Required BIPAP on admission and ICU stay, transferred to floor after weaning to NC 2L.  Stable resp status now.     Hypertensive emergency  Patient has a current diagnosis of Hypertensive emergency with end organ damage evidenced by acute kidney injury and acute heart failure which is controlled.  Latest blood pressure and vitals reviewed-   Temp:  [97.4 °F (36.3 °C)-98.2 °F (36.8 °C)]   Pulse:  []   Resp:  [16-20]   BP: ()/(59-83)   SpO2:  [95 %-99 %] .   Patient currently off IV antihypertensives.   Home meds for hypertension were reviewed.  Medication adjustment for hospital antihypertensives is as follows- resume current medications (started during this hospitalization): hydralazine, Imdur and carvedilol.         Atrial fibrillation with rapid ventricular response  Patient with Persistent (7 days or more) atrial fibrillation which is uncontrolled currently with Beta Blocker. Patient is currently in atrial fibrillation.[id number] Score: 2. HASBLED Score: 2. Anticoagulation indicated. Anticoagulation done with Eliquis .    Required IV Lopressor 5mg x 2 doses on 5/31 for better HR control.  Changed home carvedilol to metoprolol and dose increased for better HR control.    Cardiology signed off.     Acute on chronic diastolic heart failure  Echo    Result Date: 5/28/2024    Left Ventricle: The left ventricle is normal in size. Moderately   increased wall thickness. Regional wall motion abnormalities present.   Inferior wall hypokinesis There is low normal systolic function with a   visually estimated ejection fraction of 50 - 55%. Ejection fraction by   visual approximation is 50%.    Right Ventricle: Mild right ventricular enlargement.    Aortic Valve: The aortic valve is a trileaflet valve. There is mild   aortic valve sclerosis. Mildly calcified cusps.    Mitral Valve: There is mild bileaflet sclerosis. Mildly thickened   leaflets. There is severe regurgitation with a centrally directed  jet.    IVC/SVC: Normal venous pressure at 3 mmHg.    Pericardium: There is a trivial effusion.       Cardiology followed.  Diuresed initially but held due to DENYS.  Continue carvedilol, resume Lasix once Cr improved.       DENYS (acute kidney injury)  Patient with acute kidney injury/acute renal failure likely due to pre-renal azotemia due to IVVD   DENYS is currently stable.   Baseline creatinine  ~ 2  - Labs reviewed- Renal function/electrolytes with Estimated Creatinine Clearance: 23.8 mL/min (A) (based on SCr of 2.49 mg/dL (H)). according to latest data.   Gentle IV hydration.   Monitor urine output and serial BMP and adjust therapy as needed. Avoid nephrotoxins and renally dose meds for GFR listed above.    Type 2 myocardial infarction  Troponin elevated sec to hypertensive emergency.  Cardiology followed, no ischemic workup recommended.         Severe mitral regurgitation  Per echocardiogram- Mitral Valve: There is mild bileaflet sclerosis. Mildly thickened leaflets. There is severe regurgitation with a centrally directed jet.  Cardiology followed, recommend medical management at this time, afterload reduction initiated (Bidil, no ace/arb due to CKD). Continue diuresis when able. Can consider referral to structural cardiology if unable to manage medically and family wishes.         Hypernatremia  Patient has hypernatremia which is controlled. The hypernatremia is due to Dehydration. We will aim to correct the sodium by 8-10mEq in 24 hours. We will correct their hypernatremia with D5W at 50cc/hr x 24 hours (free water deficit ~ 1.5L).   The patient's sodium results have been reviewed and are listed below.  Recent Labs   Lab 06/01/24  0555   *         Seizure disorder  Resume home Keppra.       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 Dementia is severe. The patient does not  have signs of behavioral disturbance. Home dementia medications are Held or Continued: continued.. Continue non-pharmacologic interventions to prevent delirium (No VS between 11PM-5AM, activity during day, opening blinds, providing glasses/hearing aids, and up in chair during daytime). Will avoid narcotics and benzos unless absolutely necessary. PRN anti-psychotics are not prescribed to avoid self harm behaviors.  History of CVA-  resume Eliquis and Plavix (off aspirin to avoid triple therapy), statin.       VTE Risk Mitigation (From admission, onward)           Ordered     apixaban tablet 2.5 mg  2 times daily         05/29/24 1845     IP VTE HIGH RISK PATIENT  Once         05/27/24 1436     Place sequential compression device  Until discontinued         05/27/24 1436                    Discharge Planning   SHAYY: 6/3/2024     Code Status: DNR   Is the patient medically ready for discharge?:     Reason for patient still in hospital (select all that apply): Patient trending condition  Discharge Plan A: Return to nursing home                  FREDO COLLINS MD  Department of Hospital Medicine   [facility]

## 2024-06-01 NOTE — PLAN OF CARE
Problem: Adult Inpatient Plan of Care  Goal: Patient-Specific Goal (Individualized)  Outcome: Progressing     Problem: Adult Inpatient Plan of Care  Goal: Absence of Hospital-Acquired Illness or Injury  Outcome: Progressing     Problem: Adult Inpatient Plan of Care  Goal: Optimal Comfort and Wellbeing  Outcome: Progressing

## 2024-06-02 LAB
ALBUMIN SERPL BCP-MCNC: 2.4 G/DL (ref 3.5–5)
ALBUMIN/GLOB SERPL: 0.6 {RATIO}
ALP SERPL-CCNC: 77 U/L (ref 45–115)
ALT SERPL W P-5'-P-CCNC: 40 U/L (ref 16–61)
ANION GAP SERPL CALCULATED.3IONS-SCNC: 9 MMOL/L (ref 7–16)
AST SERPL W P-5'-P-CCNC: 37 U/L (ref 15–37)
BACTERIA BLD CULT: NORMAL
BACTERIA BLD CULT: NORMAL
BILIRUB SERPL-MCNC: 0.4 MG/DL (ref ?–1.2)
BUN SERPL-MCNC: 92 MG/DL (ref 7–18)
BUN/CREAT SERPL: 37 (ref 6–20)
CALCIUM SERPL-MCNC: 8.7 MG/DL (ref 8.5–10.1)
CHLORIDE SERPL-SCNC: 125 MMOL/L (ref 98–107)
CO2 SERPL-SCNC: 17 MMOL/L (ref 21–32)
CREAT SERPL-MCNC: 2.49 MG/DL (ref 0.7–1.3)
EGFR (NO RACE VARIABLE) (RUSH/TITUS): 25 ML/MIN/1.73M2
GLOBULIN SER-MCNC: 3.8 G/DL (ref 2–4)
GLUCOSE SERPL-MCNC: 102 MG/DL (ref 70–105)
GLUCOSE SERPL-MCNC: 111 MG/DL (ref 70–105)
GLUCOSE SERPL-MCNC: 121 MG/DL (ref 74–106)
GLUCOSE SERPL-MCNC: 122 MG/DL (ref 70–105)
GLUCOSE SERPL-MCNC: 124 MG/DL (ref 70–105)
GLUCOSE SERPL-MCNC: 137 MG/DL (ref 70–105)
OHS QRS DURATION: 82 MS
OHS QTC CALCULATION: 470 MS
POTASSIUM SERPL-SCNC: 5.1 MMOL/L (ref 3.5–5.1)
PROT SERPL-MCNC: 6.2 G/DL (ref 6.4–8.2)
SODIUM SERPL-SCNC: 146 MMOL/L (ref 136–145)

## 2024-06-02 PROCEDURE — 63600175 PHARM REV CODE 636 W HCPCS: Performed by: INTERNAL MEDICINE

## 2024-06-02 PROCEDURE — 94640 AIRWAY INHALATION TREATMENT: CPT

## 2024-06-02 PROCEDURE — 99233 SBSQ HOSP IP/OBS HIGH 50: CPT | Mod: ,,, | Performed by: INTERNAL MEDICINE

## 2024-06-02 PROCEDURE — 25000242 PHARM REV CODE 250 ALT 637 W/ HCPCS: Performed by: INTERNAL MEDICINE

## 2024-06-02 PROCEDURE — 27000221 HC OXYGEN, UP TO 24 HOURS

## 2024-06-02 PROCEDURE — 94761 N-INVAS EAR/PLS OXIMETRY MLT: CPT

## 2024-06-02 PROCEDURE — 80053 COMPREHEN METABOLIC PANEL: CPT | Performed by: INTERNAL MEDICINE

## 2024-06-02 PROCEDURE — 25000003 PHARM REV CODE 250: Performed by: NURSE PRACTITIONER

## 2024-06-02 PROCEDURE — 11000001 HC ACUTE MED/SURG PRIVATE ROOM

## 2024-06-02 PROCEDURE — 82962 GLUCOSE BLOOD TEST: CPT

## 2024-06-02 PROCEDURE — 63600175 PHARM REV CODE 636 W HCPCS: Performed by: STUDENT IN AN ORGANIZED HEALTH CARE EDUCATION/TRAINING PROGRAM

## 2024-06-02 PROCEDURE — 36415 COLL VENOUS BLD VENIPUNCTURE: CPT | Performed by: INTERNAL MEDICINE

## 2024-06-02 PROCEDURE — 25000003 PHARM REV CODE 250: Performed by: INTERNAL MEDICINE

## 2024-06-02 PROCEDURE — 25000003 PHARM REV CODE 250: Performed by: STUDENT IN AN ORGANIZED HEALTH CARE EDUCATION/TRAINING PROGRAM

## 2024-06-02 PROCEDURE — 25000242 PHARM REV CODE 250 ALT 637 W/ HCPCS: Performed by: STUDENT IN AN ORGANIZED HEALTH CARE EDUCATION/TRAINING PROGRAM

## 2024-06-02 PROCEDURE — 99900035 HC TECH TIME PER 15 MIN (STAT)

## 2024-06-02 RX ORDER — FUROSEMIDE 10 MG/ML
20 INJECTION INTRAMUSCULAR; INTRAVENOUS ONCE
Status: COMPLETED | OUTPATIENT
Start: 2024-06-02 | End: 2024-06-02

## 2024-06-02 RX ADMIN — PREDNISONE 50 MG: 10 TABLET ORAL at 08:06

## 2024-06-02 RX ADMIN — ACETAMINOPHEN 650 MG: 325 TABLET ORAL at 08:06

## 2024-06-02 RX ADMIN — MIRTAZAPINE 15 MG: 15 TABLET, FILM COATED ORAL at 10:06

## 2024-06-02 RX ADMIN — IPRATROPIUM BROMIDE AND ALBUTEROL SULFATE 3 ML: 2.5; .5 SOLUTION RESPIRATORY (INHALATION) at 07:06

## 2024-06-02 RX ADMIN — METOPROLOL TARTRATE 50 MG: 50 TABLET, FILM COATED ORAL at 05:06

## 2024-06-02 RX ADMIN — IPRATROPIUM BROMIDE AND ALBUTEROL SULFATE 3 ML: 2.5; .5 SOLUTION RESPIRATORY (INHALATION) at 12:06

## 2024-06-02 RX ADMIN — PRAVASTATIN SODIUM 80 MG: 40 TABLET ORAL at 09:06

## 2024-06-02 RX ADMIN — APIXABAN 2.5 MG: 2.5 TABLET, FILM COATED ORAL at 08:06

## 2024-06-02 RX ADMIN — CLOPIDOGREL BISULFATE 75 MG: 75 TABLET ORAL at 08:06

## 2024-06-02 RX ADMIN — LEVETIRACETAM 500 MG: 500 SOLUTION ORAL at 10:06

## 2024-06-02 RX ADMIN — PANTOPRAZOLE SODIUM 40 MG: 40 GRANULE, DELAYED RELEASE ORAL at 08:06

## 2024-06-02 RX ADMIN — METOPROLOL TARTRATE 50 MG: 50 TABLET, FILM COATED ORAL at 12:06

## 2024-06-02 RX ADMIN — IPRATROPIUM BROMIDE AND ALBUTEROL SULFATE 3 ML: .5; 3 SOLUTION RESPIRATORY (INHALATION) at 08:06

## 2024-06-02 RX ADMIN — APIXABAN 2.5 MG: 2.5 TABLET, FILM COATED ORAL at 09:06

## 2024-06-02 RX ADMIN — LEVETIRACETAM 500 MG: 500 SOLUTION ORAL at 08:06

## 2024-06-02 RX ADMIN — IPRATROPIUM BROMIDE AND ALBUTEROL SULFATE 3 ML: 2.5; .5 SOLUTION RESPIRATORY (INHALATION) at 11:06

## 2024-06-02 RX ADMIN — FUROSEMIDE 20 MG: 10 INJECTION, SOLUTION INTRAMUSCULAR; INTRAVENOUS at 12:06

## 2024-06-02 RX ADMIN — HYDRALAZINE HYDROCHLORIDE: 10 TABLET, FILM COATED ORAL at 08:06

## 2024-06-02 RX ADMIN — IPRATROPIUM BROMIDE AND ALBUTEROL SULFATE 3 ML: 2.5; .5 SOLUTION RESPIRATORY (INHALATION) at 02:06

## 2024-06-02 RX ADMIN — HYDRALAZINE HYDROCHLORIDE: 10 TABLET, FILM COATED ORAL at 03:06

## 2024-06-02 RX ADMIN — HYDRALAZINE HYDROCHLORIDE: 10 TABLET, FILM COATED ORAL at 09:06

## 2024-06-02 NOTE — ASSESSMENT & PLAN NOTE
Patient with acute kidney injury/acute renal failure likely due to pre-renal azotemia due to IVVD   DENYS is currently stable.   Baseline creatinine  ~ 2  - Labs reviewed- Renal function/electrolytes with Estimated Creatinine Clearance: 23.8 mL/min (A) (based on SCr of 2.49 mg/dL (H)). according to latest data.   Received some hydration and now stopped for concerns of volume overload.  Monitor with Lasix dosing,.    Monitor urine output and serial BMP and adjust therapy as needed. Avoid nephrotoxins and renally dose meds for GFR listed above.

## 2024-06-02 NOTE — ASSESSMENT & PLAN NOTE
Patient has a current diagnosis of Hypertensive emergency with end organ damage evidenced by acute kidney injury and acute heart failure which is controlled.  Latest blood pressure and vitals reviewed-   Temp:  [97.2 °F (36.2 °C)-98.2 °F (36.8 °C)]   Pulse:  []   Resp:  [16-21]   BP: (106-154)/(67-99)   SpO2:  [94 %-100 %] .   Patient currently off IV antihypertensives.   Home meds for hypertension were reviewed.  Medication adjustment for hospital antihypertensives is as follows- resume current medications (started during this hospitalization): hydralazine, Imdur and carvedilol.   Monitor BP.        What are your primary concerns for today?   Right hip cramping    Any falls within the last 6 months?  Yes    Any ER visits/ hospitalizations within the last year? If so, why?   No       Medication:                        Times taken:    8 12 5 Bedtime        carbidopa-levodopa (SINEMET)  MG per tablet  1 1 1      FLUoxetine (PROzac) 20 MG capsule  1             Melatonin 10 MG Tab    1     propRANolol (INDERAL) 10 MG tablet  1  1      pregabalin (LYRICA) 75 MG capsule  1  1      traZODone (DESYREL) 50 MG tablet     1

## 2024-06-02 NOTE — ASSESSMENT & PLAN NOTE
Echo    Result Date: 5/28/2024    Left Ventricle: The left ventricle is normal in size. Moderately   increased wall thickness. Regional wall motion abnormalities present.   Inferior wall hypokinesis There is low normal systolic function with a   visually estimated ejection fraction of 50 - 55%. Ejection fraction by   visual approximation is 50%.    Right Ventricle: Mild right ventricular enlargement.    Aortic Valve: The aortic valve is a trileaflet valve. There is mild   aortic valve sclerosis. Mildly calcified cusps.    Mitral Valve: There is mild bileaflet sclerosis. Mildly thickened   leaflets. There is severe regurgitation with a centrally directed jet.    IVC/SVC: Normal venous pressure at 3 mmHg.    Pericardium: There is a trivial effusion.           Cardiology followed.  Diuresed initially but held due to DENYS.  Now with intermittent Lasix dosing.   Continue carvedilol

## 2024-06-02 NOTE — PROGRESS NOTES
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      History was obtained from records as patient is a poor historian at baseline.  Patient presented from his nursing home with acute respiratory distress.  There was concern for an aspiration event.  On presentation to the ED he was noted to be hypoxic with SpO2 93%   Associated with increased work of breathing.  A chest x-ray demonstrated hazy opacification of bilateral lungs. He was recently admitted 04/30-05/03 with hypernatremia 2/2 hypovolemia which was attributed to his decreased PO intake.   ED course with patient receiving vancomycin IV x1, Zosyn IV x1 and NS IV x1L. On my assessment, recommended diuresis with Lasix IV, NIV settings were changed and started him on a nitro glycerin drip.    Overview/Hospital Course:  Admitted to ICU on 5/27 for acute respiratory failure requiring BIPAP sec to acute pulmonary edema from hypertensive emergency. Found to have elevated troponin from HTN emergency and possibly with acute diastolic heart failure requiring Lasix infusion. Echo showed severe MR. Cardiology followed. Course complicated with Afib RVR requiring adjustment of home beta blocker dose. Patient developed DENYS on CKD stage 3 likely sec to diuresis. Patient was eventually weaned off BIPAP to NC, Lasix  was held due to DENYS. He was transferred to floor on 5/30 and hospitalist assumed care on 5/31.     5/31- BP controlled, developed atrial fibrillation with RVR again requiring IV Lopressor 5mg x one dose. Na trending up.   6/1- Gentle hydration started, metoprolol dose increased for HR control.   6/2- Off hydration as clinically appeared slightly volume overloaded, one dose of IV Lasix given. CXR and oxygenation stable.     Interval History: Patient seen and examined at the bedside, not much verbal, answers yes and no, follow some commands.     Review of Systems   Unable to perform ROS: Dementia     Objective:     Vital Signs (Most Recent):  Temp: 97.5 °F (36.4 °C) (06/02/24 1158)  Pulse: 66 (06/02/24 1158)  Resp: 18 (06/02/24 1158)  BP: 109/78 (06/02/24 1238)  SpO2: 97 % (06/02/24 1313) Vital Signs (24h Range):  Temp:  [97.2 °F (36.2 °C)-98.2 °F (36.8 °C)] 97.5 °F (36.4 °C)  Pulse:  [] 66  Resp:  [16-21] 18  SpO2:  [94 %-100 %] 97 %  BP: (106-154)/(67-99) 109/78     Weight: 72.3 kg (159 lb 6.3 oz)  Body mass index is 21.62 kg/m².    Intake/Output Summary (Last 24 hours) at 6/2/2024 1323  Last data filed at 6/2/2024 0841  Gross per 24 hour   Intake 362 ml   Output --   Net 362 ml         Physical Exam  Constitutional:       Appearance: He is ill-appearing.   Cardiovascular:      Rate and Rhythm: Tachycardia present. Rhythm irregular.      Heart sounds: Murmur heard.   Pulmonary:      Effort: Pulmonary effort is normal.      Breath sounds: Wheezing present.   Abdominal:      General: Bowel sounds are normal.      Palpations: Abdomen is soft.   Skin:     General: Skin is warm.   Neurological:      General: No focal deficit present.      Mental Status: He is easily aroused. Mental status is at baseline. He is disoriented.             Significant Labs: All pertinent labs within the past 24 hours have been reviewed.    Significant Imaging: I have reviewed all pertinent imaging results/findings within the past 24  hours.    Assessment/Plan:      * Acute respiratory failure with hypoxia  Sec to flash pulmonary edema from HTN emergency.  Required BIPAP on admission and ICU stay, transferred to floor after weaning to NC 2L.  S/p intermittent Lasix dosing.   Stable resp status now.     Hypertensive emergency  Patient has a current diagnosis of Hypertensive emergency with end organ damage evidenced by acute kidney injury and acute heart failure which is controlled.  Latest blood pressure and vitals reviewed-   Temp:  [97.2 °F (36.2 °C)-98.2 °F (36.8 °C)]   Pulse:  []   Resp:  [16-21]   BP: (106-154)/(67-99)   SpO2:  [94 %-100 %] .   Patient currently off IV antihypertensives.   Home meds for hypertension were reviewed.  Medication adjustment for hospital antihypertensives is as follows- resume current medications (started during this hospitalization): hydralazine, Imdur and carvedilol.   Monitor BP.         Atrial fibrillation with rapid ventricular response  Patient with Persistent (7 days or more) atrial fibrillation which is uncontrolled currently with Beta Blocker. Patient is currently in atrial fibrillation.[id number] Score: 2. HASBLED Score: 2. Anticoagulation indicated. Anticoagulation done with Eliquis .    Required IV Lopressor 5mg x 2 doses on 5/31 for better HR control.  Changed home carvedilol to metoprolol and dose increased for better HR control.    Cardiology signed off.     Acute on chronic diastolic heart failure  Echo    Result Date: 5/28/2024    Left Ventricle: The left ventricle is normal in size. Moderately   increased wall thickness. Regional wall motion abnormalities present.   Inferior wall hypokinesis There is low normal systolic function with a   visually estimated ejection fraction of 50 - 55%. Ejection fraction by   visual approximation is 50%.    Right Ventricle: Mild right ventricular enlargement.    Aortic Valve: The aortic valve is a trileaflet valve. There is mild   aortic valve sclerosis.  Mildly calcified cusps.    Mitral Valve: There is mild bileaflet sclerosis. Mildly thickened   leaflets. There is severe regurgitation with a centrally directed jet.    IVC/SVC: Normal venous pressure at 3 mmHg.    Pericardium: There is a trivial effusion.           Cardiology followed.  Diuresed initially but held due to DENYS.  Now with intermittent Lasix dosing.   Continue carvedilol       Type 2 myocardial infarction  Troponin elevated sec to hypertensive emergency.  Cardiology followed, no ischemic workup recommended.         Severe mitral regurgitation  Per echocardiogram- Mitral Valve: There is mild bileaflet sclerosis. Mildly thickened leaflets. There is severe regurgitation with a centrally directed jet.  Cardiology followed, recommend medical management at this time, afterload reduction initiated (Bidil, no ace/arb due to CKD). Continue diuresis as able. Can consider referral to structural cardiology if unable to manage medically and family wishes.         Hypernatremia  Patient has hypernatremia which is controlled. The hypernatremia is due to Dehydration. We will aim to correct the sodium by 8-10mEq in 24 hours. S/p D5W, now off for concerns of volume overload.   The patient's sodium results have been reviewed and are listed below.  Recent Labs   Lab 06/02/24  0506   *         DENYS (acute kidney injury)  Patient with acute kidney injury/acute renal failure likely due to pre-renal azotemia due to IVVD   DENYS is currently stable.   Baseline creatinine  ~ 2  - Labs reviewed- Renal function/electrolytes with Estimated Creatinine Clearance: 23.8 mL/min (A) (based on SCr of 2.49 mg/dL (H)). according to latest data.   Received some hydration and now stopped for concerns of volume overload.  Monitor with Lasix dosing,.    Monitor urine output and serial BMP and adjust therapy as needed. Avoid nephrotoxins and renally dose meds for GFR listed above.    Seizure disorder  Resume home Keppra.       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 Dementia is severe. The patient does not have signs of behavioral disturbance. Home dementia medications are Held or Continued: continued.. Continue non-pharmacologic interventions to prevent delirium (No VS between 11PM-5AM, activity during day, opening blinds, providing glasses/hearing aids, and up in chair during daytime). Will avoid narcotics and benzos unless absolutely necessary. PRN anti-psychotics are not prescribed to avoid self harm behaviors.  History of CVA-  resume Eliquis and Plavix (off aspirin to avoid triple therapy), statin.       VTE Risk Mitigation (From admission, onward)           Ordered     apixaban tablet 2.5 mg  2 times daily         05/29/24 1845     IP VTE HIGH RISK PATIENT  Once         05/27/24 1436     Place sequential compression device  Until discontinued         05/27/24 1436                    Discharge Planning   SHAYY: 6/4/2024     Code Status: DNR   Is the patient medically ready for discharge?:     Reason for patient still in hospital (select all that apply): Patient trending condition  Discharge Plan A: Return to nursing home                  FREDO COLLINS MD  Department of Hospital Medicine   [facility]

## 2024-06-02 NOTE — ASSESSMENT & PLAN NOTE
Patient with Persistent (7 days or more) atrial fibrillation which is uncontrolled currently with Beta Blocker. Patient is currently in atrial fibrillation.[id number] Score: 2. HASBLED Score: 2. Anticoagulation indicated. Anticoagulation done with Eliquis .    Required IV Lopressor 5mg x 2 doses on 5/31 for better HR control.  Changed home carvedilol to metoprolol and dose increased for better HR control.    Cardiology signed off.

## 2024-06-02 NOTE — ASSESSMENT & PLAN NOTE
Sec to flash pulmonary edema from HTN emergency.  Required BIPAP on admission and ICU stay, transferred to floor after weaning to NC 2L.  S/p intermittent Lasix dosing.   Stable resp status now.

## 2024-06-02 NOTE — SUBJECTIVE & OBJECTIVE
Interval History: Patient seen and examined at the bedside, not much verbal, answers yes and no, follow some commands.     Review of Systems   Unable to perform ROS: Dementia     Objective:     Vital Signs (Most Recent):  Temp: 97.5 °F (36.4 °C) (06/02/24 1158)  Pulse: 66 (06/02/24 1158)  Resp: 18 (06/02/24 1158)  BP: 109/78 (06/02/24 1238)  SpO2: 97 % (06/02/24 1313) Vital Signs (24h Range):  Temp:  [97.2 °F (36.2 °C)-98.2 °F (36.8 °C)] 97.5 °F (36.4 °C)  Pulse:  [] 66  Resp:  [16-21] 18  SpO2:  [94 %-100 %] 97 %  BP: (106-154)/(67-99) 109/78     Weight: 72.3 kg (159 lb 6.3 oz)  Body mass index is 21.62 kg/m².    Intake/Output Summary (Last 24 hours) at 6/2/2024 1323  Last data filed at 6/2/2024 0841  Gross per 24 hour   Intake 362 ml   Output --   Net 362 ml         Physical Exam  Constitutional:       Appearance: He is ill-appearing.   Cardiovascular:      Rate and Rhythm: Tachycardia present. Rhythm irregular.      Heart sounds: Murmur heard.   Pulmonary:      Effort: Pulmonary effort is normal.      Breath sounds: Wheezing present.   Abdominal:      General: Bowel sounds are normal.      Palpations: Abdomen is soft.   Skin:     General: Skin is warm.   Neurological:      General: No focal deficit present.      Mental Status: He is easily aroused. Mental status is at baseline. He is disoriented.             Significant Labs: All pertinent labs within the past 24 hours have been reviewed.    Significant Imaging: I have reviewed all pertinent imaging results/findings within the past 24 hours.

## 2024-06-02 NOTE — PLAN OF CARE
Problem: Acute Kidney Injury/Impairment  Goal: Effective Renal Function  Outcome: Progressing     Problem: Acute Kidney Injury/Impairment  Goal: Fluid and Electrolyte Balance  Outcome: Progressing     Problem: Adult Inpatient Plan of Care  Goal: Readiness for Transition of Care  Outcome: Progressing

## 2024-06-02 NOTE — ASSESSMENT & PLAN NOTE
Per echocardiogram- Mitral Valve: There is mild bileaflet sclerosis. Mildly thickened leaflets. There is severe regurgitation with a centrally directed jet.  Cardiology followed, recommend medical management at this time, afterload reduction initiated (Bidil, no ace/arb due to CKD). Continue diuresis as able. Can consider referral to structural cardiology if unable to manage medically and family wishes.

## 2024-06-02 NOTE — ASSESSMENT & PLAN NOTE
Patient has hypernatremia which is controlled. The hypernatremia is due to Dehydration. We will aim to correct the sodium by 8-10mEq in 24 hours. S/p D5W, now off for concerns of volume overload.   The patient's sodium results have been reviewed and are listed below.  Recent Labs   Lab 06/02/24  0506   *

## 2024-06-03 PROBLEM — R13.12 OROPHARYNGEAL DYSPHAGIA: Status: ACTIVE | Noted: 2024-06-03

## 2024-06-03 LAB
ALBUMIN SERPL BCP-MCNC: 2.5 G/DL (ref 3.5–5)
ALBUMIN/GLOB SERPL: 0.7 {RATIO}
ALP SERPL-CCNC: 83 U/L (ref 45–115)
ALT SERPL W P-5'-P-CCNC: 49 U/L (ref 16–61)
ANION GAP SERPL CALCULATED.3IONS-SCNC: 11 MMOL/L (ref 7–16)
AST SERPL W P-5'-P-CCNC: 41 U/L (ref 15–37)
BASOPHILS # BLD AUTO: 0.01 K/UL (ref 0–0.2)
BASOPHILS NFR BLD AUTO: 0.1 % (ref 0–1)
BILIRUB SERPL-MCNC: 0.4 MG/DL (ref ?–1.2)
BUN SERPL-MCNC: 103 MG/DL (ref 7–18)
BUN/CREAT SERPL: 41 (ref 6–20)
CALCIUM SERPL-MCNC: 8.8 MG/DL (ref 8.5–10.1)
CHLORIDE SERPL-SCNC: 124 MMOL/L (ref 98–107)
CO2 SERPL-SCNC: 18 MMOL/L (ref 21–32)
CREAT SERPL-MCNC: 2.51 MG/DL (ref 0.7–1.3)
DIFFERENTIAL METHOD BLD: ABNORMAL
EGFR (NO RACE VARIABLE) (RUSH/TITUS): 25 ML/MIN/1.73M2
EOSINOPHIL # BLD AUTO: 0 K/UL (ref 0–0.5)
EOSINOPHIL NFR BLD AUTO: 0 % (ref 1–4)
ERYTHROCYTE [DISTWIDTH] IN BLOOD BY AUTOMATED COUNT: 17.9 % (ref 11.5–14.5)
GLOBULIN SER-MCNC: 3.5 G/DL (ref 2–4)
GLUCOSE SERPL-MCNC: 105 MG/DL (ref 70–105)
GLUCOSE SERPL-MCNC: 105 MG/DL (ref 70–105)
GLUCOSE SERPL-MCNC: 111 MG/DL (ref 74–106)
GLUCOSE SERPL-MCNC: 114 MG/DL (ref 70–105)
GLUCOSE SERPL-MCNC: 147 MG/DL (ref 70–105)
GLUCOSE SERPL-MCNC: 84 MG/DL (ref 70–105)
HCT VFR BLD AUTO: 36.7 % (ref 40–54)
HGB BLD-MCNC: 10.9 G/DL (ref 13.5–18)
IMM GRANULOCYTES # BLD AUTO: 0.03 K/UL (ref 0–0.04)
IMM GRANULOCYTES NFR BLD: 0.3 % (ref 0–0.4)
LYMPHOCYTES # BLD AUTO: 0.71 K/UL (ref 1–4.8)
LYMPHOCYTES NFR BLD AUTO: 7.5 % (ref 27–41)
MCH RBC QN AUTO: 27.7 PG (ref 27–31)
MCHC RBC AUTO-ENTMCNC: 29.7 G/DL (ref 32–36)
MCV RBC AUTO: 93.4 FL (ref 80–96)
MONOCYTES # BLD AUTO: 0.44 K/UL (ref 0–0.8)
MONOCYTES NFR BLD AUTO: 4.7 % (ref 2–6)
MPC BLD CALC-MCNC: 13.2 FL (ref 9.4–12.4)
NEUTROPHILS # BLD AUTO: 8.25 K/UL (ref 1.8–7.7)
NEUTROPHILS NFR BLD AUTO: 87.4 % (ref 53–65)
NRBC # BLD AUTO: 0 X10E3/UL
NRBC, AUTO (.00): 0 %
PLATELET # BLD AUTO: 124 K/UL (ref 150–400)
POTASSIUM SERPL-SCNC: 5.1 MMOL/L (ref 3.5–5.1)
PROT SERPL-MCNC: 6 G/DL (ref 6.4–8.2)
RBC # BLD AUTO: 3.93 M/UL (ref 4.6–6.2)
SODIUM SERPL-SCNC: 148 MMOL/L (ref 136–145)
WBC # BLD AUTO: 9.44 K/UL (ref 4.5–11)

## 2024-06-03 PROCEDURE — 11000001 HC ACUTE MED/SURG PRIVATE ROOM

## 2024-06-03 PROCEDURE — 36415 COLL VENOUS BLD VENIPUNCTURE: CPT | Performed by: INTERNAL MEDICINE

## 2024-06-03 PROCEDURE — 25000003 PHARM REV CODE 250: Performed by: INTERNAL MEDICINE

## 2024-06-03 PROCEDURE — 85025 COMPLETE CBC W/AUTO DIFF WBC: CPT | Performed by: INTERNAL MEDICINE

## 2024-06-03 PROCEDURE — 80053 COMPREHEN METABOLIC PANEL: CPT | Performed by: INTERNAL MEDICINE

## 2024-06-03 PROCEDURE — 25000003 PHARM REV CODE 250: Performed by: STUDENT IN AN ORGANIZED HEALTH CARE EDUCATION/TRAINING PROGRAM

## 2024-06-03 PROCEDURE — 99900035 HC TECH TIME PER 15 MIN (STAT)

## 2024-06-03 PROCEDURE — 96372 THER/PROPH/DIAG INJ SC/IM: CPT

## 2024-06-03 PROCEDURE — 82962 GLUCOSE BLOOD TEST: CPT

## 2024-06-03 PROCEDURE — 99233 SBSQ HOSP IP/OBS HIGH 50: CPT | Mod: ,,, | Performed by: INTERNAL MEDICINE

## 2024-06-03 PROCEDURE — 92610 EVALUATE SWALLOWING FUNCTION: CPT

## 2024-06-03 PROCEDURE — 25000003 PHARM REV CODE 250: Performed by: NURSE PRACTITIONER

## 2024-06-03 PROCEDURE — 25000242 PHARM REV CODE 250 ALT 637 W/ HCPCS: Performed by: STUDENT IN AN ORGANIZED HEALTH CARE EDUCATION/TRAINING PROGRAM

## 2024-06-03 PROCEDURE — 94640 AIRWAY INHALATION TREATMENT: CPT

## 2024-06-03 RX ADMIN — APIXABAN 2.5 MG: 2.5 TABLET, FILM COATED ORAL at 09:06

## 2024-06-03 RX ADMIN — CLOPIDOGREL BISULFATE 75 MG: 75 TABLET ORAL at 08:06

## 2024-06-03 RX ADMIN — APIXABAN 2.5 MG: 2.5 TABLET, FILM COATED ORAL at 08:06

## 2024-06-03 RX ADMIN — IPRATROPIUM BROMIDE AND ALBUTEROL SULFATE 3 ML: 2.5; .5 SOLUTION RESPIRATORY (INHALATION) at 02:06

## 2024-06-03 RX ADMIN — MIRTAZAPINE 15 MG: 15 TABLET, FILM COATED ORAL at 09:06

## 2024-06-03 RX ADMIN — METOPROLOL TARTRATE 50 MG: 50 TABLET, FILM COATED ORAL at 06:06

## 2024-06-03 RX ADMIN — LEVETIRACETAM 500 MG: 500 SOLUTION ORAL at 08:06

## 2024-06-03 RX ADMIN — PRAVASTATIN SODIUM 80 MG: 40 TABLET ORAL at 09:06

## 2024-06-03 RX ADMIN — LEVETIRACETAM 500 MG: 500 SOLUTION ORAL at 09:06

## 2024-06-03 RX ADMIN — METOPROLOL TARTRATE 50 MG: 50 TABLET, FILM COATED ORAL at 12:06

## 2024-06-03 RX ADMIN — PANTOPRAZOLE SODIUM 40 MG: 40 GRANULE, DELAYED RELEASE ORAL at 08:06

## 2024-06-03 RX ADMIN — IPRATROPIUM BROMIDE AND ALBUTEROL SULFATE 3 ML: 2.5; .5 SOLUTION RESPIRATORY (INHALATION) at 07:06

## 2024-06-03 RX ADMIN — HYDRALAZINE HYDROCHLORIDE: 10 TABLET, FILM COATED ORAL at 09:06

## 2024-06-03 RX ADMIN — METOPROLOL TARTRATE 50 MG: 50 TABLET, FILM COATED ORAL at 05:06

## 2024-06-03 NOTE — ASSESSMENT & PLAN NOTE
Patient has a current diagnosis of Hypertensive emergency with end organ damage evidenced by acute kidney injury and acute heart failure which is controlled.  Latest blood pressure and vitals reviewed-   Temp:  [97.5 °F (36.4 °C)-99 °F (37.2 °C)]   Pulse:  [63-85]   Resp:  [16-20]   BP: (109-143)/()   SpO2:  [90 %-100 %] .   Patient currently off IV antihypertensives.   Home meds for hypertension were reviewed.  Medication adjustment for hospital antihypertensives is as follows- resume current medications (started during this hospitalization): hydralazine, Imdur and carvedilol.   Monitor BP.

## 2024-06-03 NOTE — ASSESSMENT & PLAN NOTE
Patient has hypernatremia which is controlled. The hypernatremia is due to Dehydration. We will aim to correct the sodium by 8-10mEq in 24 hours. S/p D5W, now off for concerns of volume overload.   The patient's sodium results have been reviewed and are listed below.  Recent Labs   Lab 06/03/24  0520   *

## 2024-06-03 NOTE — CONSULTS
[facility]  Adult Nutrition  Consult Note         Reason for Assessment  Reason For Assessment: consult (initiate tube feeds)   Nutrition Risk Screen: difficulty chewing/swallowing    Assessment and Plan    6/3/2024: Consult received and appreciated. Consult to initiate tube feedings. Patient had repeat SLP evaluation this afternoon and was found to have a significant swallow delay, putting him at risk for aspiration.     Recommend start Suplena with a goal rate of 45mL/hour with 30mL/hour free water flush. Keep HOB at 30-45 degrees to reduce risk of aspiration. Start rate at 20mL/hour and advance by 10mL q8h until goal rate is reached. Monitor for tolerance: n/v/d/c. Hold feeding and notify RD if symptoms of intolerance develop.     Last BM 5/30. Recommend consider bowel regimen as constipation can affect tube feeding tolerance. RD following.     5/28/2024: Patient is an 83y male admitted 5/27 for hypertensive emergency and aspiration pneumonia. He was identified at risk by screening criteria with MST2.     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 Chart review reveals very little recent weight history. Patient is noted to have moderate fat and muscle depletion to temple, orbital, buccal, and clavicle regions.     Per ASPEN guidelines, patient meets criteria for moderate protein-calorie malnutrition secondary to chronic illness (hx CVA) as evidenced by moderate fat and muscle depletion to temple, orbital, buccal, and clavicle regions.     Discussed patient with nursing this AM during IDT rounds. Patient is currently NPO after failed bedside swallow eval performed by nursing. SLP evaluation has been ordered.     Recommend consider alternate route of nutrition if unable to advance PO diet x 2-3 days.    Last BM 5/27 per flowsheet.    Medications/labs reviewed. RD following.          Learning Needs/Social Determinants of Health  Learning Assessment       05/27/2024 1945 [facility] (5/27/2024 - Present)   Created by Terry Ferrer, RN - RN (Nurse) Status: Complete                 PRIMARY LEARNER     Primary Learner Name:  claude patrick RB - 05/27/2024 1945    Relationship:  Patient RB - 05/27/2024 1945    Does the primary learner have any barriers to learning?:  No Barriers RB - 05/27/2024 1945    What is the preferred language of the primary learner?:  English RB - 05/27/2024 1945    Is an  required?:  No RB - 05/27/2024 1945    How does the primary learner prefer to learn new concepts?:  Listening RB - 05/27/2024 1945    How often do you need to have someone help you read instructions, pamphlets, or written material from your doctor or pharmacy?:  Never RB - 05/27/2024 1945        CO-LEARNER #1     No question answered        CO-LEARNER #2     No question answered        SPECIAL TOPICS     No question answered        ANSWERED BY:     No question answered        Comments         Edit History       Terry Ferrer, RN - RN (Nurse)   05/27/2024 1945                           Social Determinants of Health     Tobacco Use: Low Risk  (10/3/2022)    Received from [facility]    Patient History     Smoking Tobacco Use: Never     Smokeless Tobacco Use: Never     Passive Exposure: Not on file   Alcohol Use: Not At Risk (5/28/2024)    AUDIT-C     Frequency of Alcohol Consumption: Never     Average Number of Drinks: Patient does not drink     Frequency of Binge Drinking: Never   Financial Resource Strain: Patient Unable To Answer (5/28/2024)    Overall Financial Resource Strain (CARDIA)     Difficulty of Paying Living Expenses: Patient unable to answer   Food Insecurity: Patient Unable To Answer (5/28/2024)    Hunger Vital Sign     Worried About Running Out of Food in the Last Year: Patient unable to answer     Ran Out of Food in the Last Year: Patient unable to answer   Transportation Needs: No Transportation Needs (5/28/2024)    TRANSPORTATION  NEEDS     Transportation : No   Physical Activity: Inactive (5/28/2024)    Exercise Vital Sign     Days of Exercise per Week: 0 days     Minutes of Exercise per Session: 0 min   Stress: Patient Unable To Answer (5/28/2024)    [employer] - Occupational Stress Questionnaire     Feeling of Stress : Patient unable to answer   Housing Stability: Patient Unable To Answer (5/28/2024)    Housing Stability Vital Sign     Unable to Pay for Housing in the Last Year: Patient unable to answer     Homeless in the Last Year: Patient unable to answer   Depression: Not on file   Utilities: Patient Unable To Answer (5/28/2024)    [facility] Utilities     Threatened with loss of utilities: Patient unable to answer   Health Literacy: Patient Unable To Answer (5/28/2024)     Health Literacy     Frequency of need for help with medical instructions: Patient unable to respond   Social Isolation: Patient Unable To Answer (5/28/2024)    Social Isolation     Social Isolation: 6            Malnutrition  Is Patient Malnourished: Yes Malnutrition Assessment  Malnutrition Context: chronic illness  Malnutrition Level: moderate          Energy Intake (Malnutrition): less than or equal to 50% for greater than or equal to 1 month  Subcutaneous Fat (Malnutrition): moderate depletion  Muscle Mass (Malnutrition): moderate depletion   Orbital Region (Subcutaneous Fat Loss): moderate depletion   [place] Region (Muscle Loss): moderate depletion  Clavicle Bone Region (Muscle Loss): moderate depletion                 Nutrition Diagnosis  Malnutrition (Moderate) related to Decreased ability to consume sufficient energy as evidenced by hx CVA, failed beside swallow evaluation, moderate fat and muscle depletion to orbital, buccal, temple, and clavicle regions  Comments: consider alternate route of nutrition    Recent Labs   Lab 06/03/24  0520 06/03/24  0523 06/03/24  1227   *  --   --    POCGLU  --    < > 105    < > = values in  this interval not displayed.         Nutrition Prescription / Recommendations  Recommendation/Intervention: Recommend start Suplena with a goal rate of 45mL/hour with 30mL/hour free water flush. Keep HOB at 30-45 degrees to reduce risk of aspiration. Start rate at 20mL/hour and advance by 10mL q8h until goal rate is reached. Monitor for tolerance: n/v/d/c. hold feeding and notify RD if symptoms of intolerance develop.  Goals: Tube feeding tolerance at goal, weight maintenance during admission  Nutrition Goal Status: new  Current Diet Order: NPO  Nutrition Order Comments: SLP recommendations  Chewing or Swallowing Difficulty?: Swallowing difficulty  Recommended Diet: Enteral Nutrition  Recommended Oral Supplement: No Oral Supplements  Is Nutrition Support Recommended:   Needs Calculated    Energy Calorie Requirements (kcal): 1880-2256kcal (25-30kcal/kg)  Protein Requirements: 43-58g (0.6-0.8g/kg)  Enteral Nutrition   Enteral Nutrition Formula Provides:  1938 kcals Propofol Rate: No  49 g Protein  212 g Carbohydrates  104 g Fat Propofol Rate: No  797 ml Fluid without Flush    720 ml Fluid by flush   1517 ml Total Fluid  Enteral Nutrition Recommended Order:  Tube feeding via NG/ [place] Sump  Tube feeding formula: Suplena 1.8 NG/ [place] Sump at 45mL/hour  Free Water Flush: 30 ml hourly  Modular Supplements:No Modular Supplements needed  Enteral Nutrition meets needs?: yes  Enteral Nutrition Status: New Order  Is Nutrition Education Recommended: No    Monitor and Evaluation  % current Intake: NPO  % intake to meet estimated needs: Enteral Nutrition   Food and Nutrient Intake: enteral nutrition intake  Food and Nutrient Adminstration: enteral and parenteral nutrition administration  Anthropometric Measurements: weight, weight change  Biochemical Data, Medical Tests and Procedures: electrolyte and renal panel, gastrointestinal profile, glucose/endocrine profile, inflammatory profile, lipid profile       Current Medical  Diagnosis and Past Medical History     Past Medical History:   Diagnosis Date    Depression     Hemiplegia and hemiparesis following cerebral infarction affecting right dominant side     Hypertension     Seizures     Stroke     Vascular dementia        Nutrition/Diet History  Spiritual, Cultural Beliefs, Jainism Practices, Values that Affect Care: no  Food Allergies: NKFA  Factors Affecting Nutritional Intake: difficulty/impaired swallowing    Lab/Procedures/Meds  Recent Labs   Lab 06/03/24  0520   *   K 5.1   *   CREATININE 2.51*   CALCIUM 8.8   ALBUMIN 2.5*   *   ALT 49   AST 41*   Note: Na+, Cl- elevated. BUN/Cr elevated. PMH DENYS, Ca++ , Alb low    Last A1c:   Lab Results   Component Value Date    HGBA1C 5.7 07/14/2020     Lab Results   Component Value Date    RBC 3.93 (L) 06/03/2024    HGB 10.9 (L) 06/03/2024    HCT 36.7 (L) 06/03/2024    MCV 93.4 06/03/2024    MCH 27.7 06/03/2024    MCHC 29.7 (L) 06/03/2024   Note: H&H low    Pertinent Labs Reviewed: reviewed  Pertinent Medications Reviewed: reviewed  Scheduled Meds:   albuterol-ipratropium  3 mL Nebulization Q8H    apixaban  2.5 mg Oral BID    clopidogreL  75 mg Oral Daily    hydrALAZINE 10 mg 2 tablets, hydrALAZINE 25 mg 2 tablets, isorsorbide dinitrate 20 mg  2 tablets combination   Oral TID    levetiracetam  500 mg Oral BID    metoprolol tartrate  50 mg Oral Q6H    mirtazapine  15 mg Oral QHS    pantoprazole  40 mg Oral Daily    pravastatin  80 mg Oral QHS     Continuous Infusions:      PRN Meds:.  Current Facility-Administered Medications:     acetaminophen, 650 mg, Oral, Q6H PRN    albuterol-ipratropium, 3 mL, Nebulization, Q4H PRN    dextrose 10%, 12.5 g, Intravenous, PRN    dextrose 10%, 25 g, Intravenous, PRN    glucagon (human recombinant), 1 mg, Intramuscular, PRN    metoprolol, 5 mg, Intravenous, Q6H PRN    ondansetron, 4 mg, Intravenous, Q8H PRN    senna, 8.6 mg, Oral, Daily PRN    sodium chloride 0.9%, 10 mL, Intravenous,  PRN    Anthropometrics  Temp: 98.2 °F (36.8 °C)  Height Method: Estimated  Height: 6' (182.9 cm)  Height (inches): 72 in  Weight Method: Bed Scale  Weight: 72.3 kg (159 lb 6.3 oz)  Weight (lb): 159.39 lb  Ideal Body Weight (IBW), Male: 178 lb  % Ideal Body Weight, Male (lb): 92.65 %  BMI (Calculated): 21.6       Estimated/Assessed Needs  RMR ([place]-St. Jeor Equation): 1466     Temp: 98.2 °F (36.8 °C)Axillary  Weight Used For Calorie Calculations: 75.2 kg (165 lb 12.6 oz)     Energy Calorie Requirements (kcal): 1880-2256kcal (25-30kcal/kg)  Weight Used For Protein Calculations: 72.3 kg (159 lb 6.3 oz)  Protein Requirements: 43-58g (0.6-0.8g/kg)       RDA Method (mL): 1880       Nutrition by Nursing  Diet/Nutrition Received: mechanical/dental soft  Intake (%): 100%  Diet/Feeding Assistance: total feed  Diet/Feeding Tolerance: other (see comments)  Last Bowel Movement: 05/30/24                Nutrition Follow-Up  RD Follow-up?: Yes      Nutrition Discharge Planning: patient to return to care center on dc; continue diet per SLP jericho Lyon, MS, RD, LD  Available via Secure Chat

## 2024-06-03 NOTE — PROGRESS NOTES
[facility] Medicine  Progress Note    Patient Name: Claude Patrick  MRN: [id number]  Patient Class: IP- Inpatient   Admission Date: 5/27/2024  Length of Stay: 7 days  Attending Physician: Luis Husain MD  Primary Care Provider: Tita, Primary Doctor        Subjective:     Principal Problem:Acute respiratory failure with hypoxia        HPI:  Per ICU team: (5/27/24)    82 yo M with CVA w/ expressive aphasia and residual R sided weakness, HTN, prostate cancer s/p prostatectomy (2004),  FTT, seizure disorder who resides at [facility] (since 10/2023) presented 05/27 with DASH found to be in respiratory distress with admission to ICU for management of hypertensive emergency.      History was obtained from records as patient is a poor historian at baseline.  Patient presented from his nursing home with acute respiratory distress.  There was concern for an aspiration event.  On presentation to the ED he was noted to be hypoxic with SpO2 93%   Associated with increased work of breathing.  A chest x-ray demonstrated hazy opacification of bilateral lungs. He was recently admitted 04/30-05/03 with hypernatremia 2/2 hypovolemia which was attributed to his decreased PO intake.   ED course with patient receiving vancomycin IV x1, Zosyn IV x1 and NS IV x1L. On my assessment, recommended diuresis with Lasix IV, NIV settings were changed and started him on a nitro glycerin drip.    Overview/Hospital Course:  Admitted to ICU on 5/27 for acute respiratory failure requiring BIPAP sec to acute pulmonary edema from hypertensive emergency. Found to have elevated troponin from HTN emergency and possibly with acute diastolic heart failure requiring Lasix infusion. Echo showed severe MR. Cardiology followed. Course complicated with Afib RVR requiring adjustment of home beta blocker dose. Patient developed DENYS on CKD stage 3 likely sec to diuresis. Patient was eventually weaned off BIPAP to NC, Lasix  was held due to DENYS. He was transferred to floor on 5/30 and hospitalist assumed care on 5/31.     5/31- BP controlled, developed atrial fibrillation with RVR again requiring IV Lopressor 5mg x one dose. Na trending up.   6/1- Gentle hydration started, metoprolol dose increased for HR control.   6/2- Off hydration as clinically appeared slightly volume overloaded, one dose of IV Lasix given. CXR and oxygenation stable.   6/3- Worsening lung exam, concerning for aspiration. SLP consulted. BUN rising, Cr stable.     Interval History: Patient seen and examined at the bedside, not much verbal, answers yes and no, follow some commands. Appears to have coarse breath sounds now.     Review of Systems   Unable to perform ROS: Dementia     Objective:     Vital Signs (Most Recent):  Temp: 99 °F (37.2 °C) (06/03/24 0832)  Pulse: 78 (06/03/24 0900)  Resp: 18 (06/03/24 0832)  BP: 132/86 (06/03/24 0900)  SpO2: 96 % (06/03/24 0900) Vital Signs (24h Range):  Temp:  [97.5 °F (36.4 °C)-99 °F (37.2 °C)] 99 °F (37.2 °C)  Pulse:  [63-85] 78  Resp:  [16-20] 18  SpO2:  [90 %-100 %] 96 %  BP: (109-143)/() 132/86     Weight: 72.3 kg (159 lb 6.3 oz)  Body mass index is 21.62 kg/m².    Intake/Output Summary (Last 24 hours) at 6/3/2024 1053  Last data filed at 6/2/2024 1756  Gross per 24 hour   Intake 522 ml   Output --   Net 522 ml         Physical Exam  Constitutional:       Appearance: He is ill-appearing.   Cardiovascular:      Rate and Rhythm: Tachycardia present. Rhythm irregular.      Heart sounds: Murmur heard.   Pulmonary:      Effort: Pulmonary effort is normal.      Breath sounds: Rhonchi and rales present.   Abdominal:      General: Bowel sounds are normal.      Palpations: Abdomen is soft.   Skin:     General: Skin is warm.   Neurological:      General: No focal deficit present.      Mental Status: He is easily aroused. Mental status is at baseline. He is disoriented.             Significant Labs: All pertinent labs within  the past 24 hours have been reviewed.    Significant Imaging: I have reviewed all pertinent imaging results/findings within the past 24 hours.    Assessment/Plan:      * Acute respiratory failure with hypoxia  Sec to flash pulmonary edema from HTN emergency.  Required BIPAP on admission and ICU stay, transferred to floor after weaning to NC 2L.  S/p intermittent Lasix dosing.  Slightly coarse breath sounds noted on exam on 6/3, concern for aspiration.  SLP consulted.   Monitor oxygenation.     Hypertensive emergency  Patient has a current diagnosis of Hypertensive emergency with end organ damage evidenced by acute kidney injury and acute heart failure which is controlled.  Latest blood pressure and vitals reviewed-   Temp:  [97.5 °F (36.4 °C)-99 °F (37.2 °C)]   Pulse:  [63-85]   Resp:  [16-20]   BP: (109-143)/()   SpO2:  [90 %-100 %] .   Patient currently off IV antihypertensives.   Home meds for hypertension were reviewed.  Medication adjustment for hospital antihypertensives is as follows- resume current medications (started during this hospitalization): hydralazine, Imdur and carvedilol.   Monitor BP.         Atrial fibrillation with rapid ventricular response  Patient with Persistent (7 days or more) atrial fibrillation which is uncontrolled currently with Beta Blocker. Patient is currently in atrial fibrillation.[id number] Score: 2. HASBLED Score: 2. Anticoagulation indicated. Anticoagulation done with Eliquis .    Required IV Lopressor 5mg x 2 doses on 5/31 for better HR control.  Changed home carvedilol to metoprolol and dose increased for better HR control.    HR improved on 6/2.  Cardiology signed off.     DENYS (acute kidney injury)  Patient with acute kidney injury/acute renal failure likely due to pre-renal azotemia due to IVVD   DENYS is currently stable.   Baseline creatinine  ~ 2  - Labs reviewed- Renal function/electrolytes with Estimated Creatinine Clearance: 23.6 mL/min (A) (based on SCr of 2.51  mg/dL (H)). according to latest data.   Received some hydration and now stopped for concerns of volume overload.  Monitor with Lasix dosing.   Given rise in BUN, will hold Lasix on 6/3, may require gentle IV hydration if fails swallow.   Monitor urine output and serial BMP and adjust therapy as needed. Avoid nephrotoxins and renally dose meds for GFR listed above.    Acute on chronic diastolic heart failure  Echo    Result Date: 5/28/2024    Left Ventricle: The left ventricle is normal in size. Moderately   increased wall thickness. Regional wall motion abnormalities present.   Inferior wall hypokinesis There is low normal systolic function with a   visually estimated ejection fraction of 50 - 55%. Ejection fraction by   visual approximation is 50%.    Right Ventricle: Mild right ventricular enlargement.    Aortic Valve: The aortic valve is a trileaflet valve. There is mild   aortic valve sclerosis. Mildly calcified cusps.    Mitral Valve: There is mild bileaflet sclerosis. Mildly thickened   leaflets. There is severe regurgitation with a centrally directed jet.    IVC/SVC: Normal venous pressure at 3 mmHg.    Pericardium: There is a trivial effusion.           Cardiology followed.  Diuresed initially but held due to DENYS.  Now with intermittent Lasix dosing.   Continue carvedilol       Type 2 myocardial infarction  Troponin elevated sec to hypertensive emergency.  Cardiology followed, no ischemic workup recommended.         Severe mitral regurgitation  Per echocardiogram- Mitral Valve: There is mild bileaflet sclerosis. Mildly thickened leaflets. There is severe regurgitation with a centrally directed jet.  Cardiology followed, recommend medical management at this time, afterload reduction initiated (Bidil, no ace/arb due to CKD). Continue diuresis as able. Can consider referral to structural cardiology if unable to manage medically and family wishes.         Oropharyngeal dysphagia  On dysphagia pureed and nectar  thick liquids.  SLP consulted as exam concerning for aspiration.      Hypernatremia  Patient has hypernatremia which is controlled. The hypernatremia is due to Dehydration. We will aim to correct the sodium by 8-10mEq in 24 hours. S/p D5W, now off for concerns of volume overload.   The patient's sodium results have been reviewed and are listed below.  Recent Labs   Lab 06/03/24  0520   *         Seizure disorder  Resume home Keppra.       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 Dementia is severe. The patient does not have signs of behavioral disturbance. Home dementia medications are Held or Continued: continued.. Continue non-pharmacologic interventions to prevent delirium (No VS between 11PM-5AM, activity during day, opening blinds, providing glasses/hearing aids, and up in chair during daytime). Will avoid narcotics and benzos unless absolutely necessary. PRN anti-psychotics are not prescribed to avoid self harm behaviors.  History of CVA-  resume Eliquis and Plavix (off aspirin to avoid triple therapy), statin.       VTE Risk Mitigation (From admission, onward)           Ordered     apixaban tablet 2.5 mg  2 times daily         05/29/24 1845     IP VTE HIGH RISK PATIENT  Once         05/27/24 1436     Place sequential compression device  Until discontinued         05/27/24 1436                    Discharge Planning   SHAYY: 6/5/2024     Code Status: DNR   Is the patient medically ready for discharge?:     Reason for patient still in hospital (select all that apply): Patient trending condition and Treatment  Discharge Plan A: Return to nursing home              FREDO COLLINS MD  Department of Hospital Medicine   [facility]

## 2024-06-03 NOTE — SUBJECTIVE & OBJECTIVE
Interval History: Patient seen and examined at the bedside, not much verbal, answers yes and no, follow some commands. Appears to have coarse breath sounds now.     Review of Systems   Unable to perform ROS: Dementia     Objective:     Vital Signs (Most Recent):  Temp: 99 °F (37.2 °C) (06/03/24 0832)  Pulse: 78 (06/03/24 0900)  Resp: 18 (06/03/24 0832)  BP: 132/86 (06/03/24 0900)  SpO2: 96 % (06/03/24 0900) Vital Signs (24h Range):  Temp:  [97.5 °F (36.4 °C)-99 °F (37.2 °C)] 99 °F (37.2 °C)  Pulse:  [63-85] 78  Resp:  [16-20] 18  SpO2:  [90 %-100 %] 96 %  BP: (109-143)/() 132/86     Weight: 72.3 kg (159 lb 6.3 oz)  Body mass index is 21.62 kg/m².    Intake/Output Summary (Last 24 hours) at 6/3/2024 1053  Last data filed at 6/2/2024 1756  Gross per 24 hour   Intake 522 ml   Output --   Net 522 ml         Physical Exam  Constitutional:       Appearance: He is ill-appearing.   Cardiovascular:      Rate and Rhythm: Tachycardia present. Rhythm irregular.      Heart sounds: Murmur heard.   Pulmonary:      Effort: Pulmonary effort is normal.      Breath sounds: Rhonchi and rales present.   Abdominal:      General: Bowel sounds are normal.      Palpations: Abdomen is soft.   Skin:     General: Skin is warm.   Neurological:      General: No focal deficit present.      Mental Status: He is easily aroused. Mental status is at baseline. He is disoriented.             Significant Labs: All pertinent labs within the past 24 hours have been reviewed.    Significant Imaging: I have reviewed all pertinent imaging results/findings within the past 24 hours.

## 2024-06-03 NOTE — ASSESSMENT & PLAN NOTE
Patient with Persistent (7 days or more) atrial fibrillation which is uncontrolled currently with Beta Blocker. Patient is currently in atrial fibrillation.[id number] Score: 2. HASBLED Score: 2. Anticoagulation indicated. Anticoagulation done with Eliquis .    Required IV Lopressor 5mg x 2 doses on 5/31 for better HR control.  Changed home carvedilol to metoprolol and dose increased for better HR control.    HR improved on 6/2.  Cardiology signed off.

## 2024-06-03 NOTE — ASSESSMENT & PLAN NOTE
Sec to flash pulmonary edema from HTN emergency.  Required BIPAP on admission and ICU stay, transferred to floor after weaning to NC 2L.  S/p intermittent Lasix dosing.  Slightly coarse breath sounds noted on exam on 6/3, concern for aspiration.  SLP consulted.   Monitor oxygenation.

## 2024-06-03 NOTE — PLAN OF CARE
Problem: Adult Inpatient Plan of Care  Goal: Plan of Care Review  Outcome: Not Progressing  Goal: Patient-Specific Goal (Individualized)  Outcome: Not Progressing  Goal: Absence of Hospital-Acquired Illness or Injury  Outcome: Not Progressing  Goal: Optimal Comfort and Wellbeing  Outcome: Not Progressing  Goal: Readiness for Transition of Care  Outcome: Not Progressing     Problem: Acute Kidney Injury/Impairment  Goal: Fluid and Electrolyte Balance  Outcome: Not Progressing  Goal: Improved Oral Intake  Outcome: Not Progressing  Goal: Effective Renal Function  Outcome: Not Progressing     Problem: Wound  Goal: Optimal Coping  Outcome: Not Progressing  Goal: Optimal Functional Ability  Outcome: Not Progressing  Goal: Absence of Infection Signs and Symptoms  Outcome: Not Progressing  Goal: Improved Oral Intake  Outcome: Not Progressing  Goal: Optimal Pain Control and Function  Outcome: Not Progressing  Goal: Skin Health and Integrity  Outcome: Not Progressing  Goal: Optimal Wound Healing  Outcome: Not Progressing     Problem: Fall Injury Risk  Goal: Absence of Fall and Fall-Related Injury  Outcome: Not Progressing     Problem: Skin Injury Risk Increased  Goal: Skin Health and Integrity  Outcome: Not Progressing     Problem: Infection  Goal: Absence of Infection Signs and Symptoms  Outcome: Not Progressing     Problem: Gas Exchange Impaired  Goal: Optimal Gas Exchange  Outcome: Not Progressing

## 2024-06-03 NOTE — PLAN OF CARE
06/03/24 @ 1250 - Per morning huddle - pt is dysphagic and requires a speech consult. Planned d/c Wednesday 06/05/24; if medically appropriate. CM will continue to follow for anticipated d/c needs.

## 2024-06-03 NOTE — ASSESSMENT & PLAN NOTE
Echo    Result Date: 5/28/2024    Left Ventricle: The left ventricle is normal in size. Moderately   increased wall thickness. Regional wall motion abnormalities present.   Inferior wall hypokinesis There is low normal systolic function with a   visually estimated ejection fraction of 50 - 55%. Ejection fraction by   visual approximation is 50%.    Right Ventricle: Mild right ventricular enlargement.    Aortic Valve: The aortic valve is a trileaflet valve. There is mild   aortic valve sclerosis. Mildly calcified cusps.    Mitral Valve: There is mild bileaflet sclerosis. Mildly thickened   leaflets. There is severe regurgitation with a centrally directed jet.    IVC/SVC: Normal venous pressure at 3 mmHg.    Pericardium: There is a trivial effusion.           Cardiology followed.  Diuresed initially but held due to DENYS.  Now with intermittent Lasix dosing.   Continue carvedilol

## 2024-06-03 NOTE — PT/OT/SLP EVAL
Speech Language Pathology Evaluation  Bedside Swallow    Patient Name:  Claude Patrick   MRN:  [id number]  Admitting Diagnosis: Acute respiratory failure with hypoxia    Recommendations:                 General Recommendations:   Reevaluate as needed  Diet recommendations:  NPO, Other (Comment) (Rec alternate route of feeding at this time.), NPO   Aspiration Precautions: Alternate means of nutrition/hydration   General Precautions: Standard,    Communication strategies:   Patient does not verbalize much at all but did follow some simple commands. Patient lethargic and congested sounding.     Assessment:     Claude Patrick is a 83 y.o. male with an SLP diagnosis of Dysphagia.  He presents with being lethargic and having a significant swallow delay which puts him at risk for aspiration.    History:     Past Medical History:   Diagnosis Date    Depression     Hemiplegia and hemiparesis following cerebral infarction affecting right dominant side     Hypertension     Seizures     Stroke     Vascular dementia        No past surgical history on file.    Social History: Patient lives in a nursing home.    Prior Intubation HX:  see chart    Modified Barium Swallow: n/a    Chest X-Rays: see chart    Prior diet: pureed consistency with nectar thickened liquids.    Occupation/hobbies/homemaking: not stated.    Subjective     Patient lying in bed asleep. Patient hard to awaken and kept his eyes closed most of the time.   Patient goals: not stated     Pain/Comfort:  Pain Rating 1: 0/10 (Nothing reported)    Respiratory Status: Nasal cannula, flow 2 L/min    Objective:     Oral Musculature Evaluation  Oral Musculature: WFL  Dentition: scattered dentition  Secretion Management: adequate  Mucosal Quality: adequate  Oral Labial Strength and Mobility: WFL  Lingual Strength and Mobility: WFL    Bedside Swallow Eval:   Consistencies Assessed:  [place] SLP gave patient tiny bites of apple sauce via a spoon. Patient needed constant cues to  open his eyes and stay awake. Although no overt s/s of aspiration noted, patient had a significant swallow delay which puts him at a high risk for aspiration. No other consistencies tested secondary to patient too lethargic. I do not feel that patient is safe with po intake at this time.       Oral Phase:   Slow oral transit time    Pharyngeal Phase:   delayed swallow initation    Compensatory Strategies  None    Treatment: Rec NPO with an alternate route of feeding at this time. Reevaluate when patient more alert and can be safer with po intake.     Goals:   Multidisciplinary Problems       SLP Goals       Not on file                    Plan:     Patient to be seen:      Plan of Care expires:     Plan of Care reviewed with:      SLP Follow-Up:  No (Reevaluate as needed)       Discharge recommendations:      Barriers to Discharge:  Level of Skilled Assistance Needed nursing home    Time Tracking:     SLP Treatment Date:      Speech Start Time:  1137  Speech Stop Time:  1202     Speech Total Time (min):  25 min    Billable Minutes: Eval Swallow and Oral Function 25 06/03/2024

## 2024-06-03 NOTE — PHYSICIAN QUERY
"Please clarify the nutritional diagnosis associated with the clinical findings (include all that apply):    Provider, please hit the "Enter" button after selecting your response.    Moderate Protein Calorie Malnutrition    "

## 2024-06-03 NOTE — ASSESSMENT & PLAN NOTE
Patient with acute kidney injury/acute renal failure likely due to pre-renal azotemia due to IVVD   DENYS is currently stable.   Baseline creatinine  ~ 2  - Labs reviewed- Renal function/electrolytes with Estimated Creatinine Clearance: 23.6 mL/min (A) (based on SCr of 2.51 mg/dL (H)). according to latest data.   Received some hydration and now stopped for concerns of volume overload.  Monitor with Lasix dosing.   Given rise in BUN, will hold Lasix on 6/3, may require gentle IV hydration if fails swallow.   Monitor urine output and serial BMP and adjust therapy as needed. Avoid nephrotoxins and renally dose meds for GFR listed above.

## 2024-06-04 LAB
ANION GAP SERPL CALCULATED.3IONS-SCNC: 10 MMOL/L (ref 7–16)
ANISOCYTOSIS BLD QL SMEAR: ABNORMAL
BASOPHILS # BLD AUTO: 0 K/UL (ref 0–0.2)
BASOPHILS NFR BLD AUTO: 0 % (ref 0–1)
BUN SERPL-MCNC: 92 MG/DL (ref 7–18)
BUN/CREAT SERPL: 42 (ref 6–20)
CALCIUM SERPL-MCNC: 8.7 MG/DL (ref 8.5–10.1)
CHLORIDE SERPL-SCNC: 127 MMOL/L (ref 98–107)
CO2 SERPL-SCNC: 19 MMOL/L (ref 21–32)
CREAT SERPL-MCNC: 2.21 MG/DL (ref 0.7–1.3)
DIFFERENTIAL METHOD BLD: ABNORMAL
EGFR (NO RACE VARIABLE) (RUSH/TITUS): 29 ML/MIN/1.73M2
EOSINOPHIL # BLD AUTO: 0.04 K/UL (ref 0–0.5)
EOSINOPHIL NFR BLD AUTO: 0.4 % (ref 1–4)
ERYTHROCYTE [DISTWIDTH] IN BLOOD BY AUTOMATED COUNT: 18.5 % (ref 11.5–14.5)
GLUCOSE SERPL-MCNC: 105 MG/DL (ref 70–105)
GLUCOSE SERPL-MCNC: 109 MG/DL (ref 74–106)
GLUCOSE SERPL-MCNC: 111 MG/DL (ref 70–105)
GLUCOSE SERPL-MCNC: 90 MG/DL (ref 70–105)
GLUCOSE SERPL-MCNC: 99 MG/DL (ref 70–105)
GLUCOSE SERPL-MCNC: 99 MG/DL (ref 70–105)
HCT VFR BLD AUTO: 38.9 % (ref 40–54)
HGB BLD-MCNC: 11.8 G/DL (ref 13.5–18)
IMM GRANULOCYTES # BLD AUTO: 0.04 K/UL (ref 0–0.04)
IMM GRANULOCYTES NFR BLD: 0.4 % (ref 0–0.4)
LYMPHOCYTES # BLD AUTO: 0.82 K/UL (ref 1–4.8)
LYMPHOCYTES NFR BLD AUTO: 8.6 % (ref 27–41)
MCH RBC QN AUTO: 28 PG (ref 27–31)
MCHC RBC AUTO-ENTMCNC: 30.3 G/DL (ref 32–36)
MCV RBC AUTO: 92.4 FL (ref 80–96)
MONOCYTES # BLD AUTO: 0.43 K/UL (ref 0–0.8)
MONOCYTES NFR BLD AUTO: 4.5 % (ref 2–6)
MPC BLD CALC-MCNC: 13.3 FL (ref 9.4–12.4)
NEUTROPHILS # BLD AUTO: 8.21 K/UL (ref 1.8–7.7)
NEUTROPHILS NFR BLD AUTO: 86.1 % (ref 53–65)
NRBC # BLD AUTO: 0 X10E3/UL
NRBC, AUTO (.00): 0 %
PLATELET # BLD AUTO: 108 K/UL (ref 150–400)
PLATELET MORPHOLOGY: ABNORMAL
POTASSIUM SERPL-SCNC: 5.1 MMOL/L (ref 3.5–5.1)
RBC # BLD AUTO: 4.21 M/UL (ref 4.6–6.2)
SODIUM SERPL-SCNC: 151 MMOL/L (ref 136–145)
WBC # BLD AUTO: 9.54 K/UL (ref 4.5–11)

## 2024-06-04 PROCEDURE — 25000003 PHARM REV CODE 250: Performed by: STUDENT IN AN ORGANIZED HEALTH CARE EDUCATION/TRAINING PROGRAM

## 2024-06-04 PROCEDURE — 99900035 HC TECH TIME PER 15 MIN (STAT)

## 2024-06-04 PROCEDURE — 25000003 PHARM REV CODE 250: Performed by: INTERNAL MEDICINE

## 2024-06-04 PROCEDURE — 63600175 PHARM REV CODE 636 W HCPCS: Performed by: STUDENT IN AN ORGANIZED HEALTH CARE EDUCATION/TRAINING PROGRAM

## 2024-06-04 PROCEDURE — 82962 GLUCOSE BLOOD TEST: CPT

## 2024-06-04 PROCEDURE — 99233 SBSQ HOSP IP/OBS HIGH 50: CPT | Mod: ,,, | Performed by: STUDENT IN AN ORGANIZED HEALTH CARE EDUCATION/TRAINING PROGRAM

## 2024-06-04 PROCEDURE — 25000242 PHARM REV CODE 250 ALT 637 W/ HCPCS: Performed by: STUDENT IN AN ORGANIZED HEALTH CARE EDUCATION/TRAINING PROGRAM

## 2024-06-04 PROCEDURE — 85025 COMPLETE CBC W/AUTO DIFF WBC: CPT | Performed by: INTERNAL MEDICINE

## 2024-06-04 PROCEDURE — 94640 AIRWAY INHALATION TREATMENT: CPT

## 2024-06-04 PROCEDURE — 25000003 PHARM REV CODE 250: Performed by: NURSE PRACTITIONER

## 2024-06-04 PROCEDURE — 80048 BASIC METABOLIC PNL TOTAL CA: CPT | Performed by: INTERNAL MEDICINE

## 2024-06-04 PROCEDURE — 36415 COLL VENOUS BLD VENIPUNCTURE: CPT | Performed by: INTERNAL MEDICINE

## 2024-06-04 PROCEDURE — 94761 N-INVAS EAR/PLS OXIMETRY MLT: CPT

## 2024-06-04 PROCEDURE — 27000221 HC OXYGEN, UP TO 24 HOURS

## 2024-06-04 PROCEDURE — 11000001 HC ACUTE MED/SURG PRIVATE ROOM

## 2024-06-04 RX ORDER — SODIUM CHLORIDE, SODIUM LACTATE, POTASSIUM CHLORIDE, CALCIUM CHLORIDE 600; 310; 30; 20 MG/100ML; MG/100ML; MG/100ML; MG/100ML
INJECTION, SOLUTION INTRAVENOUS CONTINUOUS
Status: DISCONTINUED | OUTPATIENT
Start: 2024-06-04 | End: 2024-06-04

## 2024-06-04 RX ORDER — DEXTROSE MONOHYDRATE 50 MG/ML
INJECTION, SOLUTION INTRAVENOUS CONTINUOUS
Status: DISCONTINUED | OUTPATIENT
Start: 2024-06-04 | End: 2024-06-05 | Stop reason: HOSPADM

## 2024-06-04 RX ORDER — SODIUM CHLORIDE 9 MG/ML
INJECTION, SOLUTION INTRAVENOUS CONTINUOUS
Status: DISCONTINUED | OUTPATIENT
Start: 2024-06-04 | End: 2024-06-04

## 2024-06-04 RX ADMIN — DEXTROSE MONOHYDRATE: 50 INJECTION, SOLUTION INTRAVENOUS at 05:06

## 2024-06-04 RX ADMIN — METOPROLOL TARTRATE 50 MG: 50 TABLET, FILM COATED ORAL at 01:06

## 2024-06-04 RX ADMIN — PANTOPRAZOLE SODIUM 40 MG: 40 GRANULE, DELAYED RELEASE ORAL at 10:06

## 2024-06-04 RX ADMIN — METOPROLOL TARTRATE 50 MG: 50 TABLET, FILM COATED ORAL at 05:06

## 2024-06-04 RX ADMIN — IPRATROPIUM BROMIDE AND ALBUTEROL SULFATE 3 ML: 2.5; .5 SOLUTION RESPIRATORY (INHALATION) at 12:06

## 2024-06-04 RX ADMIN — METOPROLOL TARTRATE 50 MG: 50 TABLET, FILM COATED ORAL at 12:06

## 2024-06-04 RX ADMIN — LEVETIRACETAM 500 MG: 500 SOLUTION ORAL at 10:06

## 2024-06-04 RX ADMIN — SODIUM CHLORIDE, POTASSIUM CHLORIDE, SODIUM LACTATE AND CALCIUM CHLORIDE: 600; 310; 30; 20 INJECTION, SOLUTION INTRAVENOUS at 10:06

## 2024-06-04 RX ADMIN — APIXABAN 2.5 MG: 2.5 TABLET, FILM COATED ORAL at 10:06

## 2024-06-04 RX ADMIN — CLOPIDOGREL BISULFATE 75 MG: 75 TABLET ORAL at 10:06

## 2024-06-04 RX ADMIN — IPRATROPIUM BROMIDE AND ALBUTEROL SULFATE 3 ML: 2.5; .5 SOLUTION RESPIRATORY (INHALATION) at 07:06

## 2024-06-04 NOTE — ASSESSMENT & PLAN NOTE
Patient has hypernatremia which is controlled. The hypernatremia is due to Dehydration. We will aim to correct the sodium by 8-10mEq in 24 hours. S/p D5W, now off for concerns of volume overload.   The patient's sodium results have been reviewed and are listed below.  Recent Labs   Lab 06/04/24  0711   *      IV fluids overnight.   Replace free water

## 2024-06-04 NOTE — ASSESSMENT & PLAN NOTE
Sec to flash pulmonary edema from HTN emergency.  Required BIPAP on admission and ICU stay, transferred to floor after weaning to NC 2L.  S/p intermittent Lasix dosing.  Slightly coarse breath sounds noted on exam on 6/3, concern for aspiration.  SLP consulted.   Monitor oxygenation.   Secondary to  hypertensive emergency and flash pulmonary edema.  Now complicated by aspiration pneumonia  Family wishes to transitioned to hospice care

## 2024-06-04 NOTE — ASSESSMENT & PLAN NOTE
Patient with acute kidney injury/acute renal failure likely due to pre-renal azotemia due to IVVD   DENYS is currently stable.   Baseline creatinine  ~ 2  - Labs reviewed- Renal function/electrolytes with Estimated Creatinine Clearance: 26.8 mL/min (A) (based on SCr of 2.21 mg/dL (H)). according to latest data.   Received some hydration and now stopped for concerns of volume overload.  Monitor with Lasix dosing.   Given rise in BUN, will hold Lasix on 6/3, may require gentle IV hydration if fails swallow.   Monitor urine output and serial BMP and adjust therapy as needed. Avoid nephrotoxins and renally dose meds for GFR listed above.    6/4 IV fluids for now

## 2024-06-04 NOTE — PLAN OF CARE
06/04/24 @ 4553 - ABI spoke to pt's wife re: hospice choice. Wife chose [facility]. CM contacted [facility] and notified Milly of pt's choice and that CM would be sending pt info. Info sent. ABI will continue to follow.     06/04/234 @ 1022 - As discussed in morning discussion between ABI and MD, ABI contacted [facility] re: hospice services for the pt @ the NH. They allow hospice services through either [facility] or [facility]. ABI contacted Margareth Ambriz (pt's wife - [phone]). She would like to discuss this further with [facility]

## 2024-06-04 NOTE — ASSESSMENT & PLAN NOTE
Patient with dementia with likely etiology of vascular dementia. Dementia is severe. The patient does not have signs of behavioral disturbance. Home dementia medications are Held or Continued: continued.. Continue non-pharmacologic interventions to prevent delirium (No VS between 11PM-5AM, activity during day, opening blinds, providing glasses/hearing aids, and up in chair during daytime). Will avoid narcotics and benzos unless absolutely necessary. PRN anti-psychotics are not prescribed to avoid self harm behaviors.  History of CVA-  resume Eliquis and Plavix (off aspirin to avoid triple therapy), statin.   Family wishes to transition to hospice care.  Wife states he would be against her 's wishes to have artificial nutrition given.  DC NG tube

## 2024-06-04 NOTE — ASSESSMENT & PLAN NOTE
Patient with Persistent (7 days or more) atrial fibrillation which is uncontrolled currently with Beta Blocker. Patient is currently in atrial fibrillation.[id number] Score: 2. HASBLED Score: 2. Anticoagulation indicated. Anticoagulation done with Eliquis .    Required IV Lopressor 5mg x 2 doses on 5/31 for better HR control.  Changed home carvedilol to metoprolol and dose increased for better HR control.    HR improved on 6/2.  Cardiology signed off.

## 2024-06-04 NOTE — ASSESSMENT & PLAN NOTE
On dysphagia pureed and nectar thick liquids.  SLP consulted as exam concerning for aspiration.   Recommended NPO.  Family states it would be against patient wishes to give artificial nutrition.  Hospice consulted

## 2024-06-04 NOTE — PROGRESS NOTES
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      History was obtained from records as patient is a poor historian at baseline.  Patient presented from his nursing home with acute respiratory distress.  There was concern for an aspiration event.  On presentation to the ED he was noted to be hypoxic with SpO2 93%   Associated with increased work of breathing.  A chest x-ray demonstrated hazy opacification of bilateral lungs. He was recently admitted 04/30-05/03 with hypernatremia 2/2 hypovolemia which was attributed to his decreased PO intake.   ED course with patient receiving vancomycin IV x1, Zosyn IV x1 and NS IV x1L. On my assessment, recommended diuresis with Lasix IV, NIV settings were changed and started him on a nitro glycerin drip.    Overview/Hospital Course:  Admitted to ICU on 5/27 for acute respiratory failure requiring BIPAP sec to acute pulmonary edema from hypertensive emergency. Found to have elevated troponin from HTN emergency and possibly with acute diastolic heart failure requiring Lasix infusion. Echo showed severe MR. Cardiology followed. Course complicated with Afib RVR requiring adjustment of home beta blocker dose. Patient developed DENYS on CKD stage 3 likely sec to diuresis. Patient was eventually weaned off BIPAP to NC, Lasix  was held due to DENYS. He was transferred to floor on 5/30 and hospitalist assumed care on 5/31.     5/31- BP controlled, developed atrial fibrillation with RVR again requiring IV Lopressor 5mg x one dose. Na trending up.   6/1- Gentle hydration started, metoprolol dose increased for HR control.   6/2- Off hydration as clinically appeared slightly volume overloaded, one dose of IV Lasix given. CXR and oxygenation stable.   6/3- Worsening lung exam, concerning for aspiration. SLP consulted. BUN rising, Cr stable.    6/4 long discussion with wife this morning.  She states it would not be in line with her 's wishes to have artificial nutrition. family wishing  To transitioned to hospice care.    Interval History:   No acute events overnight    Review of Systems   Unable to perform ROS: Dementia     Objective:     Vital Signs (Most Recent):  Temp: 97.9 °F (36.6 °C) (06/04/24 1545)  Pulse: (!) 116 (06/04/24 1545)  Resp: 18 (06/04/24 1545)  BP: 138/73 (06/04/24 1545)  SpO2: 100 % (06/04/24 1545) Vital Signs (24h Range):  Temp:  [97.4 °F (36.3 °C)-98.3 °F (36.8 °C)] 97.9 °F (36.6 °C)  Pulse:  [] 116  Resp:  [16-22] 18  SpO2:  [95 %-100 %] 100 %  BP: (103-138)/(55-90) 138/73     Weight: 72.3 kg (159 lb 6.3 oz)  Body mass index is 21.62 kg/m².    Intake/Output Summary (Last 24 hours) at 6/4/2024 1655  Last data filed at 6/3/2024 1940  Gross per 24 hour   Intake 30 ml   Output --   Net 30 ml         Physical Exam  Constitutional:       Appearance: He is ill-appearing.   Cardiovascular:      Rate and Rhythm: Tachycardia present. Rhythm irregular.      Heart sounds: Murmur heard.   Pulmonary:      Effort: Pulmonary effort is normal.      Breath sounds: Rhonchi and rales present.   Abdominal:      General: Bowel sounds are normal.      Palpations: Abdomen is soft.   Skin:     General: Skin is warm.   Neurological:      General: No focal deficit present.      Mental Status: He is easily aroused. Mental status is at  baseline. He is disoriented.             Significant Labs: All pertinent labs within the past 24 hours have been reviewed.    Significant Imaging: I have reviewed all pertinent imaging results/findings within the past 24 hours.    Assessment/Plan:      * Acute respiratory failure with hypoxia  Sec to flash pulmonary edema from HTN emergency.  Required BIPAP on admission and ICU stay, transferred to floor after weaning to NC 2L.  S/p intermittent Lasix dosing.  Slightly coarse breath sounds noted on exam on 6/3, concern for aspiration.  SLP consulted.   Monitor oxygenation.   Secondary to  hypertensive emergency and flash pulmonary edema.  Now complicated by aspiration pneumonia  Family wishes to transitioned to hospice care    Oropharyngeal dysphagia  On dysphagia pureed and nectar thick liquids.  SLP consulted as exam concerning for aspiration.   Recommended NPO.  Family states it would be against patient wishes to give artificial nutrition.  Hospice consulted    Hypernatremia  Patient has hypernatremia which is controlled. The hypernatremia is due to Dehydration. We will aim to correct the sodium by 8-10mEq in 24 hours. S/p D5W, now off for concerns of volume overload.   The patient's sodium results have been reviewed and are listed below.  Recent Labs   Lab 06/04/24  0711   *      IV fluids overnight.   Replace free water    Atrial fibrillation with rapid ventricular response  Patient with Persistent (7 days or more) atrial fibrillation which is uncontrolled currently with Beta Blocker. Patient is currently in atrial fibrillation.[id number] Score: 2. HASBLED Score: 2. Anticoagulation indicated. Anticoagulation done with Eliquis .    Required IV Lopressor 5mg x 2 doses on 5/31 for better HR control.  Changed home carvedilol to metoprolol and dose increased for better HR control.    HR improved on 6/2.  Cardiology signed off.     Type 2 myocardial infarction  Troponin elevated sec to hypertensive  emergency.  Cardiology followed, no ischemic workup recommended.         Severe mitral regurgitation  Per echocardiogram- Mitral Valve: There is mild bileaflet sclerosis. Mildly thickened leaflets. There is severe regurgitation with a centrally directed jet.  Cardiology followed, recommend medical management at this time, afterload reduction initiated (Bidil, no ace/arb due to CKD). Continue diuresis as able. Can consider referral to structural cardiology if unable to manage medically and family wishes.         DENYS (acute kidney injury)  Patient with acute kidney injury/acute renal failure likely due to pre-renal azotemia due to IVVD   DENYS is currently stable.   Baseline creatinine  ~ 2  - Labs reviewed- Renal function/electrolytes with Estimated Creatinine Clearance: 26.8 mL/min (A) (based on SCr of 2.21 mg/dL (H)). according to latest data.   Received some hydration and now stopped for concerns of volume overload.  Monitor with Lasix dosing.   Given rise in BUN, will hold Lasix on 6/3, may require gentle IV hydration if fails swallow.   Monitor urine output and serial BMP and adjust therapy as needed. Avoid nephrotoxins and renally dose meds for GFR listed above.    6/4 IV fluids for now    Acute on chronic diastolic heart failure  Echo    Result Date: 5/28/2024    Left Ventricle: The left ventricle is normal in size. Moderately   increased wall thickness. Regional wall motion abnormalities present.   Inferior wall hypokinesis There is low normal systolic function with a   visually estimated ejection fraction of 50 - 55%. Ejection fraction by   visual approximation is 50%.    Right Ventricle: Mild right ventricular enlargement.    Aortic Valve: The aortic valve is a trileaflet valve. There is mild   aortic valve sclerosis. Mildly calcified cusps.    Mitral Valve: There is mild bileaflet sclerosis. Mildly thickened   leaflets. There is severe regurgitation with a centrally directed jet.    IVC/SVC: Normal venous  pressure at 3 mmHg.    Pericardium: There is a trivial effusion.           Cardiology followed.  Diuresed initially but held due to DENYS.  Now with intermittent Lasix dosing.   Continue carvedilol       Hypertensive emergency  Patient has a current diagnosis of Hypertensive emergency with end organ damage evidenced by acute kidney injury and acute heart failure which is controlled.  Latest blood pressure and vitals reviewed-   Temp:  [97.4 °F (36.3 °C)-98.3 °F (36.8 °C)]   Pulse:  []   Resp:  [16-22]   BP: (103-138)/(55-90)   SpO2:  [95 %-100 %] .   Patient currently off IV antihypertensives.   Home meds for hypertension were reviewed.  Medication adjustment for hospital antihypertensives is as follows- resume current medications (started during this hospitalization): hydralazine, Imdur and carvedilol.   Monitor BP.   Stable        Seizure disorder  Resume home Keppra.       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 Dementia is severe. The patient does not have signs of behavioral disturbance. Home dementia medications are Held or Continued: continued.. Continue non-pharmacologic interventions to prevent delirium (No VS between 11PM-5AM, activity during day, opening blinds, providing glasses/hearing aids, and up in chair during daytime). Will avoid narcotics and benzos unless absolutely necessary. PRN anti-psychotics are not prescribed to avoid self harm behaviors.  History of CVA-  resume Eliquis and Plavix (off aspirin to avoid triple therapy), statin.   Family wishes to transition to hospice care.  Wife states he would be against her 's wishes to have artificial nutrition given.  DC NG tube      VTE Risk Mitigation (From admission, onward)           Ordered     apixaban tablet 2.5 mg  2 times daily         05/29/24 6665     IP VTE HIGH RISK PATIENT  Once          05/27/24 1436     Place sequential compression device  Until discontinued         05/27/24 1436                    Discharge Planning   SHAYY: 6/5/2024     Code Status: DNR   Is the patient medically ready for discharge?:     Reason for patient still in hospital (select all that apply): Treatment  Discharge Plan A: Return to nursing home           Long discussion with family.  Decision made to transitioned to hospice care.  Discontinue NG tube.        Tomas Martinez DO  Department of Hospital Medicine   [facility]

## 2024-06-04 NOTE — SUBJECTIVE & OBJECTIVE
Interval History:   No acute events overnight    Review of Systems   Unable to perform ROS: Dementia     Objective:     Vital Signs (Most Recent):  Temp: 97.9 °F (36.6 °C) (06/04/24 1545)  Pulse: (!) 116 (06/04/24 1545)  Resp: 18 (06/04/24 1545)  BP: 138/73 (06/04/24 1545)  SpO2: 100 % (06/04/24 1545) Vital Signs (24h Range):  Temp:  [97.4 °F (36.3 °C)-98.3 °F (36.8 °C)] 97.9 °F (36.6 °C)  Pulse:  [] 116  Resp:  [16-22] 18  SpO2:  [95 %-100 %] 100 %  BP: (103-138)/(55-90) 138/73     Weight: 72.3 kg (159 lb 6.3 oz)  Body mass index is 21.62 kg/m².    Intake/Output Summary (Last 24 hours) at 6/4/2024 1655  Last data filed at 6/3/2024 1940  Gross per 24 hour   Intake 30 ml   Output --   Net 30 ml         Physical Exam  Constitutional:       Appearance: He is ill-appearing.   Cardiovascular:      Rate and Rhythm: Tachycardia present. Rhythm irregular.      Heart sounds: Murmur heard.   Pulmonary:      Effort: Pulmonary effort is normal.      Breath sounds: Rhonchi and rales present.   Abdominal:      General: Bowel sounds are normal.      Palpations: Abdomen is soft.   Skin:     General: Skin is warm.   Neurological:      General: No focal deficit present.      Mental Status: He is easily aroused. Mental status is at baseline. He is disoriented.             Significant Labs: All pertinent labs within the past 24 hours have been reviewed.    Significant Imaging: I have reviewed all pertinent imaging results/findings within the past 24 hours.

## 2024-06-04 NOTE — ASSESSMENT & PLAN NOTE
Patient has a current diagnosis of Hypertensive emergency with end organ damage evidenced by acute kidney injury and acute heart failure which is controlled.  Latest blood pressure and vitals reviewed-   Temp:  [97.4 °F (36.3 °C)-98.3 °F (36.8 °C)]   Pulse:  []   Resp:  [16-22]   BP: (103-138)/(55-90)   SpO2:  [95 %-100 %] .   Patient currently off IV antihypertensives.   Home meds for hypertension were reviewed.  Medication adjustment for hospital antihypertensives is as follows- resume current medications (started during this hospitalization): hydralazine, Imdur and carvedilol.   Monitor BP.   Stable

## 2024-06-04 NOTE — CARE UPDATE
Long discussion with wife.  Family opting for hospice.  Wife says would not be in line with Mr. Ambriz's wishes to use artificial feeding tube.  Consulted case management for hospice.

## 2024-06-05 VITALS
SYSTOLIC BLOOD PRESSURE: 146 MMHG | RESPIRATION RATE: 18 BRPM | BODY MASS INDEX: 21.59 KG/M2 | HEART RATE: 113 BPM | HEIGHT: 72 IN | OXYGEN SATURATION: 98 % | WEIGHT: 159.38 LBS | TEMPERATURE: 98 F | DIASTOLIC BLOOD PRESSURE: 86 MMHG

## 2024-06-05 PROBLEM — Z51.5 COMFORT MEASURES ONLY STATUS: Status: ACTIVE | Noted: 2024-06-05

## 2024-06-05 LAB
GLUCOSE SERPL-MCNC: 104 MG/DL (ref 70–105)
GLUCOSE SERPL-MCNC: 105 MG/DL (ref 70–105)
GLUCOSE SERPL-MCNC: 109 MG/DL (ref 70–105)

## 2024-06-05 PROCEDURE — 25000003 PHARM REV CODE 250: Performed by: INTERNAL MEDICINE

## 2024-06-05 PROCEDURE — 99239 HOSP IP/OBS DSCHRG MGMT >30: CPT | Mod: ,,, | Performed by: STUDENT IN AN ORGANIZED HEALTH CARE EDUCATION/TRAINING PROGRAM

## 2024-06-05 PROCEDURE — 94761 N-INVAS EAR/PLS OXIMETRY MLT: CPT

## 2024-06-05 PROCEDURE — 94640 AIRWAY INHALATION TREATMENT: CPT

## 2024-06-05 PROCEDURE — 82962 GLUCOSE BLOOD TEST: CPT

## 2024-06-05 PROCEDURE — 99900035 HC TECH TIME PER 15 MIN (STAT)

## 2024-06-05 PROCEDURE — 27000221 HC OXYGEN, UP TO 24 HOURS

## 2024-06-05 PROCEDURE — 25000242 PHARM REV CODE 250 ALT 637 W/ HCPCS: Performed by: STUDENT IN AN ORGANIZED HEALTH CARE EDUCATION/TRAINING PROGRAM

## 2024-06-05 PROCEDURE — 25000003 PHARM REV CODE 250: Performed by: STUDENT IN AN ORGANIZED HEALTH CARE EDUCATION/TRAINING PROGRAM

## 2024-06-05 RX ADMIN — METOPROLOL TARTRATE 5 MG: 1 INJECTION, SOLUTION INTRAVENOUS at 03:06

## 2024-06-05 RX ADMIN — IPRATROPIUM BROMIDE AND ALBUTEROL SULFATE 3 ML: 2.5; .5 SOLUTION RESPIRATORY (INHALATION) at 07:06

## 2024-06-05 RX ADMIN — IPRATROPIUM BROMIDE AND ALBUTEROL SULFATE 3 ML: 2.5; .5 SOLUTION RESPIRATORY (INHALATION) at 12:06

## 2024-06-05 RX ADMIN — DEXTROSE MONOHYDRATE: 50 INJECTION, SOLUTION INTRAVENOUS at 10:06

## 2024-06-05 NOTE — ASSESSMENT & PLAN NOTE
Patient has a current diagnosis of Hypertensive emergency with end organ damage evidenced by acute kidney injury and acute heart failure which is controlled.  Latest blood pressure and vitals reviewed-   Temp:  [97.4 °F (36.3 °C)-98.8 °F (37.1 °C)]   Pulse:  []   Resp:  [14-20]   BP: (110-138)/(52-92)   SpO2:  [94 %-100 %] .   Patient currently off IV antihypertensives.   Home meds for hypertension were reviewed.  Medication adjustment for hospital antihypertensives is as follows- resume current medications (started during this hospitalization): hydralazine, Imdur and carvedilol.   Monitor BP.   Stable   Case management working hospice

## 2024-06-05 NOTE — ASSESSMENT & PLAN NOTE
Patient with Persistent (7 days or more) atrial fibrillation which is uncontrolled currently with Beta Blocker. Patient is currently in atrial fibrillation.[id number] Score: 2. HASBLED Score: 2. Anticoagulation indicated. Anticoagulation done with Eliquis .    Required IV Lopressor 5mg x 2 doses on 5/31 for better HR control.  Changed home carvedilol to metoprolol and dose increased for better HR control.    HR improved on 6/2.  Cardiology signed off.

## 2024-06-05 NOTE — ASSESSMENT & PLAN NOTE
Echo    Result Date: 5/28/2024    Left Ventricle: The left ventricle is normal in size. Moderately   increased wall thickness. Regional wall motion abnormalities present.   Inferior wall hypokinesis There is low normal systolic function with a   visually estimated ejection fraction of 50 - 55%. Ejection fraction by   visual approximation is 50%.    Right Ventricle: Mild right ventricular enlargement.    Aortic Valve: The aortic valve is a trileaflet valve. There is mild   aortic valve sclerosis. Mildly calcified cusps.    Mitral Valve: There is mild bileaflet sclerosis. Mildly thickened   leaflets. There is severe regurgitation with a centrally directed jet.    IVC/SVC: Normal venous pressure at 3 mmHg.    Pericardium: There is a trivial effusion.           Cardiology followed.  Diuresed initially but held due to DENYS.  Now with intermittent Lasix dosing.   Continue carvedilol    Case management working on hospice    Hospice will assume care at nursing [place]

## 2024-06-05 NOTE — ASSESSMENT & PLAN NOTE
Patient with Persistent (7 days or more) atrial fibrillation which is uncontrolled currently with Beta Blocker. Patient is currently in atrial fibrillation.[id number] Score: 2. HASBLED Score: 2. Anticoagulation indicated. Anticoagulation done with Eliquis .    Required IV Lopressor 5mg x 2 doses on 5/31 for better HR control.  Changed home carvedilol to metoprolol and dose increased for better HR control.    HR improved on 6/2.  Cardiology signed off.

## 2024-06-05 NOTE — RESPIRATORY THERAPY
Treatment given by RT student, Yeny Woodward.     06/05/24 0711   Patient Assessment/Suction   Level of Consciousness (AVPU) alert   Respiratory Effort Unlabored   Expansion/Accessory Muscles/Retractions no retractions;no use of accessory muscles   All Lung Fields Breath Sounds Anterior:;Lateral:;coarse;equal bilaterally   Rhythm/Pattern, Respiratory unlabored;pattern regular;depth regular;no shortness of breath reported   Cough Frequency no cough   Skin Integrity   $ Wound Care Tech Time 15 min   Area Observed Left;Right;Behind ear;Cheek;Nares   Skin Appearance without discoloration   Barrier used? Other (see comments)  (soft nasal cannula in use)   PRE-TX-O2   Device (Oxygen Therapy) nasal cannula   $ Is the patient on Low Flow Oxygen? Yes   Flow (L/min) (Oxygen Therapy) 3   Oxygen Concentration (%) 32   SpO2 98 %   Pulse Oximetry Type Intermittent   $ Pulse Oximetry - Multiple Charge Pulse Oximetry - Multiple   Pulse (!) 122   Resp 14   Positioning   Body Position position maintained   Head of Bed (HOB) Positioning HOB elevated   Aerosol Therapy   $ Aerosol Therapy Charges Aerosol Treatment   Daily Review of Necessity (SVN) completed   Respiratory Treatment Status (SVN) given   Treatment Route (SVN) oxygen;mask   Patient Position HOB elevated   Post Treatment Assessment (SVN) breath sounds unchanged   Signs of Intolerance (SVN) none   Breath Sounds Post-Respiratory Treatment   Throughout All Fields Post-Treatment All Fields   Throughout All Fields Post-Treatment Anterior:;no change;Lateral:   Post-treatment Heart Rate (beats/min) 122   Post-treatment Resp Rate (breaths/min) 14   Respiratory Evaluation   $ Care Plan Tech Time 15 min

## 2024-06-05 NOTE — ASSESSMENT & PLAN NOTE
"Patient has hypernatremia which is controlled. The hypernatremia is due to Dehydration. We will aim to correct the sodium by 8-10mEq in 24 hours. S/p D5W, now off for concerns of volume overload.   The patient's sodium results have been reviewed and are listed below.  No results for input(s): "NA" in the last 24 hours.   IV fluids overnight.   Replace free water  CMP pending  "

## 2024-06-05 NOTE — ASSESSMENT & PLAN NOTE
Echo    Result Date: 5/28/2024    Left Ventricle: The left ventricle is normal in size. Moderately   increased wall thickness. Regional wall motion abnormalities present.   Inferior wall hypokinesis There is low normal systolic function with a   visually estimated ejection fraction of 50 - 55%. Ejection fraction by   visual approximation is 50%.    Right Ventricle: Mild right ventricular enlargement.    Aortic Valve: The aortic valve is a trileaflet valve. There is mild   aortic valve sclerosis. Mildly calcified cusps.    Mitral Valve: There is mild bileaflet sclerosis. Mildly thickened   leaflets. There is severe regurgitation with a centrally directed jet.    IVC/SVC: Normal venous pressure at 3 mmHg.    Pericardium: There is a trivial effusion.           Cardiology followed.  Diuresed initially but held due to DENYS.  Now with intermittent Lasix dosing.   Continue carvedilol    Case management working on hospice

## 2024-06-05 NOTE — ASSESSMENT & PLAN NOTE
Patient has a current diagnosis of Hypertensive emergency with end organ damage evidenced by acute kidney injury and acute heart failure which is controlled.  Latest blood pressure and vitals reviewed-   Temp:  [97.4 °F (36.3 °C)-98.8 °F (37.1 °C)]   Pulse:  []   Resp:  [16-20]   BP: (110-138)/(52-92)   SpO2:  [96 %-100 %] .   Patient currently off IV antihypertensives.   Home meds for hypertension were reviewed.  Medication adjustment for hospital antihypertensives is as follows- resume current medications (started during this hospitalization): hydralazine, Imdur and carvedilol.   Monitor BP.   Stable   Case management working hospice

## 2024-06-05 NOTE — DISCHARGE SUMMARY
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      History was obtained from records as patient is a poor historian at baseline.  Patient presented from his nursing home with acute respiratory distress.  There was concern for an aspiration event.  On presentation to the ED he was noted to be hypoxic with SpO2 93%   Associated with increased work of breathing.  A chest x-ray demonstrated hazy opacification of bilateral lungs. He was recently admitted 04/30-05/03 with hypernatremia 2/2 hypovolemia which was attributed to his decreased PO intake.   ED course with patient receiving vancomycin IV x1, Zosyn IV x1 and NS IV x1L. On my assessment, recommended diuresis with Lasix IV, NIV settings were changed and started him on a nitro glycerin drip.    * No surgery found *      Hospital Course:   Admitted to ICU on 5/27 for acute respiratory failure requiring BIPAP sec to acute pulmonary edema from hypertensive emergency. Found to have elevated troponin from HTN emergency and possibly with acute diastolic heart failure requiring Lasix infusion. Echo showed severe MR. Cardiology followed. Course complicated with Afib RVR requiring adjustment of home beta blocker dose.  Patient developed DENYS on CKD stage 3 likely sec to diuresis. Patient was eventually weaned off BIPAP to NC, Lasix was held due to DENYS. He was transferred to floor on 5/30 and hospitalist assumed care on 5/31.     5/31- BP controlled, developed atrial fibrillation with RVR again requiring IV Lopressor 5mg x one dose. Na trending up.   6/1- Gentle hydration started, metoprolol dose increased for HR control.   6/2- Off hydration as clinically appeared slightly volume overloaded, one dose of IV Lasix given. CXR and oxygenation stable.   6/3- Worsening lung exam, concerning for aspiration. SLP consulted. BUN rising, Cr stable.    6/4 long discussion with wife this morning.  She states it would not be in line with her 's wishes to have artificial nutrition. family wishing  To transitioned to hospice care.  6/5   Case management working on hospice services at the nursing home    6/5 hospice able to assume care at nursing home.  He is stable for discharge     Goals of Care Treatment Preferences:  Code Status: DNR      Consults:   Consults (From admission, onward)          Status Ordering Provider     Inpatient consult to Social Work  Once        Provider:  (Not yet assigned)    Completed OLIVE MCCORMICK     Inpatient consult to Registered Dietitian/Nutritionist  Once        Provider:  (Not yet assigned)    Completed FREDO COLLINS     Inpatient consult to Cardiology  Once        Provider:  Mateusz Kauffman MD    Completed MBAE, JUNE            Neuro  Seizure disorder  Resume home [facility].       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 Dementia is severe. The patient does not have signs of behavioral disturbance. Home dementia medications are Held or Continued: continued.. Continue non-pharmacologic interventions to prevent delirium (No VS between 11PM-5AM, activity during day,  opening blinds, providing glasses/hearing aids, and up in chair during daytime). Will avoid narcotics and benzos unless absolutely necessary. PRN anti-psychotics are not prescribed to avoid self harm behaviors.  History of CVA-  resume Eliquis and Plavix (off aspirin to avoid triple therapy), statin.   Family wishes to transition to hospice care.  Wife states he would be against her 's wishes to have artificial nutrition given.  DC NG tube  Case management working on hospice    Pulmonary  * Acute respiratory failure with hypoxia  Sec to flash pulmonary edema from HTN emergency.  Required BIPAP on admission and ICU stay, transferred to floor after weaning to NC 2L.  S/p intermittent Lasix dosing.  Slightly coarse breath sounds noted on exam on 6/3, concern for aspiration.  SLP consulted.   Monitor oxygenation.   Secondary to  hypertensive emergency and flash pulmonary edema.  Now complicated by aspiration pneumonia  Family wishes to transitioned to hospice care      Cardiac/Vascular  Atrial fibrillation with rapid ventricular response  Patient with Persistent (7 days or more) atrial fibrillation which is uncontrolled currently with Beta Blocker. Patient is currently in atrial fibrillation.[id number] Score: 2. HASBLED Score: 2. Anticoagulation indicated. Anticoagulation done with Eliquis .    Required IV Lopressor 5mg x 2 doses on 5/31 for better HR control.  Changed home carvedilol to metoprolol and dose increased for better HR control.    HR improved on 6/2.  Cardiology signed off.     Type 2 myocardial infarction  Troponin elevated sec to hypertensive emergency.  Cardiology followed, no ischemic workup recommended.      6/5 case management assisting with hospice    Severe mitral regurgitation  Per echocardiogram- Mitral Valve: There is mild bileaflet sclerosis. Mildly thickened leaflets. There is severe regurgitation with a centrally directed jet.  Cardiology followed, recommend medical management at this  time, afterload reduction initiated (Bidil, no ace/arb due to CKD). Continue diuresis as able. Can consider referral to structural cardiology if unable to manage medically and family wishes.     Family opting for hospice care.  Case management assisting    Acute on chronic diastolic heart failure  Echo    Result Date: 5/28/2024    Left Ventricle: The left ventricle is normal in size. Moderately   increased wall thickness. Regional wall motion abnormalities present.   Inferior wall hypokinesis There is low normal systolic function with a   visually estimated ejection fraction of 50 - 55%. Ejection fraction by   visual approximation is 50%.    Right Ventricle: Mild right ventricular enlargement.    Aortic Valve: The aortic valve is a trileaflet valve. There is mild   aortic valve sclerosis. Mildly calcified cusps.    Mitral Valve: There is mild bileaflet sclerosis. Mildly thickened   leaflets. There is severe regurgitation with a centrally directed jet.    IVC/SVC: Normal venous pressure at 3 mmHg.    Pericardium: There is a trivial effusion.           Cardiology followed.  Diuresed initially but held due to DENYS.  Now with intermittent Lasix dosing.   Continue carvedilol    Case management working on hospice    Hospice will assume care at nursing home    Hypertensive emergency  Patient has a current diagnosis of Hypertensive emergency with end organ damage evidenced by acute kidney injury and acute heart failure which is controlled.  Latest blood pressure and vitals reviewed-   Temp:  [97.4 °F (36.3 °C)-98.8 °F (37.1 °C)]   Pulse:  []   Resp:  [14-20]   BP: (110-138)/(52-92)   SpO2:  [94 %-100 %] .   Patient currently off IV antihypertensives.   Home meds for hypertension were reviewed.  Medication adjustment for hospital antihypertensives is as follows- resume current medications (started during this hospitalization): hydralazine, Imdur and carvedilol.   Monitor BP.   Stable   Case management working  "hospice      Renal/  Hypernatremia  Patient has hypernatremia which is controlled. The hypernatremia is due to Dehydration. We will aim to correct the sodium by 8-10mEq in 24 hours. S/p D5W, now off for concerns of volume overload.   The patient's sodium results have been reviewed and are listed below.  No results for input(s): "NA" in the last 24 hours.   IV fluids overnight.   Replace free water  CMP pending    DENYS (acute kidney injury)  Patient with acute kidney injury/acute renal failure likely due to pre-renal azotemia due to IVVD   DENYS is currently stable.   Baseline creatinine  ~ 2  - Labs reviewed- Renal function/electrolytes with Estimated Creatinine Clearance: 26.8 mL/min (A) (based on SCr of 2.21 mg/dL (H)). according to latest data.   Received some hydration and now stopped for concerns of volume overload.  Monitor with Lasix dosing.   Given rise in BUN, will hold Lasix on 6/3, may require gentle IV hydration if fails swallow.   Monitor urine output and serial BMP and adjust therapy as needed. Avoid nephrotoxins and renally dose meds for GFR listed above.    6/4 IV fluids for now    6/5 Case management working on hospice    GI  Oropharyngeal dysphagia  On dysphagia pureed and nectar thick liquids.  SLP consulted as exam concerning for aspiration.   Recommended NPO.  Family states it would be against patient wishes to give artificial nutrition.  Hospice consulted  Case management working on hospice    Palliative Care  Comfort measures only status   Hospice will assume care at nursing home        Final Active Diagnoses:    Diagnosis Date Noted POA    PRINCIPAL PROBLEM:  Acute respiratory failure with hypoxia [J96.01] 05/27/2024 Yes    Comfort measures only status [Z51.5] 06/05/2024 Not Applicable    Oropharyngeal dysphagia [R13.12] 06/03/2024 Yes    Hypernatremia [E87.0] 05/31/2024 No    Atrial fibrillation with rapid ventricular response [I48.91] 05/29/2024 Yes    Severe mitral regurgitation [I34.0] " 05/28/2024 Yes    Type 2 myocardial infarction [I21.A1] 05/28/2024 Yes    Hypertensive emergency [I16.1] 05/27/2024 Yes    Acute on chronic diastolic heart failure [I50.33] 05/27/2024 Yes    DENYS (acute kidney injury) [N17.9] 05/27/2024 Yes    Seizure disorder [G40.909] 04/30/2024 Yes    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    Specialty: Cardiology  Why: Appointment scheduled on 6/13/2024 at 2:20pm  Contact information:  [address] [place] [address] [facility] [address]  [phone]                           Patient Instructions:   No discharge procedures on file.    Significant Diagnostic Studies: Labs: All labs within the past 24 hours have been reviewed    Pending Diagnostic Studies:       None           Medications:  Reconciled Home Medications:      Medication List        START taking these medications      apixaban 2.5 mg Tab  Commonly known as: ELIQUIS  Take 1 tablet (2.5 mg total) by mouth 2 (two) times daily.            CONTINUE taking these medications      acetaminophen 500 MG tablet  Commonly known as: TYLENOL  Take 1,000 mg by mouth every 6 (six) hours as needed for Pain or Temperature greater than.     albuterol-ipratropium 2.5 mg-0.5 mg/3 mL nebulizer solution  Commonly known as: DUO-NEB  Take 3 mLs by nebulization every 6 (six) hours as needed.     clopidogreL 75 mg tablet  Commonly known as: PLAVIX  Take 75 mg by mouth once daily.     latanoprost 0.005 % ophthalmic solution  Place 1 drop into both eyes every evening.     levETIRAcetam 500 MG Tab  Commonly known as: KEPPRA  Take  500 mg by mouth 2 (two) times daily.     megestroL 400 mg/10 mL (40 mg/mL) Susp  Commonly known as: MEGACE  Take 5 mLs by mouth once daily.     mirtazapine 15 MG tablet  Commonly known as: REMERON  Take 15 mg by mouth every evening.     pantoprazole 40 mg suspension  Commonly known as: PROTONIX  Take 40 mg by mouth once daily.            STOP taking these medications      aspirin 81 MG EC tablet  Commonly known as: ECOTRIN     pravastatin 80 MG tablet  Commonly known as: PRAVACHOL              Indwelling Lines/Drains at time of discharge:   Lines/Drains/Airways       Drain  Duration             Male External Urinary Catheter 06/05/24 0054 <1 day                    Time spent on the discharge of patient: >30 minutes         Tomas Martinez DO  Department of Hospital Medicine  [facility]

## 2024-06-05 NOTE — SUBJECTIVE & OBJECTIVE
Interval History:   No acute events overnight    Review of Systems   Unable to perform ROS: Dementia     Objective:     Vital Signs (Most Recent):  Temp: 98.8 °F (37.1 °C) (06/05/24 0006)  Pulse: (!) 113 (06/05/24 0315)  Resp: 18 (06/05/24 0011)  BP: (!) 110/52 (06/05/24 0315)  SpO2: 99 % (06/05/24 0315) Vital Signs (24h Range):  Temp:  [97.4 °F (36.3 °C)-98.8 °F (37.1 °C)] 98.8 °F (37.1 °C)  Pulse:  [] 113  Resp:  [16-20] 18  SpO2:  [96 %-100 %] 99 %  BP: (110-138)/(52-92) 110/52     Weight: 72.3 kg (159 lb 6.3 oz)  Body mass index is 21.62 kg/m².    Intake/Output Summary (Last 24 hours) at 6/5/2024 0804  Last data filed at 6/5/2024 0545  Gross per 24 hour   Intake 1400.19 ml   Output --   Net 1400.19 ml         Physical Exam  Constitutional:       Appearance: He is ill-appearing.   Cardiovascular:      Rate and Rhythm: Tachycardia present. Rhythm irregular.      Heart sounds: Murmur heard.   Pulmonary:      Effort: Pulmonary effort is normal.      Breath sounds: Rhonchi and rales present.   Abdominal:      General: Bowel sounds are normal.      Palpations: Abdomen is soft.   Skin:     General: Skin is warm.   Neurological:      General: No focal deficit present.      Mental Status: He is easily aroused. Mental status is at baseline. He is disoriented.             Significant Labs: All pertinent labs within the past 24 hours have been reviewed.    Significant Imaging: I have reviewed all pertinent imaging results/findings within the past 24 hours.

## 2024-06-05 NOTE — ASSESSMENT & PLAN NOTE
Troponin elevated sec to hypertensive emergency.  Cardiology followed, no ischemic workup recommended.      6/5 case management assisting with hospice

## 2024-06-05 NOTE — ASSESSMENT & PLAN NOTE
Patient with dementia with likely etiology of vascular dementia. Dementia is severe. The patient does not have signs of behavioral disturbance. Home dementia medications are Held or Continued: continued.. Continue non-pharmacologic interventions to prevent delirium (No VS between 11PM-5AM, activity during day, opening blinds, providing glasses/hearing aids, and up in chair during daytime). Will avoid narcotics and benzos unless absolutely necessary. PRN anti-psychotics are not prescribed to avoid self harm behaviors.  History of CVA-  resume Eliquis and Plavix (off aspirin to avoid triple therapy), statin.   Family wishes to transition to hospice care.  Wife states he would be against her 's wishes to have artificial nutrition given.  DC NG tube  Case management working on hospice

## 2024-06-05 NOTE — ASSESSMENT & PLAN NOTE
Patient with acute kidney injury/acute renal failure likely due to pre-renal azotemia due to IVVD   DENYS is currently stable.   Baseline creatinine  ~ 2  - Labs reviewed- Renal function/electrolytes with Estimated Creatinine Clearance: 26.8 mL/min (A) (based on SCr of 2.21 mg/dL (H)). according to latest data.   Received some hydration and now stopped for concerns of volume overload.  Monitor with Lasix dosing.   Given rise in BUN, will hold Lasix on 6/3, may require gentle IV hydration if fails swallow.   Monitor urine output and serial BMP and adjust therapy as needed. Avoid nephrotoxins and renally dose meds for GFR listed above.    6/4 IV fluids for now    6/5 Case management working on hospice

## 2024-06-05 NOTE — PLAN OF CARE
06/05/24 @ 1100 - D/C packet completed and delivered to floor. CM will continue to follow.     06/05/24 @ 1008 - Pt info failed to send to [facility] - CM resent this morning. Pt planned to d/c today. CM working on d/c packet; will continue to follow.

## 2024-06-05 NOTE — ASSESSMENT & PLAN NOTE
On dysphagia pureed and nectar thick liquids.  SLP consulted as exam concerning for aspiration.   Recommended NPO.  Family states it would be against patient wishes to give artificial nutrition.  Hospice consulted  Case management working on hospice

## 2024-06-05 NOTE — PLAN OF CARE
Problem: Adult Inpatient Plan of Care  Goal: Plan of Care Review  Outcome: Progressing  Goal: Patient-Specific Goal (Individualized)  Outcome: Progressing  Goal: Absence of Hospital-Acquired Illness or Injury  Outcome: Progressing  Goal: Optimal Comfort and Wellbeing  Outcome: Progressing  Goal: Readiness for Transition of Care  Outcome: Progressing     Problem: Acute Kidney Injury/Impairment  Goal: Fluid and Electrolyte Balance  Outcome: Progressing  Goal: Improved Oral Intake  Outcome: Progressing

## 2024-06-05 NOTE — PLAN OF CARE
[facility]  Discharge Final Note    Primary Care Provider: No, Primary Doctor    Expected Discharge Date: 6/5/2024    Final Discharge Note (most recent)       Final Note - 06/05/24 1605          Final Note    Anticipated Discharge Disposition Planned Readmission - Nursing Facility        Post-Acute Status    Post-Acute Authorization Placement;Hospice     Post-Acute Placement Status Set-up Complete/Auth obtained     Hospice Status Set-up Complete/Auth obtained     Discharge Delays None known at this time                     Important Message from Medicare  Important Message from Medicare regarding Discharge Appeal Rights: Signed/date by patient/caregiver     Date IMM was signed: 06/04/24  Time IMM was signed: 1540    Contact Info       Michelle Grande ACNP   Specialty: Cardiology    [address]  [facility]  [place] [id number]   Phone: [phone]       Next Steps: Follow up on 6/13/2024    Instructions: Appointment scheduled on 6/13/2024 at 2:20pm          Pt returned to [facility] in [place] to start hospice services through [facility]

## 2024-06-05 NOTE — PROGRESS NOTES
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      History was obtained from records as patient is a poor historian at baseline.  Patient presented from his nursing home with acute respiratory distress.  There was concern for an aspiration event.  On presentation to the ED he was noted to be hypoxic with SpO2 93%   Associated with increased work of breathing.  A chest x-ray demonstrated hazy opacification of bilateral lungs. He was recently admitted 04/30-05/03 with hypernatremia 2/2 hypovolemia which was attributed to his decreased PO intake.   ED course with patient receiving vancomycin IV x1, Zosyn IV x1 and NS IV x1L. On my assessment, recommended diuresis with Lasix IV, NIV settings were changed and started him on a nitro glycerin drip.    Overview/Hospital Course:  Admitted to ICU on 5/27 for acute respiratory failure requiring BIPAP sec to acute pulmonary edema from hypertensive emergency. Found to have elevated troponin from HTN emergency and possibly with acute diastolic heart failure requiring Lasix infusion. Echo showed severe MR. Cardiology followed. Course complicated with Afib RVR requiring adjustment of home beta blocker dose. Patient developed DENYS on CKD stage 3 likely sec to diuresis. Patient was eventually weaned off BIPAP to NC, Lasix  was held due to DENYS. He was transferred to floor on 5/30 and hospitalist assumed care on 5/31.     5/31- BP controlled, developed atrial fibrillation with RVR again requiring IV Lopressor 5mg x one dose. Na trending up.   6/1- Gentle hydration started, metoprolol dose increased for HR control.   6/2- Off hydration as clinically appeared slightly volume overloaded, one dose of IV Lasix given. CXR and oxygenation stable.   6/3- Worsening lung exam, concerning for aspiration. SLP consulted. BUN rising, Cr stable.    6/4 long discussion with wife this morning.  She states it would not be in line with her 's wishes to have artificial nutrition. family wishing  To transitioned to hospice care.  6/5   Case management working on hospice services at the nursing home    Interval History:   No acute events overnight    Review of Systems   Unable to perform ROS: Dementia     Objective:     Vital Signs (Most Recent):  Temp: 98.8 °F (37.1 °C) (06/05/24 0006)  Pulse: (!) 113 (06/05/24 0315)  Resp: 18 (06/05/24 0011)  BP: (!) 110/52 (06/05/24 0315)  SpO2: 99 % (06/05/24 0315) Vital Signs (24h Range):  Temp:  [97.4 °F (36.3 °C)-98.8 °F (37.1 °C)] 98.8 °F (37.1 °C)  Pulse:  [] 113  Resp:  [16-20] 18  SpO2:  [96 %-100 %] 99 %  BP: (110-138)/(52-92) 110/52     Weight: 72.3 kg (159 lb 6.3 oz)  Body mass index is 21.62 kg/m².    Intake/Output Summary (Last 24 hours) at 6/5/2024 0804  Last data filed at 6/5/2024 0545  Gross per 24 hour   Intake 1400.19 ml   Output --   Net 1400.19 ml         Physical Exam  Constitutional:       Appearance: He is ill-appearing.   Cardiovascular:      Rate and Rhythm: Tachycardia present. Rhythm irregular.      Heart sounds: Murmur heard.   Pulmonary:      Effort: Pulmonary effort is normal.      Breath sounds: Rhonchi and rales present.   Abdominal:      General: Bowel sounds are normal.      Palpations: Abdomen is soft.   Skin:     General: Skin is warm.   Neurological:      General: No focal  "deficit present.      Mental Status: He is easily aroused. Mental status is at baseline. He is disoriented.             Significant Labs: All pertinent labs within the past 24 hours have been reviewed.    Significant Imaging: I have reviewed all pertinent imaging results/findings within the past 24 hours.    Assessment/Plan:      * Acute respiratory failure with hypoxia  Sec to flash pulmonary edema from HTN emergency.  Required BIPAP on admission and ICU stay, transferred to floor after weaning to NC 2L.  S/p intermittent Lasix dosing.  Slightly coarse breath sounds noted on exam on 6/3, concern for aspiration.  SLP consulted.   Monitor oxygenation.   Secondary to  hypertensive emergency and flash pulmonary edema.  Now complicated by aspiration pneumonia  Family wishes to transitioned to hospice care      Oropharyngeal dysphagia  On dysphagia pureed and nectar thick liquids.  SLP consulted as exam concerning for aspiration.   Recommended NPO.  Family states it would be against patient wishes to give artificial nutrition.  Hospice consulted  Case management working on hospice    Hypernatremia  Patient has hypernatremia which is controlled. The hypernatremia is due to Dehydration. We will aim to correct the sodium by 8-10mEq in 24 hours. S/p D5W, now off for concerns of volume overload.   The patient's sodium results have been reviewed and are listed below.  No results for input(s): "NA" in the last 24 hours.   IV fluids overnight.   Replace free water  CMP pending    Atrial fibrillation with rapid ventricular response  Patient with Persistent (7 days or more) atrial fibrillation which is uncontrolled currently with Beta Blocker. Patient is currently in atrial fibrillation.[id number] Score: 2. HASBLED Score: 2. Anticoagulation indicated. Anticoagulation done with Eliquis .    Required IV Lopressor 5mg x 2 doses on 5/31 for better HR control.  Changed home carvedilol to metoprolol and dose increased for better HR " control.    HR improved on 6/2.  Cardiology signed off.     Type 2 myocardial infarction  Troponin elevated sec to hypertensive emergency.  Cardiology followed, no ischemic workup recommended.      6/5 case management assisting with hospice    Severe mitral regurgitation  Per echocardiogram- Mitral Valve: There is mild bileaflet sclerosis. Mildly thickened leaflets. There is severe regurgitation with a centrally directed jet.  Cardiology followed, recommend medical management at this time, afterload reduction initiated (Bidil, no ace/arb due to CKD). Continue diuresis as able. Can consider referral to structural cardiology if unable to manage medically and family wishes.     Family opting for hospice care.  Case management assisting    DENYS (acute kidney injury)  Patient with acute kidney injury/acute renal failure likely due to pre-renal azotemia due to IVVD   DENYS is currently stable.   Baseline creatinine  ~ 2  - Labs reviewed- Renal function/electrolytes with Estimated Creatinine Clearance: 26.8 mL/min (A) (based on SCr of 2.21 mg/dL (H)). according to latest data.   Received some hydration and now stopped for concerns of volume overload.  Monitor with Lasix dosing.   Given rise in BUN, will hold Lasix on 6/3, may require gentle IV hydration if fails swallow.   Monitor urine output and serial BMP and adjust therapy as needed. Avoid nephrotoxins and renally dose meds for GFR listed above.    6/4 IV fluids for now    6/5 Case management working on hospice    Acute on chronic diastolic heart failure  Echo    Result Date: 5/28/2024    Left Ventricle: The left ventricle is normal in size. Moderately   increased wall thickness. Regional wall motion abnormalities present.   Inferior wall hypokinesis There is low normal systolic function with a   visually estimated ejection fraction of 50 - 55%. Ejection fraction by   visual approximation is 50%.    Right Ventricle: Mild right ventricular enlargement.    Aortic Valve: The  aortic valve is a trileaflet valve. There is mild   aortic valve sclerosis. Mildly calcified cusps.    Mitral Valve: There is mild bileaflet sclerosis. Mildly thickened   leaflets. There is severe regurgitation with a centrally directed jet.    IVC/SVC: Normal venous pressure at 3 mmHg.    Pericardium: There is a trivial effusion.           Cardiology followed.  Diuresed initially but held due to DENYS.  Now with intermittent Lasix dosing.   Continue carvedilol    Case management working on hospice    Hypertensive emergency  Patient has a current diagnosis of Hypertensive emergency with end organ damage evidenced by acute kidney injury and acute heart failure which is controlled.  Latest blood pressure and vitals reviewed-   Temp:  [97.4 °F (36.3 °C)-98.8 °F (37.1 °C)]   Pulse:  []   Resp:  [16-20]   BP: (110-138)/(52-92)   SpO2:  [96 %-100 %] .   Patient currently off IV antihypertensives.   Home meds for hypertension were reviewed.  Medication adjustment for hospital antihypertensives is as follows- resume current medications (started during this hospitalization): hydralazine, Imdur and carvedilol.   Monitor BP.   Stable   Case management working hospice      Seizure disorder  Resume home Keppra.       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 Dementia is severe. The patient does not have signs of behavioral disturbance. Home dementia medications are Held or Continued: continued.. Continue non-pharmacologic interventions to prevent delirium (No VS between 11PM-5AM, activity during day, opening blinds, providing glasses/hearing aids, and up in chair during daytime). Will avoid narcotics and benzos unless absolutely necessary. PRN anti-psychotics are not prescribed to avoid self harm behaviors.  History of CVA-  resume Eliquis and Plavix (off aspirin to avoid triple therapy),  statin.   Family wishes to transition to hospice care.  Wife states he would be against her 's wishes to have artificial nutrition given.  DC NG tube  Case management working on hospice      VTE Risk Mitigation (From admission, onward)           Ordered     apixaban tablet 2.5 mg  2 times daily         05/29/24 1845     IP VTE HIGH RISK PATIENT  Once         05/27/24 1436     Place sequential compression device  Until discontinued         05/27/24 1436                    Discharge Planning   SHAYY: 6/5/2024     Code Status: DNR   Is the patient medically ready for discharge?:     Reason for patient still in hospital (select all that apply): Treatment  Discharge Plan A: Return to nursing home                  Tomas Martinez DO  Department of Hospital Medicine   [facility]     Patient remains in hospital for treatment.    Greater than 35 minutes spent on face to face patient interaction, discussion with family, ancillary staff, and/or other physicians as well as review of pertinent labs, images, and notes.

## 2024-06-05 NOTE — ASSESSMENT & PLAN NOTE
Per echocardiogram- Mitral Valve: There is mild bileaflet sclerosis. Mildly thickened leaflets. There is severe regurgitation with a centrally directed jet.  Cardiology followed, recommend medical management at this time, afterload reduction initiated (Bidil, no ace/arb due to CKD). Continue diuresis as able. Can consider referral to structural cardiology if unable to manage medically and family wishes.     Family opting for hospice care.  Case management assisting